# Patient Record
Sex: FEMALE | Race: WHITE | NOT HISPANIC OR LATINO | Employment: UNEMPLOYED | RURAL
[De-identification: names, ages, dates, MRNs, and addresses within clinical notes are randomized per-mention and may not be internally consistent; named-entity substitution may affect disease eponyms.]

---

## 2017-01-31 RX ORDER — HYDROCHLOROTHIAZIDE 25 MG/1
25 TABLET ORAL DAILY
Qty: 30 TABLET | Refills: 0 | Status: SHIPPED | OUTPATIENT
Start: 2017-01-31 | End: 2017-06-20 | Stop reason: SDUPTHER

## 2017-01-31 RX ORDER — CLONIDINE HYDROCHLORIDE 0.1 MG/1
0.1 TABLET ORAL NIGHTLY
Qty: 30 TABLET | Refills: 0 | Status: SHIPPED | OUTPATIENT
Start: 2017-01-31 | End: 2017-03-15 | Stop reason: SDUPTHER

## 2017-03-15 RX ORDER — CLONIDINE HYDROCHLORIDE 0.1 MG/1
0.1 TABLET ORAL NIGHTLY
Qty: 30 TABLET | Refills: 1 | Status: SHIPPED | OUTPATIENT
Start: 2017-03-15 | End: 2017-06-20 | Stop reason: SDUPTHER

## 2017-06-20 RX ORDER — CLONIDINE HYDROCHLORIDE 0.1 MG/1
0.1 TABLET ORAL NIGHTLY
Qty: 30 TABLET | Refills: 0 | Status: SHIPPED | OUTPATIENT
Start: 2017-06-20 | End: 2017-07-12 | Stop reason: SDUPTHER

## 2017-06-20 RX ORDER — HYDROCHLOROTHIAZIDE 25 MG/1
25 TABLET ORAL DAILY
Qty: 30 TABLET | Refills: 0 | Status: SHIPPED | OUTPATIENT
Start: 2017-06-20 | End: 2017-07-12 | Stop reason: SDUPTHER

## 2017-07-12 ENCOUNTER — OFFICE VISIT (OUTPATIENT)
Dept: FAMILY MEDICINE CLINIC | Facility: CLINIC | Age: 52
End: 2017-07-12

## 2017-07-12 VITALS
BODY MASS INDEX: 24.61 KG/M2 | WEIGHT: 133.7 LBS | OXYGEN SATURATION: 97 % | SYSTOLIC BLOOD PRESSURE: 120 MMHG | HEIGHT: 62 IN | RESPIRATION RATE: 20 BRPM | DIASTOLIC BLOOD PRESSURE: 80 MMHG | TEMPERATURE: 98.3 F | HEART RATE: 84 BPM

## 2017-07-12 DIAGNOSIS — Z01.419 ENCOUNTER FOR GYNECOLOGICAL EXAMINATION: ICD-10-CM

## 2017-07-12 DIAGNOSIS — N95.1 MENOPAUSAL SYMPTOMS: ICD-10-CM

## 2017-07-12 DIAGNOSIS — Z13.29 SCREENING FOR THYROID DISORDER: ICD-10-CM

## 2017-07-12 DIAGNOSIS — B18.2 CHRONIC HEPATITIS C WITHOUT HEPATIC COMA (HCC): Primary | Chronic | ICD-10-CM

## 2017-07-12 DIAGNOSIS — Z13.1 SCREENING FOR DIABETES MELLITUS: ICD-10-CM

## 2017-07-12 DIAGNOSIS — F31.60 BIPOLAR 1 DISORDER, MIXED (HCC): Chronic | ICD-10-CM

## 2017-07-12 DIAGNOSIS — Z13.220 SCREENING FOR LIPOID DISORDERS: ICD-10-CM

## 2017-07-12 DIAGNOSIS — F41.9 ANXIETY: Chronic | ICD-10-CM

## 2017-07-12 DIAGNOSIS — R05.3 CHRONIC COUGH: ICD-10-CM

## 2017-07-12 DIAGNOSIS — R63.4 UNINTENTIONAL WEIGHT LOSS: ICD-10-CM

## 2017-07-12 PROCEDURE — 99214 OFFICE O/P EST MOD 30 MIN: CPT | Performed by: NURSE PRACTITIONER

## 2017-07-12 RX ORDER — SERTRALINE HYDROCHLORIDE 100 MG/1
100 TABLET, FILM COATED ORAL 2 TIMES DAILY
OUTPATIENT
Start: 2017-07-12

## 2017-07-12 RX ORDER — RISPERIDONE 2 MG/1
TABLET ORAL
OUTPATIENT
Start: 2017-07-12

## 2017-07-12 RX ORDER — LAMOTRIGINE 100 MG/1
TABLET ORAL
OUTPATIENT
Start: 2017-07-12

## 2017-07-12 RX ORDER — HYDROCHLOROTHIAZIDE 25 MG/1
25 TABLET ORAL DAILY
Qty: 30 TABLET | Refills: 0 | Status: CANCELLED | OUTPATIENT
Start: 2017-07-12

## 2017-07-12 RX ORDER — HYDROCHLOROTHIAZIDE 25 MG/1
25 TABLET ORAL DAILY
Qty: 30 TABLET | Refills: 5 | Status: SHIPPED | OUTPATIENT
Start: 2017-07-12 | End: 2018-04-05 | Stop reason: SDUPTHER

## 2017-07-12 RX ORDER — CLONIDINE HYDROCHLORIDE 0.1 MG/1
0.1 TABLET ORAL NIGHTLY
Qty: 30 TABLET | Refills: 0 | Status: CANCELLED | OUTPATIENT
Start: 2017-07-12

## 2017-07-12 RX ORDER — CLONIDINE HYDROCHLORIDE 0.1 MG/1
0.1 TABLET ORAL NIGHTLY
Qty: 30 TABLET | Refills: 5 | Status: SHIPPED | OUTPATIENT
Start: 2017-07-12 | End: 2018-04-05 | Stop reason: SDUPTHER

## 2017-07-12 RX ORDER — HYDROXYZINE 50 MG/1
TABLET, FILM COATED ORAL
OUTPATIENT
Start: 2017-07-12

## 2017-07-12 NOTE — PATIENT INSTRUCTIONS
Preventive Care 40-64 Years, Female  Preventive care refers to lifestyle choices and visits with your health care provider that can promote health and wellness.  WHAT DOES PREVENTIVE CARE INCLUDE?  · A yearly physical exam. This is also called an annual well check.  · Dental exams once or twice a year.  · Routine eye exams. Ask your health care provider how often you should have your eyes checked.  · Personal lifestyle choices, including:    Daily care of your teeth and gums.    Regular physical activity.    Eating a healthy diet.    Avoiding tobacco and drug use.    Limiting alcohol use.    Practicing safe sex.    Taking low-dose aspirin daily starting at age 50.    Taking vitamin and mineral supplements as recommended by your health care provider.  WHAT HAPPENS DURING AN ANNUAL WELL CHECK?  The services and screenings done by your health care provider during your annual well check will depend on your age, overall health, lifestyle risk factors, and family history of disease.  Counseling  Your health care provider may ask you questions about your:  · Alcohol use.  · Tobacco use.  · Drug use.  · Emotional well-being.  · Home and relationship well-being.  · Sexual activity.  · Eating habits.  · Work and work environment.  · Method of birth control.  · Menstrual cycle.  · Pregnancy history.  Screening  You may have the following tests or measurements:  · Height, weight, and BMI.  · Blood pressure.  · Lipid and cholesterol levels. These may be checked every 5 years, or more frequently if you are over 50 years old.  · Skin check.  · Lung cancer screening. You may have this screening every year starting at age 55 if you have a 30-pack-year history of smoking and currently smoke or have quit within the past 15 years.  · Fecal occult blood test (FOBT) of the stool. You may have this test every year starting at age 50.  · Flexible sigmoidoscopy or colonoscopy. You may have a sigmoidoscopy every 5 years or a colonoscopy  every 10 years starting at age 50.  · Hepatitis C blood test.  · Hepatitis B blood test.  · Sexually transmitted disease (STD) testing.  · Diabetes screening. This is done by checking your blood sugar (glucose) after you have not eaten for a while (fasting). You may have this done every 1-3 years.  · Mammogram. This may be done every 1-2 years. Talk to your health care provider about when you should start having regular mammograms. This may depend on whether you have a family history of breast cancer.  · BRCA-related cancer screening. This may be done if you have a family history of breast, ovarian, tubal, or peritoneal cancers.  · Pelvic exam and Pap test. This may be done every 3 years starting at age 21. Starting at age 30, this may be done every 5 years if you have a Pap test in combination with an HPV test.  · Bone density scan. This is done to screen for osteoporosis. You may have this scan if you are at high risk for osteoporosis.  Discuss your test results, treatment options, and if necessary, the need for more tests with your health care provider.  Vaccines   Your health care provider may recommend certain vaccines, such as:  · Influenza vaccine. This is recommended every year.  · Tetanus, diphtheria, and acellular pertussis (Tdap, Td) vaccine. You may need a Td booster every 10 years.  · Varicella vaccine. You may need this if you have not been vaccinated.  · Zoster vaccine. You may need this after age 60.  · Measles, mumps, and rubella (MMR) vaccine. You may need at least one dose of MMR if you were born in 1957 or later. You may also need a second dose.  · Pneumococcal 13-valent conjugate (PCV13) vaccine. You may need this if you have certain conditions and were not previously vaccinated.  · Pneumococcal polysaccharide (PPSV23) vaccine. You may need one or two doses if you smoke cigarettes or if you have certain conditions.  · Meningococcal vaccine. You may need this if you have certain  "conditions.  · Hepatitis A vaccine. You may need this if you have certain conditions or if you travel or work in places where you may be exposed to hepatitis A.  · Hepatitis B vaccine. You may need this if you have certain conditions or if you travel or work in places where you may be exposed to hepatitis B.  · Haemophilus influenzae type b (Hib) vaccine. You may need this if you have certain conditions.  Talk to your health care provider about which screenings and vaccines you need and how often you need them.     This information is not intended to replace advice given to you by your health care provider. Make sure you discuss any questions you have with your health care provider.     Document Released: 01/13/2017 Document Reviewed: 01/13/2017  [employer] Interactive Patient Education ©2017 [employer]    You Can Quit Smoking  If you are ready to quit smoking or are thinking about it, congratulations! You have chosen to help yourself be healthier and live longer! There are lots of different ways to quit smoking. Nicotine gum, nicotine patches, a nicotine inhaler, or nicotine nasal spray can help with physical craving. Hypnosis, support groups, and medicines help break the habit of smoking.  TIPS TO GET OFF AND STAY OFF CIGARETTES  · Learn to predict your moods. Do not let a bad situation be your excuse to have a cigarette. Some situations in your life might tempt you to have a cigarette.  · Ask friends and co-workers not to smoke around you.  · Make your home smoke-free.  · Never have \"just one\" cigarette. It leads to wanting another and another. Remind yourself of your decision to quit.  · On a card, make a list of your reasons for not smoking. Read it at least the same number of times a day as you have a cigarette. Tell yourself everyday, \"I do not want to smoke. I choose not to smoke.\"  · Ask someone at home or work to help you with your plan to quit smoking.  · Have something planned after you eat or have a " "cup of coffee. Take a walk or get other exercise to perk you up. This will help to keep you from overeating.  · Try a relaxation exercise to calm you down and decrease your stress. Remember, you may be tense and nervous the first two weeks after you quit. This will pass.  · Find new activities to keep your hands busy. Play with a pen, coin, or rubber band. Doodle or draw things on paper.  · Brush your teeth right after eating. This will help cut down the craving for the taste of tobacco after meals. You can try mouthwash too.  · Try gum, breath mints, or diet candy to keep something in your mouth.  IF YOU SMOKE AND WANT TO QUIT:  · Do not stock up on cigarettes. Never buy a carton. Wait until one pack is finished before you buy another.  · Never carry cigarettes with you at work or at home.  · Keep cigarettes as far away from you as possible. Leave them with someone else.  · Never carry matches or a lighter with you.  · Ask yourself, \"Do I need this cigarette or is this just a reflex?\"  · Bet with someone that you can quit. Put cigarette money in a [employer] bank every morning. If you smoke, you give up the money. If you do not smoke, by the end of the week, you keep the money.  · Keep trying. It takes 21 days to change a habit!  · Talk to your doctor about using medicines to help you quit. These include nicotine replacement gum, lozenges, or skin patches.     This information is not intended to replace advice given to you by your health care provider. Make sure you discuss any questions you have with your health care provider.     Document Released: 10/14/2010 Document Revised: 03/11/2013 Document Reviewed: 05/03/2016  [employer] Interactive Patient Education ©2017 [employer]    "

## 2017-07-13 ENCOUNTER — TELEPHONE (OUTPATIENT)
Dept: FAMILY MEDICINE CLINIC | Facility: CLINIC | Age: 52
End: 2017-07-13

## 2017-07-13 NOTE — TELEPHONE ENCOUNTER
----- Message from APOLINAR Mooney sent at 7/12/2017  1:29 PM CDT -----  Regarding: missed appt for limited abdomen ultrasound  She missed the appt for her limited abdomen ultrasound that Abdulaziz Curtis ordered in November. Can we reschedule this for her?

## 2017-07-17 ENCOUNTER — TELEPHONE (OUTPATIENT)
Dept: FAMILY MEDICINE CLINIC | Facility: CLINIC | Age: 52
End: 2017-07-17

## 2017-07-17 NOTE — PROGRESS NOTES
Subjective   Anh Koch is a 52 y.o. female. She presents today for her routine follow up on chronic medical problems.  It has been quite some time since we last saw her in the office.  She reports that she has had a lot of problems with her memory.  She is due for fasting labs.  She is also due for follow up with Abdulaziz Curtis as she missed her last appt with her.  I did refer her to Dr. Whitley, but she believes she missed that appt as well.  She was also scheduled to have a liver US for Abdulaziz, but she never had this completed either.     Anxiety   Presents for follow-up visit. Symptoms include confusion, decreased concentration, depressed mood, dizziness, excessive worry, irritability, malaise and nervous/anxious behavior. Patient reports no chest pain, compulsions, dry mouth, feeling of choking, hyperventilation, impotence, insomnia, muscle tension, nausea, obsessions, palpitations, panic, restlessness, shortness of breath or suicidal ideas. Symptoms occur most days. The severity of symptoms is moderate. The quality of sleep is fair.     Compliance with medications is %.        The following portions of the patient's history were reviewed and updated as appropriate: allergies, current medications, past family history, past medical history, past social history, past surgical history and problem list.    Review of Systems   Constitutional: Positive for irritability.   Respiratory: Negative.  Negative for shortness of breath.    Cardiovascular: Negative.  Negative for chest pain and palpitations.   Gastrointestinal: Negative for nausea.   Genitourinary: Negative for impotence.   Musculoskeletal: Negative for neck pain.   Skin: Negative.    Neurological: Positive for dizziness. Negative for tremors, seizures, syncope, facial asymmetry, speech difficulty, weakness, light-headedness, numbness and headaches.   Psychiatric/Behavioral: Positive for confusion, decreased concentration and dysphoric mood. Negative  for agitation, behavioral problems, hallucinations, self-injury, sleep disturbance and suicidal ideas. The patient is nervous/anxious. The patient does not have insomnia and is not hyperactive.        Objective   Physical Exam   Constitutional: She is oriented to person, place, and time. Vital signs are normal. She appears well-developed and well-nourished. No distress.   HENT:   Head: Normocephalic.   Right Ear: External ear normal.   Left Ear: External ear normal.   Nose: Nose normal.   Mouth/Throat: Oropharynx is clear and moist.   Eyes: EOM are normal. Pupils are equal, round, and reactive to light.   Neck: Normal range of motion. Neck supple. No JVD present. No thyromegaly present.   Cardiovascular: Normal rate and regular rhythm.  Exam reveals no gallop and no friction rub.    No murmur heard.  Pulmonary/Chest: Effort normal and breath sounds normal. No respiratory distress. She has no wheezes. She has no rales.   Musculoskeletal: Normal range of motion.   Neurological: She is alert and oriented to person, place, and time.   Skin: Skin is warm and dry. No rash noted. She is not diaphoretic. No erythema. No pallor.   Psychiatric: She has a normal mood and affect. Her behavior is normal. Judgment and thought content normal.   Nursing note and vitals reviewed.      Assessment/Plan   Anh was seen today for follow-up and med refill.    Diagnoses and all orders for this visit:    Chronic hepatitis C without hepatic coma  -     CBC (No Diff)  -     Comprehensive Metabolic Panel  -     Microalbumin / Creatinine Urine Ratio  -     Urine Drug Screen    Anxiety    Bipolar 1 disorder, mixed    Unintentional weight loss  -     Vitamin D 25 Hydroxy  -     Vitamin B12  -     Iron Profile    Screening for diabetes mellitus  -     Hemoglobin A1c    Screening for lipoid disorders  -     Lipid Panel    Screening for thyroid disorder  -     TSH    Encounter for gynecological examination  -     Ambulatory Referral to  Gynecology    Chronic cough  -     XR Chest PA & Lateral    Menopausal symptoms  -     CloNIDine (CATAPRES) 0.1 MG tablet; Take 1 tablet by mouth Every Night.    Other orders  -     hydrochlorothiazide (HYDRODIURIL) 25 MG tablet; Take 1 tablet by mouth Daily.    Fasting labs.  X-ray following office visit.  Referral to GYN for PAP smear.  Continue current medications.  Follow up in 6 months for routine follow up.  Follow up sooner for problems/concerns.  Patient verbalized understanding and agreement with plan of care.        This document has been electronically signed by APOLINAR Mooney on July 17, 2017 11:09 AM

## 2017-07-17 NOTE — TELEPHONE ENCOUNTER
Left message on pt voicemail that xray was normal and to call the office if she continues to have problems.

## 2017-07-21 LAB
25(OH)D3 SERPL-MCNC: 63.1 NG/ML (ref 30–100)
ALBUMIN SERPL-MCNC: 3.7 G/DL (ref 3.4–4.8)
ALBUMIN UR-MCNC: 4 MG/L
ALBUMIN/GLOB SERPL: 1.6 G/DL (ref 1.1–1.8)
ALP SERPL-CCNC: 112 U/L (ref 38–126)
ALT SERPL W P-5'-P-CCNC: 34 U/L (ref 9–52)
AMPHET+METHAMPHET UR QL: POSITIVE
ANION GAP SERPL CALCULATED.3IONS-SCNC: 8 MMOL/L (ref 5–15)
ARTICHOKE IGE QN: 123 MG/DL (ref 1–129)
AST SERPL-CCNC: 25 U/L (ref 14–36)
BARBITURATES UR QL SCN: NEGATIVE
BENZODIAZ UR QL SCN: NEGATIVE
BILIRUB SERPL-MCNC: 0.4 MG/DL (ref 0.2–1.3)
BUN BLD-MCNC: 11 MG/DL (ref 7–21)
BUN/CREAT SERPL: 19.3 (ref 7–25)
CALCIUM SPEC-SCNC: 9 MG/DL (ref 8.4–10.2)
CANNABINOIDS SERPL QL: POSITIVE
CHLORIDE SERPL-SCNC: 104 MMOL/L (ref 95–110)
CHOLEST SERPL-MCNC: 200 MG/DL (ref 0–199)
CO2 SERPL-SCNC: 27 MMOL/L (ref 22–31)
COCAINE UR QL: NEGATIVE
CREAT BLD-MCNC: 0.57 MG/DL (ref 0.5–1)
CREAT UR-MCNC: 91.8 MG/DL
GFR SERPL CREATININE-BSD FRML MDRD: 112 ML/MIN/1.73 (ref 51–120)
GLOBULIN UR ELPH-MCNC: 2.3 GM/DL (ref 2.3–3.5)
GLUCOSE BLD-MCNC: 89 MG/DL (ref 60–100)
HBA1C MFR BLD: 5.32 % (ref 4–5.6)
HDLC SERPL-MCNC: 61 MG/DL (ref 60–200)
IRON 24H UR-MRATE: 46 MCG/DL (ref 37–170)
IRON SATN MFR SERPL: 16 % (ref 15–50)
LDLC/HDLC SERPL: 1.98 {RATIO} (ref 0–3.22)
METHADONE UR QL SCN: NEGATIVE
MICROALBUMIN/CREAT UR: 43.6 MG/G (ref 0–30)
OPIATES UR QL: NEGATIVE
OXYCODONE UR QL SCN: POSITIVE
POTASSIUM BLD-SCNC: 3.7 MMOL/L (ref 3.5–5.1)
PROT SERPL-MCNC: 6 G/DL (ref 6.3–8.6)
SODIUM BLD-SCNC: 139 MMOL/L (ref 137–145)
TIBC SERPL-MCNC: 289 MCG/DL (ref 265–497)
TRIGL SERPL-MCNC: 91 MG/DL (ref 20–199)
TSH SERPL DL<=0.05 MIU/L-ACNC: 0.52 MIU/ML (ref 0.46–4.68)
VIT B12 BLD-MCNC: >1000 PG/ML (ref 239–931)

## 2017-07-21 PROCEDURE — 83540 ASSAY OF IRON: CPT | Performed by: NURSE PRACTITIONER

## 2017-07-21 PROCEDURE — 82570 ASSAY OF URINE CREATININE: CPT | Performed by: NURSE PRACTITIONER

## 2017-07-21 PROCEDURE — 82306 VITAMIN D 25 HYDROXY: CPT | Performed by: NURSE PRACTITIONER

## 2017-07-21 PROCEDURE — 80307 DRUG TEST PRSMV CHEM ANLYZR: CPT | Performed by: NURSE PRACTITIONER

## 2017-07-21 PROCEDURE — 83550 IRON BINDING TEST: CPT | Performed by: NURSE PRACTITIONER

## 2017-07-21 PROCEDURE — 82043 UR ALBUMIN QUANTITATIVE: CPT | Performed by: NURSE PRACTITIONER

## 2017-07-21 PROCEDURE — 82607 VITAMIN B-12: CPT | Performed by: NURSE PRACTITIONER

## 2017-07-21 PROCEDURE — 85025 COMPLETE CBC W/AUTO DIFF WBC: CPT | Performed by: PHYSICIAN ASSISTANT

## 2017-07-21 PROCEDURE — 80053 COMPREHEN METABOLIC PANEL: CPT | Performed by: NURSE PRACTITIONER

## 2017-07-21 PROCEDURE — 36415 COLL VENOUS BLD VENIPUNCTURE: CPT | Performed by: NURSE PRACTITIONER

## 2017-07-21 PROCEDURE — 84443 ASSAY THYROID STIM HORMONE: CPT | Performed by: NURSE PRACTITIONER

## 2017-07-21 PROCEDURE — 83036 HEMOGLOBIN GLYCOSYLATED A1C: CPT | Performed by: NURSE PRACTITIONER

## 2017-07-21 PROCEDURE — 80061 LIPID PANEL: CPT | Performed by: NURSE PRACTITIONER

## 2017-07-24 DIAGNOSIS — R82.5 POSITIVE URINE DRUG SCREEN: Primary | ICD-10-CM

## 2017-07-26 ENCOUNTER — TELEPHONE (OUTPATIENT)
Dept: FAMILY MEDICINE CLINIC | Facility: CLINIC | Age: 52
End: 2017-07-26

## 2017-09-11 ENCOUNTER — OFFICE VISIT (OUTPATIENT)
Dept: OBSTETRICS AND GYNECOLOGY | Facility: CLINIC | Age: 52
End: 2017-09-11

## 2017-09-11 VITALS
DIASTOLIC BLOOD PRESSURE: 80 MMHG | BODY MASS INDEX: 24.66 KG/M2 | SYSTOLIC BLOOD PRESSURE: 121 MMHG | HEIGHT: 62 IN | WEIGHT: 134 LBS

## 2017-09-11 DIAGNOSIS — F33.41 RECURRENT MAJOR DEPRESSIVE DISORDER, IN PARTIAL REMISSION (HCC): Chronic | ICD-10-CM

## 2017-09-11 DIAGNOSIS — F17.200 SMOKER: Chronic | ICD-10-CM

## 2017-09-11 DIAGNOSIS — N95.1 MENOPAUSAL AND FEMALE CLIMACTERIC STATES: Chronic | ICD-10-CM

## 2017-09-11 DIAGNOSIS — Z12.31 ENCOUNTER FOR SCREENING MAMMOGRAM FOR BREAST CANCER: ICD-10-CM

## 2017-09-11 DIAGNOSIS — Z01.419 ENCOUNTER FOR ANNUAL ROUTINE GYNECOLOGICAL EXAMINATION: Primary | ICD-10-CM

## 2017-09-11 PROCEDURE — 99386 PREV VISIT NEW AGE 40-64: CPT | Performed by: OBSTETRICS & GYNECOLOGY

## 2017-09-11 PROCEDURE — 87624 HPV HI-RISK TYP POOLED RSLT: CPT | Performed by: OBSTETRICS & GYNECOLOGY

## 2017-09-11 PROCEDURE — 88142 CYTOPATH C/V THIN LAYER: CPT | Performed by: OBSTETRICS & GYNECOLOGY

## 2017-09-11 RX ORDER — BUPROPION HYDROCHLORIDE 150 MG/1
150 TABLET, EXTENDED RELEASE ORAL 2 TIMES DAILY
Qty: 60 TABLET | Refills: 11 | Status: SHIPPED | OUTPATIENT
Start: 2017-09-11 | End: 2018-04-05

## 2017-09-11 NOTE — PROGRESS NOTES
Anh Koch is a 52 y.o. y/o female     Chief Complaint: Establish care, annual GYN exam and Pap smear, smoker, menopausal syndrome, depression    HPI: 52-year-old  with two miscarriages and two vaginal deliveries.  She has had a hysterectomy with BSO.  It's been at least 3 years since she's had a Pap smear or mammogram.  She smokes half pack cigarettes per day.  She has quit in the past, but then stress comes about and she starts again.  She takes Zoloft 100 mg daily.    She is interested in quitting smoking.  Also interested in trying Wellbutrin.    Review of Systems   Constitutional: Positive for fatigue. Negative for activity change, appetite change, chills, diaphoresis, fever and unexpected weight change.   Gastrointestinal: Negative for abdominal pain, constipation, diarrhea and nausea.   Genitourinary: Negative for difficulty urinating, dyspareunia, dysuria, pelvic pain, urgency, vaginal bleeding, vaginal discharge and vaginal pain.   Neurological: Negative for headaches.   Psychiatric/Behavioral: Positive for dysphoric mood. The patient is not nervous/anxious.    All other systems reviewed and are negative.     Breast ROS: negative    The following portions of the patient's history were reviewed and updated as appropriate: allergies, current medications, past family history, past medical history, past social history, past surgical history and problem list.    No Known Allergies       Current Outpatient Prescriptions:   •  CloNIDine (CATAPRES) 0.1 MG tablet, Take 1 tablet by mouth Every Night., Disp: 30 tablet, Rfl: 5  •  hydrochlorothiazide (HYDRODIURIL) 25 MG tablet, Take 1 tablet by mouth Daily., Disp: 30 tablet, Rfl: 5  •  hydrOXYzine (ATARAX) 50 MG tablet, , Disp: , Rfl:   •  lamoTRIgine (LaMICtal) 100 MG tablet, , Disp: , Rfl:   •  risperiDONE (RisperDAL) 2 MG tablet, , Disp: , Rfl:   •  sertraline (ZOLOFT) 100 MG tablet, 100 mg 2 (Two) Times a Day., Disp: , Rfl:   •  buPROPion SR (WELLBUTRIN  SR) 150 MG 12 hr tablet, Take 1 tablet by mouth 2 (Two) Times a Day., Disp: 60 tablet, Rfl: 11     The patient has a family history of   Family History   Problem Relation Age of Onset   • Hypertension Mother    • Diabetes Mother    • Heart attack Father    • Sudden death Father    • Heart disease Father         Past Medical History:   Diagnosis Date   • Abnormal liver function    • Anxiety    • Basal cell carcinoma    • Bipolar disorder    • Chronic hepatitis C     1a. HCV Fibrosure .04/F0, necroinflam .12/A0   • Elevated levels of transaminase & lactic acid dehydrogenase    • Hallux valgus with bunions    • Hepatitis C    • Kidney stone    • Low back pain    • Menopausal and female climacteric states 2017   • Recurrent major depressive disorder, in partial remission 2017   • Smoker 2017        OB History      Para Term  AB TAB SAB Ectopic Multiple Living    5 2                   Social History     Social History   • Marital status: Single     Spouse name: N/A   • Number of children: N/A   • Years of education: N/A     Occupational History   • Not on file.     Social History Main Topics   • Smoking status: Current Every Day Smoker     Packs/day: 1.50     Years: 40.00     Types: Cigarettes     Start date:    • Smokeless tobacco: Never Used   • Alcohol use 1.8 oz/week     1 Glasses of wine, 1 Cans of beer, 1 Shots of liquor per week      Comment: Ocassionally   • Drug use: No   • Sexual activity: Not Currently     Other Topics Concern   • Not on file     Social History Narrative        Past Surgical History:   Procedure Laterality Date   • BUNIONECTOMY Right    • COLONOSCOPY  2016    Normal colon.No specimens.   • CYSTOSCOPY W/ URETEROSCOPY W/ LITHOTRIPSY     • PARTIAL HYSTERECTOMY     • TUBAL ABDOMINAL LIGATION          Patient Active Problem List   Diagnosis   • Chronic hepatitis C   • Hallux valgus with bunions   • Anxiety   • Bipolar 1 disorder, mixed   • Unintentional weight  "loss   • Menopausal and female climacteric states   • Smoker   • Recurrent major depressive disorder, in partial remission        Documented Vitals    09/11/17 1051   BP: 121/80   Weight: 134 lb (60.8 kg)   Height: 62\" (157.5 cm)   PainSc: 0-No pain       Physical Exam   Constitutional: She is oriented to person, place, and time. She appears well-developed and well-nourished. No distress.   134 pounds with BMI 24.5.   HENT:   Head: Normocephalic and atraumatic.   Eyes: Conjunctivae and EOM are normal. Pupils are equal, round, and reactive to light.   Neck: Normal range of motion. Neck supple. No JVD present. No tracheal deviation present. No thyromegaly present.   Cardiovascular: Normal rate, regular rhythm, normal heart sounds and intact distal pulses.  Exam reveals no gallop and no friction rub.    No murmur heard.  Pulmonary/Chest: Effort normal and breath sounds normal. No stridor. No respiratory distress. She has no wheezes. She has no rales. She exhibits no tenderness. Right breast exhibits no inverted nipple, no mass, no nipple discharge, no skin change and no tenderness. Left breast exhibits no inverted nipple, no mass, no nipple discharge, no skin change and no tenderness. Breasts are symmetrical. There is no breast swelling.   Abdominal: Soft. Bowel sounds are normal. She exhibits no distension and no mass. There is no tenderness. There is no rebound and no guarding. No hernia. Hernia confirmed negative in the right inguinal area and confirmed negative in the left inguinal area.   Genitourinary: Rectal exam shows no external hemorrhoid, no internal hemorrhoid, no fissure, no mass, no tenderness, anal tone normal and guaiac negative stool. No breast tenderness, discharge or bleeding. No labial fusion. There is no rash, tenderness, lesion or injury on the right labia. There is no rash, tenderness, lesion or injury on the left labia. No erythema, tenderness or bleeding in the vagina. No foreign body in the " vagina. No signs of injury around the vagina. No vaginal discharge found.   Genitourinary Comments: Pap smear of the vaginal cuff performed.  Cervix surgically absent.  Uterus surgically absent.  Adnexa surgically absent.  No pelvic masses palpated.  Urethral meatus without erythema or prolapse.   Musculoskeletal: Normal range of motion. She exhibits no edema, tenderness or deformity.   Lymphadenopathy:     She has no cervical adenopathy.        Right: No inguinal adenopathy present.        Left: No inguinal adenopathy present.   Neurological: She is alert and oriented to person, place, and time. She has normal reflexes. She displays normal reflexes. No cranial nerve deficit. She exhibits normal muscle tone. Coordination normal.   Skin: Skin is warm and dry. No rash noted. She is not diaphoretic. No erythema. No pallor.   Psychiatric: She has a normal mood and affect. Her behavior is normal. Judgment and thought content normal.   Nursing note and vitals reviewed.       Assessment        Diagnosis Plan   1. Encounter for annual routine gynecological examination  Liquid-based Pap Smear, Screening   2. Encounter for screening mammogram for breast cancer  Mammo Screening Digital Tomosynthesis Bilateral With CAD   3. Menopausal and female climacteric states     4. Smoker     5. Recurrent major depressive disorder, in partial remission           Plan     1. Wellbutrin  mg by mouth twice a day.  2. Counseled quit smoking.  3. Recommended vitamin D3 and liquid B12 supplementation.  4. Schedule mammogram.    5. Encouraged in diet and exercise.  6. Handouts on depression, hot flashes, exercise, and vitamin use.   7. Follow-up in 6 weeks.  Follow-up sooner as needed.            This document has been electronically signed by Bharat Sagastume MD on September 11, 2017 11:39 AM

## 2017-09-12 ENCOUNTER — OFFICE VISIT (OUTPATIENT)
Dept: GASTROENTEROLOGY | Facility: CLINIC | Age: 52
End: 2017-09-12

## 2017-09-12 VITALS
SYSTOLIC BLOOD PRESSURE: 139 MMHG | BODY MASS INDEX: 24.48 KG/M2 | HEART RATE: 79 BPM | HEIGHT: 62 IN | WEIGHT: 133 LBS | DIASTOLIC BLOOD PRESSURE: 88 MMHG

## 2017-09-12 DIAGNOSIS — R74.8 ELEVATED LIVER ENZYMES: ICD-10-CM

## 2017-09-12 DIAGNOSIS — B18.2 CHRONIC HEPATITIS C WITHOUT HEPATIC COMA (HCC): Primary | ICD-10-CM

## 2017-09-12 PROCEDURE — 99214 OFFICE O/P EST MOD 30 MIN: CPT | Performed by: PHYSICIAN ASSISTANT

## 2017-09-14 LAB
LAB AP CASE REPORT: NORMAL
LAB AP GYN ADDITIONAL INFORMATION: NORMAL
Lab: NORMAL
PATH INTERP SPEC-IMP: NORMAL
STAT OF ADQ CVX/VAG CYTO-IMP: NORMAL

## 2017-09-19 LAB — HPV I/H RISK 4 DNA CVX QL PROBE+SIG AMP: NEGATIVE

## 2017-10-20 LAB
ALBUMIN SERPL-MCNC: 3.7 G/DL (ref 3.4–4.8)
ALP SERPL-CCNC: 113 U/L (ref 38–126)
ALT SERPL W P-5'-P-CCNC: 35 U/L (ref 9–52)
AMPHET+METHAMPHET UR QL: NEGATIVE
AST SERPL-CCNC: 22 U/L (ref 14–36)
BARBITURATES UR QL SCN: NEGATIVE
BENZODIAZ UR QL SCN: NEGATIVE
BILIRUB CONJ SERPL-MCNC: 0 MG/DL (ref 0–0.3)
BILIRUB INDIRECT SERPL-MCNC: 0.1 MG/DL (ref 0–1.1)
BILIRUB SERPL-MCNC: 0.3 MG/DL (ref 0.2–1.3)
CANNABINOIDS SERPL QL: POSITIVE
COCAINE UR QL: NEGATIVE
INR PPP: 0.81 (ref 0.8–1.2)
METHADONE UR QL SCN: NEGATIVE
OPIATES UR QL: NEGATIVE
OXYCODONE UR QL SCN: POSITIVE
PROT SERPL-MCNC: 6.3 G/DL (ref 6.3–8.6)
PROTHROMBIN TIME: 11.1 SECONDS (ref 11.1–15.3)

## 2017-10-20 PROCEDURE — 80307 DRUG TEST PRSMV CHEM ANLYZR: CPT | Performed by: PHYSICIAN ASSISTANT

## 2017-10-20 PROCEDURE — 80076 HEPATIC FUNCTION PANEL: CPT | Performed by: PHYSICIAN ASSISTANT

## 2017-10-20 PROCEDURE — 82247 BILIRUBIN TOTAL: CPT | Performed by: PHYSICIAN ASSISTANT

## 2017-10-20 PROCEDURE — 85610 PROTHROMBIN TIME: CPT | Performed by: PHYSICIAN ASSISTANT

## 2017-10-20 PROCEDURE — 84460 ALANINE AMINO (ALT) (SGPT): CPT | Performed by: PHYSICIAN ASSISTANT

## 2017-10-20 PROCEDURE — 83010 ASSAY OF HAPTOGLOBIN QUANT: CPT | Performed by: PHYSICIAN ASSISTANT

## 2017-10-20 PROCEDURE — 87522 HEPATITIS C REVRS TRNSCRPJ: CPT | Performed by: PHYSICIAN ASSISTANT

## 2017-10-20 PROCEDURE — 82977 ASSAY OF GGT: CPT | Performed by: PHYSICIAN ASSISTANT

## 2017-10-20 PROCEDURE — 36415 COLL VENOUS BLD VENIPUNCTURE: CPT | Performed by: FAMILY MEDICINE

## 2017-10-20 PROCEDURE — 82105 ALPHA-FETOPROTEIN SERUM: CPT | Performed by: PHYSICIAN ASSISTANT

## 2017-10-20 PROCEDURE — 83883 ASSAY NEPHELOMETRY NOT SPEC: CPT | Performed by: PHYSICIAN ASSISTANT

## 2017-10-22 LAB — AFP-TM SERPL-MCNC: 1.8 NG/ML (ref 0–8.3)

## 2017-10-23 ENCOUNTER — OFFICE VISIT (OUTPATIENT)
Dept: OBSTETRICS AND GYNECOLOGY | Facility: CLINIC | Age: 52
End: 2017-10-23

## 2017-10-23 VITALS
WEIGHT: 135.2 LBS | DIASTOLIC BLOOD PRESSURE: 76 MMHG | BODY MASS INDEX: 24.88 KG/M2 | SYSTOLIC BLOOD PRESSURE: 100 MMHG | HEIGHT: 62 IN

## 2017-10-23 DIAGNOSIS — N95.1 MENOPAUSAL SYNDROME: Chronic | ICD-10-CM

## 2017-10-23 DIAGNOSIS — F17.200 SMOKER: Chronic | ICD-10-CM

## 2017-10-23 DIAGNOSIS — F33.41 RECURRENT MAJOR DEPRESSIVE DISORDER, IN PARTIAL REMISSION (HCC): Chronic | ICD-10-CM

## 2017-10-23 DIAGNOSIS — F90.2 ATTENTION DEFICIT HYPERACTIVITY DISORDER (ADHD), COMBINED TYPE: Primary | Chronic | ICD-10-CM

## 2017-10-23 LAB
HCV RNA SERPL NAA+PROBE-ACNC: NORMAL IU/ML
HCV RNA SERPL NAA+PROBE-LOG IU: 6.25 LOG10 IU/ML
TEST INFORMATION: NORMAL

## 2017-10-23 PROCEDURE — 99214 OFFICE O/P EST MOD 30 MIN: CPT | Performed by: OBSTETRICS & GYNECOLOGY

## 2017-10-23 RX ORDER — DEXTROAMPHETAMINE SACCHARATE, AMPHETAMINE ASPARTATE, DEXTROAMPHETAMINE SULFATE AND AMPHETAMINE SULFATE 7.5; 7.5; 7.5; 7.5 MG/1; MG/1; MG/1; MG/1
30 TABLET ORAL 2 TIMES DAILY
Qty: 60 TABLET | Refills: 0 | Status: SHIPPED | OUTPATIENT
Start: 2017-10-23 | End: 2017-12-04 | Stop reason: SDUPTHER

## 2017-10-23 NOTE — PROGRESS NOTES
Anh Koch is a 52 y.o. y/o female     Chief Complaint: Depression, ADHD, menopausal, smoker    HPI: 52-year-old  with two miscarriages and two vaginal deliveries.  She has had a hysterectomy with BSO.  She smokes half pack cigarettes per day.  She has quit in the past, but then stress comes about and she starts again.  She takes Zoloft 100 mg daily.     She is interested in quitting smoking.  She started Wellbutrin  mg twice daily, and she has cut back a little longer smoking.    She has been on Adderall 30 mg every morning, I will request to get back on that.    Review of Systems   Constitutional: Positive for fatigue. Negative for activity change, appetite change, chills, diaphoresis, fever and unexpected weight change.   Gastrointestinal: Negative for abdominal pain, constipation, diarrhea and nausea.   Genitourinary: Negative for difficulty urinating, dyspareunia, dysuria, pelvic pain, urgency, vaginal bleeding, vaginal discharge and vaginal pain.   Neurological: Negative for headaches.   Psychiatric/Behavioral: Positive for dysphoric mood. The patient is nervous/anxious.    All other systems reviewed and are negative.     Breast ROS: negative    The following portions of the patient's history were reviewed and updated as appropriate: allergies, current medications, past family history, past medical history, past social history, past surgical history and problem list.    No Known Allergies       Current Outpatient Prescriptions:   •  buPROPion SR (WELLBUTRIN SR) 150 MG 12 hr tablet, Take 1 tablet by mouth 2 (Two) Times a Day., Disp: 60 tablet, Rfl: 11  •  CloNIDine (CATAPRES) 0.1 MG tablet, Take 1 tablet by mouth Every Night., Disp: 30 tablet, Rfl: 5  •  hydrochlorothiazide (HYDRODIURIL) 25 MG tablet, Take 1 tablet by mouth Daily., Disp: 30 tablet, Rfl: 5  •  lamoTRIgine (LaMICtal) 100 MG tablet, Take 100 mg by mouth Daily., Disp: , Rfl:   •  risperiDONE (RisperDAL) 2 MG tablet, Take 2 mg by  mouth Daily., Disp: , Rfl:   •  sertraline (ZOLOFT) 100 MG tablet, 100 mg 2 (Two) Times a Day., Disp: , Rfl:   •  amphetamine-dextroamphetamine (ADDERALL) 30 MG tablet, Take 1 tablet by mouth 2 (Two) Times a Day., Disp: 60 tablet, Rfl: 0     The patient has a family history of   Family History   Problem Relation Age of Onset   • Hypertension Mother    • Diabetes Mother    • Heart attack Father    • Sudden death Father    • Heart disease Father         Past Medical History:   Diagnosis Date   • Abnormal liver function    • Anxiety    • Attention deficit hyperactivity disorder (ADHD), combined type 10/23/2017   • Basal cell carcinoma    • Bipolar disorder    • Chronic hepatitis C     1a. HCV Fibrosure ., necroinflam .A0   • Elevated levels of transaminase & lactic acid dehydrogenase    • Hallux valgus with bunions    • Hepatitis C    • Kidney stone    • Low back pain    • Menopausal and female climacteric states 2017   • Recurrent major depressive disorder, in partial remission 2017   • Smoker 2017        OB History      Para Term  AB Living    5 2        SAB TAB Ectopic Multiple Live Births                   Social History     Social History   • Marital status: Single     Spouse name: N/A   • Number of children: N/A   • Years of education: N/A     Occupational History   • Not on file.     Social History Main Topics   • Smoking status: Current Every Day Smoker     Packs/day: 1.50     Years: 40.00     Types: Cigarettes     Start date:    • Smokeless tobacco: Never Used   • Alcohol use 1.8 oz/week     1 Glasses of wine, 1 Cans of beer, 1 Shots of liquor per week      Comment: Ocassionally   • Drug use: No   • Sexual activity: Not Currently     Other Topics Concern   • Not on file     Social History Narrative        Past Surgical History:   Procedure Laterality Date   • BUNIONECTOMY Right    • COLONOSCOPY  2016    Normal colon.No specimens.   • CYSTOSCOPY W/ URETEROSCOPY W/  "LITHOTRIPSY     • PARTIAL HYSTERECTOMY     • TUBAL ABDOMINAL LIGATION          Patient Active Problem List   Diagnosis   • Chronic hepatitis C   • Hallux valgus with bunions   • Anxiety   • Bipolar 1 disorder, mixed   • Unintentional weight loss   • Menopausal and female climacteric states   • Smoker   • Recurrent major depressive disorder, in partial remission   • Attention deficit hyperactivity disorder (ADHD), combined type        Documented Vitals    10/23/17 1133   BP: 100/76   Weight: 135 lb 3.2 oz (61.3 kg)   Height: 62\" (157.5 cm)   PainSc: 0-No pain       Physical Exam   Constitutional: She is oriented to person, place, and time. She appears well-developed and well-nourished. No distress.   135.2 pounds with BMI 24.7.  Pulse 76.   HENT:   Head: Normocephalic and atraumatic.   Eyes: Conjunctivae and EOM are normal. Pupils are equal, round, and reactive to light.   Neck: Normal range of motion. Neck supple. No JVD present. No tracheal deviation present. No thyromegaly present.   Cardiovascular: Normal rate, regular rhythm, normal heart sounds and intact distal pulses.  Exam reveals no gallop and no friction rub.    No murmur heard.  Pulmonary/Chest: Effort normal and breath sounds normal. No stridor. No respiratory distress. She has no wheezes. She has no rales. She exhibits no tenderness.   Abdominal: Soft. Bowel sounds are normal. She exhibits no distension and no mass. There is no tenderness. There is no rebound and no guarding. No hernia.   Musculoskeletal: Normal range of motion. She exhibits no edema, tenderness or deformity.   Lymphadenopathy:     She has no cervical adenopathy.   Neurological: She is alert and oriented to person, place, and time. She has normal reflexes. She displays normal reflexes. No cranial nerve deficit. She exhibits normal muscle tone. Coordination normal.   Skin: Skin is warm and dry. No rash noted. She is not diaphoretic. No erythema. No pallor.   Psychiatric: She has a normal " mood and affect. Her behavior is normal. Judgment and thought content normal.   Nursing note and vitals reviewed.       Assessment        Diagnosis Plan   1. Attention deficit hyperactivity disorder (ADHD), combined type     2. Recurrent major depressive disorder, in partial remission     3. Menopausal and female climacteric states     4. Smoker           Plan      1. Adderall 30 mg every morning.    2. Encouraged to quit smoking.    3. Encouraged in healthy diet and exercise.  4. Follow-up in 6 weeks..  Follow-up sooner as needed.            This document has been electronically signed by Bharat Sagastume MD on October 23, 2017 12:10 PM

## 2017-10-24 ENCOUNTER — OFFICE VISIT (OUTPATIENT)
Dept: GASTROENTEROLOGY | Facility: CLINIC | Age: 52
End: 2017-10-24

## 2017-10-24 VITALS
HEIGHT: 62 IN | WEIGHT: 135 LBS | DIASTOLIC BLOOD PRESSURE: 74 MMHG | HEART RATE: 67 BPM | SYSTOLIC BLOOD PRESSURE: 108 MMHG | BODY MASS INDEX: 24.84 KG/M2

## 2017-10-24 DIAGNOSIS — B18.2 CHRONIC HEPATITIS C WITHOUT HEPATIC COMA (HCC): Primary | ICD-10-CM

## 2017-10-24 DIAGNOSIS — R74.8 ELEVATED LIVER ENZYMES: ICD-10-CM

## 2017-10-24 LAB
A2 MACROGLOB SERPL-MCNC: 190 MG/DL (ref 110–276)
ALT SERPL W P-5'-P-CCNC: 26 IU/L (ref 0–40)
APO A-I SERPL-MCNC: 177 MG/DL (ref 116–209)
BILIRUB SERPL-MCNC: 0.2 MG/DL (ref 0–1.2)
FIBROSIS SCORING:: NORMAL
FIBROSIS STAGE SERPL QL: NORMAL
GGT SERPL-CCNC: 22 IU/L (ref 0–60)
HAPTOGLOB SERPL-MCNC: 101 MG/DL (ref 34–200)
HCV AB SER QL: NORMAL
LABORATORY COMMENT REPORT: NORMAL
LIMITATIONS:: NORMAL
LIVER FIBR SCORE SERPL CALC.FIBROSURE: 0.07 (ref 0–0.21)
NECROINFLAMM ACTIVITY SCORING:: NORMAL
NECROINFLAMMATORY ACT GRADE SERPL QL: NORMAL
NECROINFLAMMATORY ACT SCORE SERPL: 0.09 (ref 0–0.17)

## 2017-10-24 PROCEDURE — 99213 OFFICE O/P EST LOW 20 MIN: CPT | Performed by: PHYSICIAN ASSISTANT

## 2017-12-04 ENCOUNTER — OFFICE VISIT (OUTPATIENT)
Dept: OBSTETRICS AND GYNECOLOGY | Facility: CLINIC | Age: 52
End: 2017-12-04

## 2017-12-04 VITALS
DIASTOLIC BLOOD PRESSURE: 72 MMHG | WEIGHT: 136 LBS | BODY MASS INDEX: 25.03 KG/M2 | HEIGHT: 62 IN | SYSTOLIC BLOOD PRESSURE: 124 MMHG

## 2017-12-04 DIAGNOSIS — F17.200 SMOKER: Chronic | ICD-10-CM

## 2017-12-04 DIAGNOSIS — F33.41 RECURRENT MAJOR DEPRESSIVE DISORDER, IN PARTIAL REMISSION (HCC): Chronic | ICD-10-CM

## 2017-12-04 DIAGNOSIS — N95.1 MENOPAUSAL SYNDROME: Chronic | ICD-10-CM

## 2017-12-04 DIAGNOSIS — F90.2 ATTENTION DEFICIT HYPERACTIVITY DISORDER (ADHD), COMBINED TYPE: Primary | Chronic | ICD-10-CM

## 2017-12-04 PROCEDURE — 99213 OFFICE O/P EST LOW 20 MIN: CPT | Performed by: OBSTETRICS & GYNECOLOGY

## 2017-12-04 RX ORDER — DEXTROAMPHETAMINE SACCHARATE, AMPHETAMINE ASPARTATE, DEXTROAMPHETAMINE SULFATE AND AMPHETAMINE SULFATE 7.5; 7.5; 7.5; 7.5 MG/1; MG/1; MG/1; MG/1
30 TABLET ORAL 2 TIMES DAILY
Qty: 60 TABLET | Refills: 0 | Status: SHIPPED | OUTPATIENT
Start: 2017-12-04 | End: 2018-01-08 | Stop reason: SDUPTHER

## 2017-12-04 NOTE — PROGRESS NOTES
Anh Koch is a 52 y.o. y/o female     Chief Complaint: Depression, ADHD, menopausal, smoker    HPI: 52-year-old  with two miscarriages and two vaginal deliveries.  She has had a hysterectomy with BSO.      She smokes a half pack cigarettes per day, but has been able to cut back somewhat.  She has quit in the past, but then stress comes about and she starts again.      She takes Zoloft 100 mg daily.      She is interested in quitting smoking.  She started Wellbutrin  mg twice daily, and she has cut back a little on her smoking.     She has been on Adderall 30 mg every morning, and she is not having any problems with that.    Review of Systems   Breast ROS: negative    The following portions of the patient's history were reviewed and updated as appropriate: allergies, current medications, past family history, past medical history, past social history, past surgical history and problem list.    No Known Allergies       Current Outpatient Prescriptions:   •  amphetamine-dextroamphetamine (ADDERALL) 30 MG tablet, Take 1 tablet by mouth 2 (Two) Times a Day., Disp: 60 tablet, Rfl: 0  •  buPROPion SR (WELLBUTRIN SR) 150 MG 12 hr tablet, Take 1 tablet by mouth 2 (Two) Times a Day., Disp: 60 tablet, Rfl: 11  •  CloNIDine (CATAPRES) 0.1 MG tablet, Take 1 tablet by mouth Every Night., Disp: 30 tablet, Rfl: 5  •  hydrochlorothiazide (HYDRODIURIL) 25 MG tablet, Take 1 tablet by mouth Daily., Disp: 30 tablet, Rfl: 5  •  lamoTRIgine (LaMICtal) 100 MG tablet, Take 100 mg by mouth Daily., Disp: , Rfl:   •  risperiDONE (RisperDAL) 2 MG tablet, Take 2 mg by mouth Daily., Disp: , Rfl:   •  sertraline (ZOLOFT) 100 MG tablet, 100 mg 2 (Two) Times a Day., Disp: , Rfl:      The patient has a family history of   Family History   Problem Relation Age of Onset   • Hypertension Mother    • Diabetes Mother    • Heart attack Father    • Sudden death Father    • Heart disease Father         Past Medical History:   Diagnosis Date    • Abnormal liver function    • Anxiety    • Attention deficit hyperactivity disorder (ADHD), combined type 10/23/2017   • Basal cell carcinoma    • Bipolar disorder    • Chronic hepatitis C     1a. HCV Fibrosure .04/, necroinflam .12/A0   • Elevated levels of transaminase & lactic acid dehydrogenase    • Hallux valgus with bunions    • Hepatitis C    • Kidney stone    • Low back pain    • Menopausal and female climacteric states 2017   • Recurrent major depressive disorder, in partial remission 2017   • Smoker 2017        OB History      Para Term  AB Living    5 2        SAB TAB Ectopic Multiple Live Births                   Social History     Social History   • Marital status: Single     Spouse name: N/A   • Number of children: N/A   • Years of education: N/A     Occupational History   • Not on file.     Social History Main Topics   • Smoking status: Current Every Day Smoker     Packs/day: 1.50     Years: 40.00     Types: Cigarettes     Start date:    • Smokeless tobacco: Never Used   • Alcohol use 1.8 oz/week     1 Glasses of wine, 1 Cans of beer, 1 Shots of liquor per week      Comment: Ocassionally   • Drug use: No   • Sexual activity: Not Currently     Other Topics Concern   • Not on file     Social History Narrative        Past Surgical History:   Procedure Laterality Date   • BUNIONECTOMY Right    • COLONOSCOPY  2016    Normal colon.No specimens.   • CYSTOSCOPY W/ URETEROSCOPY W/ LITHOTRIPSY     • PARTIAL HYSTERECTOMY     • TUBAL ABDOMINAL LIGATION          Patient Active Problem List   Diagnosis   • Chronic hepatitis C   • Hallux valgus with bunions   • Anxiety   • Bipolar 1 disorder, mixed   • Unintentional weight loss   • Menopausal and female climacteric states   • Smoker   • Recurrent major depressive disorder, in partial remission   • Attention deficit hyperactivity disorder (ADHD), combined type        Documented Vitals    17 1047   BP: 124/72   Weight:  "136 lb (61.7 kg)   Height: 62\" (157.5 cm)   PainSc: 0-No pain       Physical Exam   Constitutional: She is oriented to person, place, and time. She appears well-developed and well-nourished. No distress.   Weight 136 pounds with BMI 24.9.   HENT:   Head: Normocephalic and atraumatic.   Eyes: Conjunctivae and EOM are normal. Pupils are equal, round, and reactive to light.   Neck: Normal range of motion. Neck supple. No JVD present. No tracheal deviation present. No thyromegaly present.   Cardiovascular: Normal rate, regular rhythm, normal heart sounds and intact distal pulses.  Exam reveals no gallop and no friction rub.    No murmur heard.  Pulmonary/Chest: Effort normal and breath sounds normal. No stridor. No respiratory distress. She has no wheezes. She has no rales. She exhibits no tenderness.   Abdominal: Soft. Bowel sounds are normal. She exhibits no distension and no mass. There is no tenderness. There is no rebound and no guarding. No hernia.   Musculoskeletal: Normal range of motion. She exhibits no edema, tenderness or deformity.   Lymphadenopathy:     She has no cervical adenopathy.   Neurological: She is alert and oriented to person, place, and time. She has normal reflexes. She displays normal reflexes. No cranial nerve deficit. She exhibits normal muscle tone. Coordination normal.   Skin: Skin is warm and dry. No rash noted. She is not diaphoretic. No erythema. No pallor.   Psychiatric: She has a normal mood and affect. Her behavior is normal. Judgment and thought content normal.   Nursing note and vitals reviewed.      Assessment        Diagnosis Plan   1. Attention deficit hyperactivity disorder (ADHD), combined type     2. Recurrent major depressive disorder, in partial remission     3. Menopausal and female climacteric states     4. Smoker           Plan     1. Refilled Adderall 30 mg every morning.   2. Continued to counseled to quit smoking.   3. Encouraged in diet and exercise.   4. Follow-up in " 6 week.  Follow-up sooner as needed.            This document has been electronically signed by Bharat Sagastume MD on December 4, 2017 11:32 AM

## 2017-12-27 ENCOUNTER — OFFICE VISIT (OUTPATIENT)
Dept: FAMILY MEDICINE CLINIC | Facility: CLINIC | Age: 52
End: 2017-12-27

## 2017-12-27 VITALS
DIASTOLIC BLOOD PRESSURE: 60 MMHG | TEMPERATURE: 98.2 F | WEIGHT: 143.5 LBS | BODY MASS INDEX: 26.41 KG/M2 | SYSTOLIC BLOOD PRESSURE: 102 MMHG | HEART RATE: 82 BPM | HEIGHT: 62 IN | OXYGEN SATURATION: 98 % | RESPIRATION RATE: 20 BRPM

## 2017-12-27 DIAGNOSIS — J01.00 ACUTE NON-RECURRENT MAXILLARY SINUSITIS: Primary | ICD-10-CM

## 2017-12-27 DIAGNOSIS — K92.1 BLACK TARRY STOOLS: ICD-10-CM

## 2017-12-27 PROCEDURE — 36415 COLL VENOUS BLD VENIPUNCTURE: CPT | Performed by: NURSE PRACTITIONER

## 2017-12-27 PROCEDURE — 80053 COMPREHEN METABOLIC PANEL: CPT | Performed by: NURSE PRACTITIONER

## 2017-12-27 PROCEDURE — 99214 OFFICE O/P EST MOD 30 MIN: CPT | Performed by: NURSE PRACTITIONER

## 2017-12-27 PROCEDURE — 81003 URINALYSIS AUTO W/O SCOPE: CPT | Performed by: NURSE PRACTITIONER

## 2017-12-27 PROCEDURE — 85027 COMPLETE CBC AUTOMATED: CPT | Performed by: NURSE PRACTITIONER

## 2017-12-27 RX ORDER — AMOXICILLIN AND CLAVULANATE POTASSIUM 875; 125 MG/1; MG/1
1 TABLET, FILM COATED ORAL EVERY 12 HOURS SCHEDULED
Qty: 20 TABLET | Refills: 0 | Status: SHIPPED | OUTPATIENT
Start: 2017-12-27 | End: 2018-01-06

## 2017-12-27 RX ORDER — FLUCONAZOLE 150 MG/1
150 TABLET ORAL ONCE
Qty: 2 TABLET | Refills: 1 | Status: SHIPPED | OUTPATIENT
Start: 2017-12-27 | End: 2017-12-27

## 2017-12-27 RX ORDER — LORATADINE 10 MG/1
10 TABLET ORAL DAILY
Qty: 30 TABLET | Refills: 11 | Status: SHIPPED | OUTPATIENT
Start: 2017-12-27 | End: 2018-04-05 | Stop reason: ALTCHOICE

## 2017-12-28 LAB
ALBUMIN SERPL-MCNC: 3.9 G/DL (ref 3.4–4.8)
ALBUMIN/GLOB SERPL: 1.6 G/DL (ref 1.1–1.8)
ALP SERPL-CCNC: 109 U/L (ref 38–126)
ALT SERPL W P-5'-P-CCNC: 43 U/L (ref 9–52)
ANION GAP SERPL CALCULATED.3IONS-SCNC: 7 MMOL/L (ref 5–15)
AST SERPL-CCNC: 26 U/L (ref 14–36)
BILIRUB SERPL-MCNC: 0.1 MG/DL (ref 0.2–1.3)
BILIRUB UR QL STRIP: NEGATIVE
BUN BLD-MCNC: 15 MG/DL (ref 7–21)
BUN/CREAT SERPL: 22.1 (ref 7–25)
CALCIUM SPEC-SCNC: 9.5 MG/DL (ref 8.4–10.2)
CHLORIDE SERPL-SCNC: 104 MMOL/L (ref 95–110)
CLARITY UR: CLEAR
CO2 SERPL-SCNC: 26 MMOL/L (ref 22–31)
COLOR UR: YELLOW
CREAT BLD-MCNC: 0.68 MG/DL (ref 0.5–1)
DEPRECATED RDW RBC AUTO: 49.7 FL (ref 36.4–46.3)
ERYTHROCYTE [DISTWIDTH] IN BLOOD BY AUTOMATED COUNT: 14.2 % (ref 11.5–14.5)
GFR SERPL CREATININE-BSD FRML MDRD: 91 ML/MIN/1.73 (ref 51–120)
GLOBULIN UR ELPH-MCNC: 2.5 GM/DL (ref 2.3–3.5)
GLUCOSE BLD-MCNC: 97 MG/DL (ref 60–100)
GLUCOSE UR STRIP-MCNC: NEGATIVE MG/DL
HCT VFR BLD AUTO: 29.9 % (ref 35–45)
HGB BLD-MCNC: 9.8 G/DL (ref 12–15.5)
HGB UR QL STRIP.AUTO: NEGATIVE
KETONES UR QL STRIP: NEGATIVE
LEUKOCYTE ESTERASE UR QL STRIP.AUTO: NEGATIVE
MCH RBC QN AUTO: 31.5 PG (ref 26.5–34)
MCHC RBC AUTO-ENTMCNC: 32.8 G/DL (ref 31.4–36)
MCV RBC AUTO: 96.1 FL (ref 80–98)
NITRITE UR QL STRIP: NEGATIVE
PH UR STRIP.AUTO: 7 [PH] (ref 5–9)
PLATELET # BLD AUTO: 326 10*3/MM3 (ref 150–450)
PMV BLD AUTO: 10.6 FL (ref 8–12)
POTASSIUM BLD-SCNC: 4.6 MMOL/L (ref 3.5–5.1)
PROT SERPL-MCNC: 6.4 G/DL (ref 6.3–8.6)
PROT UR QL STRIP: NEGATIVE
RBC # BLD AUTO: 3.11 10*6/MM3 (ref 3.77–5.16)
SODIUM BLD-SCNC: 137 MMOL/L (ref 137–145)
SP GR UR STRIP: 1 (ref 1–1.03)
UROBILINOGEN UR QL STRIP: NORMAL
WBC NRBC COR # BLD: 7.71 10*3/MM3 (ref 3.2–9.8)

## 2018-01-08 ENCOUNTER — OFFICE VISIT (OUTPATIENT)
Dept: OBSTETRICS AND GYNECOLOGY | Facility: CLINIC | Age: 53
End: 2018-01-08

## 2018-01-08 VITALS
BODY MASS INDEX: 25.21 KG/M2 | WEIGHT: 137 LBS | DIASTOLIC BLOOD PRESSURE: 68 MMHG | HEIGHT: 62 IN | SYSTOLIC BLOOD PRESSURE: 112 MMHG

## 2018-01-08 DIAGNOSIS — F90.2 ATTENTION DEFICIT HYPERACTIVITY DISORDER (ADHD), COMBINED TYPE: Primary | Chronic | ICD-10-CM

## 2018-01-08 DIAGNOSIS — N95.1 MENOPAUSAL SYNDROME: Chronic | ICD-10-CM

## 2018-01-08 DIAGNOSIS — F17.200 SMOKER: Chronic | ICD-10-CM

## 2018-01-08 DIAGNOSIS — F31.60 BIPOLAR 1 DISORDER, MIXED (HCC): Chronic | ICD-10-CM

## 2018-01-08 DIAGNOSIS — F33.41 RECURRENT MAJOR DEPRESSIVE DISORDER, IN PARTIAL REMISSION (HCC): Chronic | ICD-10-CM

## 2018-01-08 PROCEDURE — 99214 OFFICE O/P EST MOD 30 MIN: CPT | Performed by: OBSTETRICS & GYNECOLOGY

## 2018-01-08 RX ORDER — DEXTROAMPHETAMINE SACCHARATE, AMPHETAMINE ASPARTATE, DEXTROAMPHETAMINE SULFATE AND AMPHETAMINE SULFATE 7.5; 7.5; 7.5; 7.5 MG/1; MG/1; MG/1; MG/1
30 TABLET ORAL
Qty: 60 TABLET | Refills: 0 | Status: SHIPPED | OUTPATIENT
Start: 2018-01-08 | End: 2018-02-21 | Stop reason: SDUPTHER

## 2018-01-08 NOTE — PROGRESS NOTES
Anh Koch is a 52 y.o. y/o female     Chief Complaint: ADHD, depression, menopausal, smoker    HPI: 52-year-old  with two miscarriages and two vaginal deliveries.  She has had a hysterectomy with BSO.       She smokes a half pack cigarettes per day, but has been able to cut back somewhat.  She has quit in the past, but then stress comes about, and she starts again.       She takes Zoloft 100 mg daily.      She is interested in quitting smoking.  She started Wellbutrin  mg twice daily, and she has cut back a little on her smoking.      She is taking Adderall 30 mg every morning, and she is not having any problems with that.    Review of Systems   Constitutional: Positive for fatigue. Negative for activity change, appetite change, chills, diaphoresis, fever and unexpected weight change.   Gastrointestinal: Negative for abdominal pain, constipation, diarrhea and nausea.   Genitourinary: Negative for difficulty urinating, dyspareunia, dysuria, pelvic pain, urgency, vaginal bleeding, vaginal discharge and vaginal pain.   Neurological: Negative for headaches.   Psychiatric/Behavioral: Positive for dysphoric mood. The patient is nervous/anxious.    All other systems reviewed and are negative.     Breast ROS: negative    The following portions of the patient's history were reviewed and updated as appropriate: allergies, current medications, past family history, past medical history, past social history, past surgical history and problem list.    No Known Allergies       Current Outpatient Prescriptions:   •  amphetamine-dextroamphetamine (ADDERALL) 30 MG tablet, Take 1 tablet by mouth 2 (Two) Times a Day., Disp: 60 tablet, Rfl: 0  •  buPROPion SR (WELLBUTRIN SR) 150 MG 12 hr tablet, Take 1 tablet by mouth 2 (Two) Times a Day., Disp: 60 tablet, Rfl: 11  •  CloNIDine (CATAPRES) 0.1 MG tablet, Take 1 tablet by mouth Every Night., Disp: 30 tablet, Rfl: 5  •  hydrochlorothiazide (HYDRODIURIL) 25 MG tablet, Take  1 tablet by mouth Daily., Disp: 30 tablet, Rfl: 5  •  lamoTRIgine (LaMICtal) 100 MG tablet, Take 100 mg by mouth Daily., Disp: , Rfl:   •  loratadine (CLARITIN) 10 MG tablet, Take 1 tablet by mouth Daily., Disp: 30 tablet, Rfl: 11  •  risperiDONE (RisperDAL) 2 MG tablet, Take 2 mg by mouth Daily., Disp: , Rfl:   •  sertraline (ZOLOFT) 100 MG tablet, 100 mg 2 (Two) Times a Day., Disp: , Rfl:      The patient has a family history of   Family History   Problem Relation Age of Onset   • Hypertension Mother    • Diabetes Mother    • Heart attack Father    • Sudden death Father    • Heart disease Father         Past Medical History:   Diagnosis Date   • Abnormal liver function    • Anxiety    • Attention deficit hyperactivity disorder (ADHD), combined type 10/23/2017   • Basal cell carcinoma    • Bipolar disorder    • Chronic hepatitis C     1a. HCV Fibrosure .04/F0, necroinflam .12/A0   • Elevated levels of transaminase & lactic acid dehydrogenase    • Hallux valgus with bunions    • Hepatitis C    • Kidney stone    • Low back pain    • Menopausal and female climacteric states 2017   • Recurrent major depressive disorder, in partial remission 2017   • Smoker 2017        OB History      Para Term  AB Living    5 2        SAB TAB Ectopic Multiple Live Births                   Social History     Social History   • Marital status: Single     Spouse name: N/A   • Number of children: N/A   • Years of education: N/A     Occupational History   • Not on file.     Social History Main Topics   • Smoking status: Current Every Day Smoker     Packs/day: 1.50     Years: 40.00     Types: Cigarettes     Start date:    • Smokeless tobacco: Never Used   • Alcohol use 1.8 oz/week     1 Glasses of wine, 1 Cans of beer, 1 Shots of liquor per week      Comment: Ocassionally   • Drug use: No   • Sexual activity: Not Currently     Other Topics Concern   • Not on file     Social History Narrative        Past  "Surgical History:   Procedure Laterality Date   • BUNIONECTOMY Right    • COLONOSCOPY  02/24/2016    Normal colon.No specimens.   • CYSTOSCOPY W/ URETEROSCOPY W/ LITHOTRIPSY     • PARTIAL HYSTERECTOMY     • TUBAL ABDOMINAL LIGATION          Patient Active Problem List   Diagnosis   • Chronic hepatitis C   • Hallux valgus with bunions   • Anxiety   • Bipolar 1 disorder, mixed   • Unintentional weight loss   • Menopausal and female climacteric states   • Smoker   • Recurrent major depressive disorder, in partial remission   • Attention deficit hyperactivity disorder (ADHD), combined type        Documented Vitals    01/08/18 1314   BP: 112/68   Weight: 62.1 kg (137 lb)   Height: 157.5 cm (62\")       Physical Exam   Constitutional: She is oriented to person, place, and time. She appears well-developed and well-nourished. No distress.   137 pounds with BMI 25.1.   HENT:   Head: Normocephalic and atraumatic.   Eyes: Conjunctivae and EOM are normal. Pupils are equal, round, and reactive to light.   Neck: Normal range of motion. Neck supple. No JVD present. No tracheal deviation present. No thyromegaly present.   Cardiovascular: Normal rate, regular rhythm, normal heart sounds and intact distal pulses.  Exam reveals no gallop and no friction rub.    No murmur heard.  Pulmonary/Chest: Effort normal and breath sounds normal. No stridor. No respiratory distress. She has no wheezes. She has no rales. She exhibits no tenderness.   Abdominal: Soft. Bowel sounds are normal. She exhibits no distension and no mass. There is no tenderness. There is no rebound and no guarding. No hernia.   Musculoskeletal: Normal range of motion. She exhibits no edema, tenderness or deformity.   Lymphadenopathy:     She has no cervical adenopathy.   Neurological: She is alert and oriented to person, place, and time. She has normal reflexes. She displays normal reflexes. No cranial nerve deficit. She exhibits normal muscle tone. Coordination normal. "   Skin: Skin is warm and dry. No rash noted. She is not diaphoretic. No erythema. No pallor.   Psychiatric: She has a normal mood and affect. Her behavior is normal. Judgment and thought content normal.   Nursing note and vitals reviewed.       Assessment        Diagnosis Plan   1. Attention deficit hyperactivity disorder (ADHD), combined type     2. Menopausal and female climacteric states     3. Bipolar 1 disorder, mixed     4. Smoker     5. Recurrent major depressive disorder, in partial remission           Plan      1. Adderall 30 mg once or twice a day.  2. Encouraged in healthy diet and exercise.  3. Counseled to quit smoking.   4. Follow-up in 6 weeks.  Follow-up sooner as needed.            This document has been electronically signed by Bharat Sagastume MD on January 8, 2018 1:51 PM

## 2018-01-08 NOTE — PROGRESS NOTES
Subjective   Anh Koch is a 52 y.o. female. She presents today for a follow up from recent hospitalization at [facility].  Has what sounds like a lower GI bleed.  Reports having dark black loose stools prior to her admission.  I do not have records from this admission.  Also reports that since she has been discharged from the hospital she has had URI symptoms.    URI    This is a new problem. The current episode started in the past 7 days. The problem has been waxing and waning. There has been no fever. Associated symptoms include congestion, coughing, headaches, rhinorrhea, sinus pain, sneezing, a sore throat and swollen glands. Pertinent negatives include no abdominal pain, chest pain, diarrhea, dysuria, ear pain, joint pain, joint swelling, nausea, neck pain, plugged ear sensation, rash, vomiting or wheezing. The treatment provided no relief.        The following portions of the patient's history were reviewed and updated as appropriate: allergies, current medications, past family history, past medical history, past social history, past surgical history and problem list.    Review of Systems   Constitutional: Negative.    HENT: Positive for congestion, postnasal drip, rhinorrhea, sinus pain, sinus pressure, sneezing and sore throat. Negative for ear pain.    Respiratory: Positive for cough. Negative for shortness of breath and wheezing.    Cardiovascular: Negative.  Negative for chest pain and palpitations.   Gastrointestinal: Positive for blood in stool. Negative for abdominal pain, diarrhea, nausea and vomiting.   Genitourinary: Negative for dysuria.   Musculoskeletal: Negative for joint pain and neck pain.   Skin: Negative.  Negative for rash.   Neurological: Positive for headaches.       Objective   Physical Exam   Constitutional: She is oriented to person, place, and time. Vital signs are normal. She appears well-developed and well-nourished. No distress.   HENT:   Head: Normocephalic.   Right Ear: External  ear normal. A middle ear effusion is present.   Left Ear: External ear normal. A middle ear effusion is present.   Nose: Mucosal edema and rhinorrhea present.   Mouth/Throat: Posterior oropharyngeal edema and posterior oropharyngeal erythema present.   Eyes: EOM are normal. Pupils are equal, round, and reactive to light.   Neck: Normal range of motion. Neck supple. No JVD present. No thyromegaly present.   Cardiovascular: Normal rate and regular rhythm.  Exam reveals no gallop and no friction rub.    No murmur heard.  Pulmonary/Chest: Effort normal and breath sounds normal. No respiratory distress. She has no wheezes. She has no rales.   Abdominal: Soft. She exhibits no distension and no mass. There is no tenderness. There is no rebound and no guarding. No hernia.   Musculoskeletal: Normal range of motion.   Neurological: She is alert and oriented to person, place, and time.   Skin: Skin is warm and dry. No rash noted. She is not diaphoretic. No erythema. No pallor.   Psychiatric: She has a normal mood and affect. Her behavior is normal. Judgment and thought content normal.   Nursing note and vitals reviewed.      Assessment/Plan   Anh was seen today for follow-up.    Diagnoses and all orders for this visit:    Acute non-recurrent maxillary sinusitis  -     amoxicillin-clavulanate (AUGMENTIN) 875-125 MG per tablet; Take 1 tablet by mouth Every 12 (Twelve) Hours for 10 days.    Black tarry stools  -     CBC (No Diff)  -     Comprehensive Metabolic Panel  -     Urinalysis With / Culture If Indicated - Urine, Clean Catch  -     Cancel: Urine Drug Screen - Urine, Clean Catch    Other orders  -     loratadine (CLARITIN) 10 MG tablet; Take 1 tablet by mouth Daily.  -     fluconazole (DIFLUCAN) 150 MG tablet; Take 1 tablet by mouth 1 (One) Time for 1 dose.    Lab following office visit.  Plenty of fluids.  Finish all antibiotics.  Claritin daily for allergy symptoms.  Diflucan PRN yeast symptoms.  Continue all other  current medications.  Follow up as scheduled for routine follow up.  Follow up sooner for problems/concerns.  Patient verbalized understanding and agreement with plan of care.        This document has been electronically signed by APOLINAR Mooney on January 7, 2018 9:38 PM

## 2018-02-21 ENCOUNTER — OFFICE VISIT (OUTPATIENT)
Dept: OBSTETRICS AND GYNECOLOGY | Facility: CLINIC | Age: 53
End: 2018-02-21

## 2018-02-21 VITALS
HEIGHT: 62 IN | DIASTOLIC BLOOD PRESSURE: 78 MMHG | SYSTOLIC BLOOD PRESSURE: 115 MMHG | BODY MASS INDEX: 25.14 KG/M2 | WEIGHT: 136.6 LBS

## 2018-02-21 DIAGNOSIS — F17.200 SMOKER: ICD-10-CM

## 2018-02-21 DIAGNOSIS — F33.41 RECURRENT MAJOR DEPRESSIVE DISORDER, IN PARTIAL REMISSION (HCC): ICD-10-CM

## 2018-02-21 DIAGNOSIS — N95.1 MENOPAUSAL SYNDROME: ICD-10-CM

## 2018-02-21 DIAGNOSIS — F90.2 ATTENTION DEFICIT HYPERACTIVITY DISORDER (ADHD), COMBINED TYPE: Primary | ICD-10-CM

## 2018-02-21 DIAGNOSIS — F31.60 BIPOLAR 1 DISORDER, MIXED (HCC): ICD-10-CM

## 2018-02-21 PROCEDURE — 99214 OFFICE O/P EST MOD 30 MIN: CPT | Performed by: OBSTETRICS & GYNECOLOGY

## 2018-02-21 RX ORDER — DEXTROAMPHETAMINE SACCHARATE, AMPHETAMINE ASPARTATE, DEXTROAMPHETAMINE SULFATE AND AMPHETAMINE SULFATE 7.5; 7.5; 7.5; 7.5 MG/1; MG/1; MG/1; MG/1
30 TABLET ORAL
Qty: 60 TABLET | Refills: 0 | Status: SHIPPED | OUTPATIENT
Start: 2018-02-21 | End: 2018-03-21 | Stop reason: SDUPTHER

## 2018-02-21 NOTE — PROGRESS NOTES
Anh Koch is a 53 y.o. y/o female     Chief Complaint: ADHD, depression, lethargy, menopausal, smoker    HPI: 53-year-old  with two miscarriages and two vaginal deliveries.  She has had a hysterectomy with BSO.        She smokes a half pack cigarettes per day, but has been able to cut back somewhat.  She has quit in the past, but then stress comes about, and she starts again.        She takes Zoloft 100 mg daily.      She is interested in quitting smoking.  She started Wellbutrin  mg twice daily, and she has cut back a little on her smoking.      She is taking Adderall 30 mg every morning, and she is not having any problems with that.    She is very lethargic.    She takes vitamin D3 and B12 supplementation.    We discussed trying to cut back on her Zoloft, and increasing Adderall to 30 mg every morning and noon time.    Review of Systems   Constitutional: Positive for fatigue. Negative for activity change, appetite change, chills, diaphoresis, fever and unexpected weight change.   Gastrointestinal: Negative for abdominal pain, constipation, diarrhea and nausea.   Genitourinary: Negative for difficulty urinating, dyspareunia, dysuria, menstrual problem, pelvic pain, urgency, vaginal bleeding, vaginal discharge and vaginal pain.   Neurological: Negative for headaches.   Psychiatric/Behavioral: Positive for dysphoric mood. The patient is nervous/anxious.    All other systems reviewed and are negative.     Breast ROS: negative    The following portions of the patient's history were reviewed and updated as appropriate: allergies, current medications, past family history, past medical history, past social history, past surgical history and problem list.    No Known Allergies       Current Outpatient Prescriptions:   •  amphetamine-dextroamphetamine (ADDERALL) 30 MG tablet, Take 1 tablet by mouth 2 (Two) Times a Day., Disp: 60 tablet, Rfl: 0  •  buPROPion SR (WELLBUTRIN SR) 150 MG 12 hr tablet, Take 1  tablet by mouth 2 (Two) Times a Day., Disp: 60 tablet, Rfl: 11  •  CloNIDine (CATAPRES) 0.1 MG tablet, Take 1 tablet by mouth Every Night., Disp: 30 tablet, Rfl: 5  •  hydrochlorothiazide (HYDRODIURIL) 25 MG tablet, Take 1 tablet by mouth Daily., Disp: 30 tablet, Rfl: 5  •  lamoTRIgine (LaMICtal) 100 MG tablet, Take 100 mg by mouth Daily., Disp: , Rfl:   •  loratadine (CLARITIN) 10 MG tablet, Take 1 tablet by mouth Daily., Disp: 30 tablet, Rfl: 11  •  risperiDONE (RisperDAL) 2 MG tablet, Take 2 mg by mouth Daily., Disp: , Rfl:   •  sertraline (ZOLOFT) 100 MG tablet, 100 mg 2 (Two) Times a Day., Disp: , Rfl:      The patient has a family history of   Family History   Problem Relation Age of Onset   • Hypertension Mother    • Diabetes Mother    • Heart attack Father    • Sudden death Father    • Heart disease Father         Past Medical History:   Diagnosis Date   • Abnormal liver function    • Anxiety    • Attention deficit hyperactivity disorder (ADHD), combined type 10/23/2017   • Basal cell carcinoma    • Bipolar disorder    • Chronic hepatitis C     1a. HCV Fibrosure .04/F0, necroinflam .12/A0   • Elevated levels of transaminase & lactic acid dehydrogenase    • Hallux valgus with bunions    • Hepatitis C    • Kidney stone    • Low back pain    • Menopausal and female climacteric states 2017   • Recurrent major depressive disorder, in partial remission 2017   • Smoker 2017        OB History      Para Term  AB Living    5 2        SAB TAB Ectopic Multiple Live Births                   Social History     Social History   • Marital status: Single     Spouse name: N/A   • Number of children: N/A   • Years of education: N/A     Occupational History   • Not on file.     Social History Main Topics   • Smoking status: Current Every Day Smoker     Packs/day: 1.50     Years: 40.00     Types: Cigarettes     Start date:    • Smokeless tobacco: Never Used   • Alcohol use 1.8 oz/week     1  "Glasses of wine, 1 Cans of beer, 1 Shots of liquor per week      Comment: Ocassionally   • Drug use: No   • Sexual activity: Not Currently     Other Topics Concern   • Not on file     Social History Narrative        Past Surgical History:   Procedure Laterality Date   • BUNIONECTOMY Right    • COLONOSCOPY  02/24/2016    Normal colon.No specimens.   • CYSTOSCOPY W/ URETEROSCOPY W/ LITHOTRIPSY     • PARTIAL HYSTERECTOMY     • TUBAL ABDOMINAL LIGATION          Patient Active Problem List   Diagnosis   • Chronic hepatitis C   • Hallux valgus with bunions   • Anxiety   • Bipolar 1 disorder, mixed   • Unintentional weight loss   • Menopausal and female climacteric states   • Smoker   • Recurrent major depressive disorder, in partial remission   • Attention deficit hyperactivity disorder (ADHD), combined type        Documented Vitals    02/21/18 1406   BP: 115/78   Weight: 62 kg (136 lb 9.6 oz)   Height: 157.5 cm (62\")       Physical Exam   Constitutional: She is oriented to person, place, and time. No distress.   Slightly overweight white female weighing 136.6 pounds with BMI 25.0   HENT:   Head: Normocephalic and atraumatic.   Eyes: Conjunctivae and EOM are normal. Pupils are equal, round, and reactive to light.   Neck: Normal range of motion. Neck supple. No JVD present. No tracheal deviation present. No thyromegaly present.   Cardiovascular: Normal rate, regular rhythm, normal heart sounds and intact distal pulses.  Exam reveals no gallop and no friction rub.    No murmur heard.  Pulmonary/Chest: Effort normal and breath sounds normal. No stridor. No respiratory distress. She has no wheezes. She has no rales. She exhibits no tenderness.   Abdominal: Soft. Bowel sounds are normal. She exhibits no distension and no mass. There is no tenderness. There is no rebound and no guarding. No hernia.   Musculoskeletal: Normal range of motion. She exhibits no edema, tenderness or deformity.   Lymphadenopathy:     She has no " cervical adenopathy.   Neurological: She is alert and oriented to person, place, and time. She has normal reflexes. She displays normal reflexes. No cranial nerve deficit. She exhibits normal muscle tone. Coordination normal.   Skin: Skin is warm and dry. No rash noted. She is not diaphoretic. No erythema. No pallor.   Psychiatric: She has a normal mood and affect. Her behavior is normal. Judgment and thought content normal.   Nursing note and vitals reviewed.       Assessment        Diagnosis Plan   1. Attention deficit hyperactivity disorder (ADHD), combined type     2. Menopausal and female climacteric states     3. Bipolar 1 disorder, mixed     4. Smoker     5. Recurrent major depressive disorder, in partial remission           Plan      1. Tried decreasing Zoloft.  2. Continue Wellbutrin  mg by mouth twice a day.  3. Continue vitamin D3 and B12 supplementation.  4. Increase Adderall to 30 mg every morning and every noon.    5. Encouraged in diet and exercise.  6. Handouts on depression, hot flashes, exercise, and vitamin use.   7. Follow-up in 4 weeks.  Follow-up sooner as needed.            This document has been electronically signed by Bharat Sagastume MD on February 21, 2018 2:37 PM

## 2018-03-21 ENCOUNTER — OFFICE VISIT (OUTPATIENT)
Dept: OBSTETRICS AND GYNECOLOGY | Facility: CLINIC | Age: 53
End: 2018-03-21

## 2018-03-21 VITALS
DIASTOLIC BLOOD PRESSURE: 84 MMHG | BODY MASS INDEX: 24.18 KG/M2 | SYSTOLIC BLOOD PRESSURE: 132 MMHG | HEIGHT: 62 IN | WEIGHT: 131.4 LBS

## 2018-03-21 DIAGNOSIS — F17.200 SMOKER: ICD-10-CM

## 2018-03-21 DIAGNOSIS — F31.60 BIPOLAR 1 DISORDER, MIXED (HCC): ICD-10-CM

## 2018-03-21 DIAGNOSIS — F90.2 ATTENTION DEFICIT HYPERACTIVITY DISORDER (ADHD), COMBINED TYPE: Primary | ICD-10-CM

## 2018-03-21 DIAGNOSIS — F33.41 RECURRENT MAJOR DEPRESSIVE DISORDER, IN PARTIAL REMISSION (HCC): ICD-10-CM

## 2018-03-21 DIAGNOSIS — N95.1 MENOPAUSAL SYNDROME: ICD-10-CM

## 2018-03-21 PROCEDURE — 99213 OFFICE O/P EST LOW 20 MIN: CPT | Performed by: OBSTETRICS & GYNECOLOGY

## 2018-03-21 RX ORDER — DEXTROAMPHETAMINE SACCHARATE, AMPHETAMINE ASPARTATE, DEXTROAMPHETAMINE SULFATE AND AMPHETAMINE SULFATE 7.5; 7.5; 7.5; 7.5 MG/1; MG/1; MG/1; MG/1
30 TABLET ORAL
Qty: 60 TABLET | Refills: 0 | Status: SHIPPED | OUTPATIENT
Start: 2018-03-21 | End: 2018-04-30 | Stop reason: SDUPTHER

## 2018-04-05 ENCOUNTER — OFFICE VISIT (OUTPATIENT)
Dept: FAMILY MEDICINE CLINIC | Facility: CLINIC | Age: 53
End: 2018-04-05

## 2018-04-05 VITALS
WEIGHT: 131 LBS | DIASTOLIC BLOOD PRESSURE: 70 MMHG | HEART RATE: 82 BPM | SYSTOLIC BLOOD PRESSURE: 126 MMHG | HEIGHT: 62 IN | RESPIRATION RATE: 20 BRPM | TEMPERATURE: 97.7 F | OXYGEN SATURATION: 96 % | BODY MASS INDEX: 24.11 KG/M2

## 2018-04-05 DIAGNOSIS — F33.41 RECURRENT MAJOR DEPRESSIVE DISORDER, IN PARTIAL REMISSION (HCC): Chronic | ICD-10-CM

## 2018-04-05 DIAGNOSIS — N95.1 MENOPAUSAL SYMPTOMS: ICD-10-CM

## 2018-04-05 DIAGNOSIS — B18.2 CHRONIC HEPATITIS C WITHOUT HEPATIC COMA (HCC): Chronic | ICD-10-CM

## 2018-04-05 DIAGNOSIS — H65.411 CHRONIC ALLERGIC OTITIS MEDIA OF RIGHT EAR: ICD-10-CM

## 2018-04-05 DIAGNOSIS — F41.9 ANXIETY: Chronic | ICD-10-CM

## 2018-04-05 DIAGNOSIS — F90.2 ATTENTION DEFICIT HYPERACTIVITY DISORDER (ADHD), COMBINED TYPE: Primary | Chronic | ICD-10-CM

## 2018-04-05 DIAGNOSIS — R41.3 MEMORY LOSS: ICD-10-CM

## 2018-04-05 DIAGNOSIS — F31.60 BIPOLAR 1 DISORDER, MIXED (HCC): Chronic | ICD-10-CM

## 2018-04-05 PROCEDURE — 99214 OFFICE O/P EST MOD 30 MIN: CPT | Performed by: NURSE PRACTITIONER

## 2018-04-05 RX ORDER — LEVOCETIRIZINE DIHYDROCHLORIDE 5 MG/1
5 TABLET, FILM COATED ORAL EVERY EVENING
Qty: 30 TABLET | Refills: 5 | Status: SHIPPED | OUTPATIENT
Start: 2018-04-05 | End: 2019-03-27 | Stop reason: ALTCHOICE

## 2018-04-05 RX ORDER — BISACODYL 5 MG
TABLET, DELAYED RELEASE (ENTERIC COATED) ORAL
COMMUNITY
Start: 2018-02-13 | End: 2018-04-05

## 2018-04-05 RX ORDER — HYDROCHLOROTHIAZIDE 25 MG/1
25 TABLET ORAL DAILY
Qty: 30 TABLET | Refills: 5 | Status: SHIPPED | OUTPATIENT
Start: 2018-04-05 | End: 2019-03-08 | Stop reason: SDUPTHER

## 2018-04-05 RX ORDER — CLONIDINE HYDROCHLORIDE 0.1 MG/1
0.1 TABLET ORAL NIGHTLY
Qty: 30 TABLET | Refills: 5 | Status: SHIPPED | OUTPATIENT
Start: 2018-04-05 | End: 2019-03-27 | Stop reason: SDUPTHER

## 2018-04-05 RX ORDER — POLYETHYLENE GLYCOL-3350 AND ELECTROLYTES 236; 6.74; 5.86; 2.97; 22.74 G/274.31G; G/274.31G; G/274.31G; G/274.31G; G/274.31G
POWDER, FOR SOLUTION ORAL
COMMUNITY
Start: 2018-02-13 | End: 2018-04-05

## 2018-04-05 NOTE — PROGRESS NOTES
Subjective   Anh Koch is a 53 y.o. female.     She presents today to discuss problems with her memory.  She brings a friend with her who states it is causing problems at home and at her job.  Reports that she will forget where she is or what time it is and then will argue with others regarding this.  She has been seeing Dr. Sagastume apparently and he has been managing her psych medications.  Previously was seen at [facility] but stopped seeing them and hasn't made a follow up appt.  Saw Dr. Tom there, but didn't feel like he listened to her and only wanted to just keep increasing her medications even though she was not tlerating them.  She would like a referral to see a different mental health provider if possible.  The friend she brings with her to the office today states that she feels like the Adderall that Dr. Sagastume has Ms. Koch taking is too much and she does much better on a lower dosage of the medication.      Memory Loss   This is a recurrent problem. The current episode started more than 1 year ago. The problem occurs constantly. The problem has been rapidly worsening. Associated symptoms include fatigue. Pertinent negatives include no abdominal pain, anorexia, arthralgias, change in bowel habit, chest pain, chills, congestion, coughing, diaphoresis, fever, headaches, joint swelling, myalgias, nausea, neck pain, numbness, rash, sore throat, swollen glands, urinary symptoms, vertigo, visual change, vomiting or weakness. Nothing aggravates the symptoms. She has tried nothing for the symptoms. The treatment provided no relief.        The following portions of the patient's history were reviewed and updated as appropriate: allergies, current medications, past family history, past medical history, past social history, past surgical history and problem list.    Review of Systems   Constitutional: Positive for fatigue. Negative for chills, diaphoresis and fever.   HENT: Positive for ear pain. Negative for  congestion and sore throat.    Eyes: Negative.    Respiratory: Negative.  Negative for cough.    Cardiovascular: Negative.  Negative for chest pain.   Gastrointestinal: Negative.  Negative for abdominal pain, anorexia, change in bowel habit, nausea and vomiting.   Musculoskeletal: Negative.  Negative for arthralgias, joint swelling, myalgias and neck pain.   Skin: Negative.  Negative for rash.   Allergic/Immunologic: Negative.    Neurological: Negative.  Positive for memory problem. Negative for vertigo, weakness, numbness and headaches.   Hematological: Negative.    Psychiatric/Behavioral: Negative.        Objective   Physical Exam   Constitutional: She is oriented to person, place, and time. Vital signs are normal. She appears well-developed and well-nourished. No distress.   HENT:   Head: Normocephalic.   Right Ear: External ear normal. A middle ear effusion is present.   Left Ear: External ear normal.   Nose: Nose normal.   Mouth/Throat: Oropharynx is clear and moist. No oropharyngeal exudate.   Eyes: Conjunctivae and EOM are normal. Pupils are equal, round, and reactive to light. Right eye exhibits no discharge. Left eye exhibits no discharge.   Neck: Normal range of motion. Neck supple. No tracheal deviation present. No thyromegaly present.   Cardiovascular: Normal rate, regular rhythm and normal heart sounds.  Exam reveals no gallop and no friction rub.    No murmur heard.  Pulmonary/Chest: Effort normal and breath sounds normal. No respiratory distress. She has no wheezes. She has no rales. She exhibits no tenderness.   Musculoskeletal: Normal range of motion.   Lymphadenopathy:     She has no cervical adenopathy.   Neurological: She is alert and oriented to person, place, and time.   Skin: Skin is warm and dry. Capillary refill takes less than 2 seconds. No rash noted. She is not diaphoretic. No erythema. No pallor.   Psychiatric: She has a normal mood and affect. Her behavior is normal. Judgment and  thought content normal.   Nursing note and vitals reviewed.        Assessment/Plan   Anh was seen today for follow-up.    Diagnoses and all orders for this visit:    Attention deficit hyperactivity disorder (ADHD), combined type  -     Ambulatory Referral to Psychiatry    Recurrent major depressive disorder, in partial remission  -     Ambulatory Referral to Psychiatry    Bipolar 1 disorder, mixed  -     Ambulatory Referral to Psychiatry    Anxiety  -     Ambulatory Referral to Psychiatry    Memory loss  -     Ambulatory Referral to Neurology    Chronic hepatitis C without hepatic coma    Chronic allergic otitis media of right ear  -     levocetirizine (XYZAL) 5 MG tablet; Take 1 tablet by mouth Every Evening.    Menopausal symptoms  -     CloNIDine (CATAPRES) 0.1 MG tablet; Take 1 tablet by mouth Every Night.    Other orders  -     hydrochlorothiazide (HYDRODIURIL) 25 MG tablet; Take 1 tablet by mouth Daily.    Referral to Dr. Whitley due to memory loss.  Referral to [facility] for psychiatric evaluation.  Dr. Sagastume is apparently trying to manage her psych medications and there is quite a bit of confusion about what she is and is not taking.    Continue current medications.  Follow up in 3 months for routine follow up.  Follow up sooner for problems/concerns.  Patient verbalized understanding and agreement with plan of care.        This document has been electronically signed by APOLINAR Mooney on April 5, 2018 3:32 PM

## 2018-04-15 NOTE — PROGRESS NOTES
Anh Koch is a 53 y.o. y/o female     Chief Complaint: ADHD, depression, lethargy, menopausal syndrome, smoker    HPI: 53-year-old  with two miscarriages and two vaginal deliveries.  She has had a hysterectomy with BSO.        She smokes a half pack cigarettes per day, but has been able to cut back somewhat.  She has quit in the past, but then stress comes about, and she starts again.        She takes Zoloft 100 mg daily.      She is interested in quitting smoking.  She started Wellbutrin  mg twice daily, and she has cut back a little on her smoking.      She is taking Adderall 30 mg every morning, and she is not having any problems with that.     She is very lethargic.     She takes vitamin D3 and B12 supplementation.     On 2018, we discussed trying to cut back on her Zoloft, and increasing Adderall to 30 mg every morning and noon time.      2018, she is not having any problems with this.    Review of Systems   Constitutional: Positive for fatigue. Negative for activity change, appetite change, chills, diaphoresis, fever and unexpected weight change.   Gastrointestinal: Negative for abdominal pain, constipation, diarrhea and nausea.   Genitourinary: Negative for difficulty urinating, dyspareunia, dysuria, pelvic pain, urgency, vaginal bleeding, vaginal discharge and vaginal pain.   Neurological: Negative for headaches.   Psychiatric/Behavioral: Positive for dysphoric mood. The patient is nervous/anxious.    All other systems reviewed and are negative.     Breast ROS: negative    The following portions of the patient's history were reviewed and updated as appropriate: allergies, current medications, past family history, past medical history, past social history, past surgical history and problem list.    No Known Allergies       Current Outpatient Prescriptions:   •  amphetamine-dextroamphetamine (ADDERALL) 30 MG tablet, Take 1 tablet by mouth 2 (Two) Times a Day., Disp: 60  tablet, Rfl: 0  •  CloNIDine (CATAPRES) 0.1 MG tablet, Take 1 tablet by mouth Every Night., Disp: 30 tablet, Rfl: 5  •  hydrochlorothiazide (HYDRODIURIL) 25 MG tablet, Take 1 tablet by mouth Daily., Disp: 30 tablet, Rfl: 5  •  levocetirizine (XYZAL) 5 MG tablet, Take 1 tablet by mouth Every Evening., Disp: 30 tablet, Rfl: 5     The patient has a family history of   Family History   Problem Relation Age of Onset   • Hypertension Mother    • Diabetes Mother    • Heart attack Father    • Sudden death Father    • Heart disease Father         Past Medical History:   Diagnosis Date   • Abnormal liver function    • Anxiety    • Attention deficit hyperactivity disorder (ADHD), combined type 10/23/2017   • Basal cell carcinoma    • Bipolar disorder    • Chronic hepatitis C     1a. HCV Fibrosure ., necroinflam .   • Elevated levels of transaminase & lactic acid dehydrogenase    • Hallux valgus with bunions    • Hepatitis C    • Kidney stone    • Low back pain    • Menopausal and female climacteric states 2017   • Recurrent major depressive disorder, in partial remission 2017   • Smoker 2017        OB History      Para Term  AB Living    5 2        SAB TAB Ectopic Molar Multiple Live Births                    Social History     Social History   • Marital status: Single     Spouse name: N/A   • Number of children: N/A   • Years of education: N/A     Occupational History   • Not on file.     Social History Main Topics   • Smoking status: Current Every Day Smoker     Packs/day: 1.50     Years: 40.00     Types: Cigarettes     Start date:    • Smokeless tobacco: Never Used   • Alcohol use 1.8 oz/week     1 Glasses of wine, 1 Cans of beer, 1 Shots of liquor per week      Comment: Ocassionally   • Drug use: No   • Sexual activity: Not Currently     Other Topics Concern   • Not on file     Social History Narrative   • No narrative on file        Past Surgical History:   Procedure Laterality  "Date   • BUNIONECTOMY Right    • COLONOSCOPY  02/24/2016    Normal colon.No specimens.   • CYSTOSCOPY W/ URETEROSCOPY W/ LITHOTRIPSY     • PARTIAL HYSTERECTOMY     • TUBAL ABDOMINAL LIGATION          Patient Active Problem List   Diagnosis   • Chronic hepatitis C   • Hallux valgus with bunions   • Anxiety   • Bipolar 1 disorder, mixed   • Unintentional weight loss   • Menopausal and female climacteric states   • Smoker   • Recurrent major depressive disorder, in partial remission   • Attention deficit hyperactivity disorder (ADHD), combined type        Documented Vitals    03/21/18 1422   BP: 132/84   Weight: 59.6 kg (131 lb 6.4 oz)   Height: 157.5 cm (62\")       Physical Exam   Constitutional: She is oriented to person, place, and time. She appears well-developed and well-nourished. No distress.   131.4 pounds with BMI 24.0.   HENT:   Head: Normocephalic and atraumatic.   Eyes: Conjunctivae and EOM are normal. Pupils are equal, round, and reactive to light.   Neck: Normal range of motion. Neck supple. No JVD present. No tracheal deviation present. No thyromegaly present.   Cardiovascular: Normal rate, regular rhythm, normal heart sounds and intact distal pulses.  Exam reveals no gallop and no friction rub.    No murmur heard.  Pulmonary/Chest: Effort normal and breath sounds normal. No stridor. No respiratory distress. She has no wheezes. She has no rales. She exhibits no tenderness.   Abdominal: Soft. Bowel sounds are normal. She exhibits no distension and no mass. There is no tenderness. There is no rebound and no guarding. No hernia.   Musculoskeletal: Normal range of motion. She exhibits no edema, tenderness or deformity.   Lymphadenopathy:     She has no cervical adenopathy.   Neurological: She is alert and oriented to person, place, and time. She has normal reflexes. She displays normal reflexes. No cranial nerve deficit. She exhibits normal muscle tone. Coordination normal.   Skin: Skin is warm and dry. No " rash noted. She is not diaphoretic. No erythema. No pallor.   Psychiatric: She has a normal mood and affect. Her behavior is normal. Judgment and thought content normal.   Nursing note and vitals reviewed.       Assessment        Diagnosis Plan   1. Attention deficit hyperactivity disorder (ADHD), combined type     2. Menopausal and female climacteric states     3. Bipolar 1 disorder, mixed     4. Smoker     5. Recurrent major depressive disorder, in partial remission           Plan      1. Continue Wellbutrin  mg twice a day.  2. Continue vitamin D3 and B12 supplementation.  3. Continue Adderall 30 mg every morning and every noon.  4. Encouraged in diet and exercise.   5. Follow-up in 4 weeks.  Follow-up sooner as needed.            This document has been electronically signed by Bharat Sagastume MD on April 15, 2018 3:17 PM

## 2018-04-30 ENCOUNTER — OFFICE VISIT (OUTPATIENT)
Dept: OBSTETRICS AND GYNECOLOGY | Facility: CLINIC | Age: 53
End: 2018-04-30

## 2018-04-30 VITALS
HEIGHT: 62 IN | WEIGHT: 131 LBS | SYSTOLIC BLOOD PRESSURE: 118 MMHG | BODY MASS INDEX: 24.11 KG/M2 | DIASTOLIC BLOOD PRESSURE: 70 MMHG

## 2018-04-30 DIAGNOSIS — N95.1 MENOPAUSAL SYNDROME: ICD-10-CM

## 2018-04-30 DIAGNOSIS — F31.60 BIPOLAR 1 DISORDER, MIXED (HCC): ICD-10-CM

## 2018-04-30 DIAGNOSIS — F17.200 SMOKER: ICD-10-CM

## 2018-04-30 DIAGNOSIS — F90.2 ATTENTION DEFICIT HYPERACTIVITY DISORDER (ADHD), COMBINED TYPE: Primary | ICD-10-CM

## 2018-04-30 PROCEDURE — 99213 OFFICE O/P EST LOW 20 MIN: CPT | Performed by: OBSTETRICS & GYNECOLOGY

## 2018-04-30 RX ORDER — DEXTROAMPHETAMINE SACCHARATE, AMPHETAMINE ASPARTATE, DEXTROAMPHETAMINE SULFATE AND AMPHETAMINE SULFATE 7.5; 7.5; 7.5; 7.5 MG/1; MG/1; MG/1; MG/1
30 TABLET ORAL
Qty: 60 TABLET | Refills: 0 | Status: SHIPPED | OUTPATIENT
Start: 2018-04-30 | End: 2018-06-04 | Stop reason: SDUPTHER

## 2018-04-30 NOTE — PROGRESS NOTES
Anh Koch is a 53 y.o. y/o female     Chief Complaint: ADHD, depression, lethargy, menopausal syndrome, smoker    HPI: 53-year-old  with two miscarriages and two vaginal deliveries.  She has had a hysterectomy with BSO.        She smokes a half pack cigarettes per day, but has been able to cut back somewhat.  She has quit in the past, but then stress comes about, and she starts again.        She takes Zoloft 100 mg daily.      She is interested in quitting smoking.  She started Wellbutrin  mg twice daily, and she has cut back a little on her smoking.      She is taking Adderall 30 mg every morning, and she is not having any problems with that.     She is very lethargic.     She takes vitamin D3 and B12 supplementation.     On 2018, we discussed trying to cut back on her Zoloft, and increasing Adderall to 30 mg every morning and noon time.       2018, she is not having any problems with this.    2018, I continued to counseled her to quit smoking.  She states that she is not having any problems with the Adderall.    Review of Systems   Constitutional: Positive for fatigue. Negative for activity change, appetite change, chills, diaphoresis, fever and unexpected weight change.   Gastrointestinal: Negative for abdominal pain, constipation, diarrhea and nausea.   Genitourinary: Negative for difficulty urinating, dyspareunia, dysuria, pelvic pain, urgency, vaginal bleeding, vaginal discharge and vaginal pain.   Neurological: Negative for headaches.   Psychiatric/Behavioral: Positive for dysphoric mood. The patient is nervous/anxious.    All other systems reviewed and are negative.     Breast ROS: negative    The following portions of the patient's history were reviewed and updated as appropriate: allergies, current medications, past family history, past medical history, past social history, past surgical history and problem list.    No Known Allergies       Current  Outpatient Prescriptions:   •  amphetamine-dextroamphetamine (ADDERALL) 30 MG tablet, Take 1 tablet by mouth 2 (Two) Times a Day., Disp: 60 tablet, Rfl: 0  •  CloNIDine (CATAPRES) 0.1 MG tablet, Take 1 tablet by mouth Every Night., Disp: 30 tablet, Rfl: 5  •  hydrochlorothiazide (HYDRODIURIL) 25 MG tablet, Take 1 tablet by mouth Daily., Disp: 30 tablet, Rfl: 5  •  levocetirizine (XYZAL) 5 MG tablet, Take 1 tablet by mouth Every Evening., Disp: 30 tablet, Rfl: 5     The patient has a family history of   Family History   Problem Relation Age of Onset   • Hypertension Mother    • Diabetes Mother    • Heart attack Father    • Sudden death Father    • Heart disease Father         Past Medical History:   Diagnosis Date   • Abnormal liver function    • Anxiety    • Attention deficit hyperactivity disorder (ADHD), combined type 10/23/2017   • Basal cell carcinoma    • Bipolar disorder    • Chronic hepatitis C     1a. HCV Fibrosure .04/F0, necroinflam .12/A0   • Elevated levels of transaminase & lactic acid dehydrogenase    • Hallux valgus with bunions    • Hepatitis C    • Kidney stone    • Low back pain    • Menopausal and female climacteric states 2017   • Recurrent major depressive disorder, in partial remission 2017   • Smoker 2017        OB History      Para Term  AB Living    5 2        SAB TAB Ectopic Molar Multiple Live Births                    Social History     Social History   • Marital status: Single     Spouse name: N/A   • Number of children: N/A   • Years of education: N/A     Occupational History   • Not on file.     Social History Main Topics   • Smoking status: Current Every Day Smoker     Packs/day: 1.50     Years: 40.00     Types: Cigarettes     Start date:    • Smokeless tobacco: Never Used   • Alcohol use 1.8 oz/week     1 Glasses of wine, 1 Cans of beer, 1 Shots of liquor per week      Comment: Ocassionally   • Drug use: No   • Sexual activity: Not Currently     Other  "Topics Concern   • Not on file     Social History Narrative   • No narrative on file        Past Surgical History:   Procedure Laterality Date   • BUNIONECTOMY Right    • COLONOSCOPY  02/24/2016    Normal colon.No specimens.   • CYSTOSCOPY W/ URETEROSCOPY W/ LITHOTRIPSY     • PARTIAL HYSTERECTOMY     • TUBAL ABDOMINAL LIGATION          Patient Active Problem List   Diagnosis   • Chronic hepatitis C   • Hallux valgus with bunions   • Anxiety   • Bipolar 1 disorder, mixed   • Unintentional weight loss   • Menopausal and female climacteric states   • Smoker   • Recurrent major depressive disorder, in partial remission   • Attention deficit hyperactivity disorder (ADHD), combined type        Documented Vitals    04/30/18 1327   BP: 118/70   Weight: 59.4 kg (131 lb)   Height: 157.5 cm (62\")   PainSc: 0-No pain       Physical Exam   Constitutional: She is oriented to person, place, and time. She appears well-developed and well-nourished. No distress.   Well-developed well-nourished white female weighing 131 pounds with BMI 24.0.   HENT:   Head: Normocephalic and atraumatic.   Eyes: Conjunctivae and EOM are normal. Pupils are equal, round, and reactive to light.   Neck: Normal range of motion. Neck supple. No JVD present. No tracheal deviation present. No thyromegaly present.   Cardiovascular: Normal rate, regular rhythm, normal heart sounds and intact distal pulses.  Exam reveals no gallop and no friction rub.    No murmur heard.  Pulmonary/Chest: Effort normal and breath sounds normal. No stridor. No respiratory distress. She has no wheezes. She has no rales. She exhibits no tenderness.   Abdominal: Soft. Bowel sounds are normal. She exhibits no distension and no mass. There is no tenderness. There is no rebound and no guarding. No hernia.   Musculoskeletal: Normal range of motion. She exhibits no edema, tenderness or deformity.   Lymphadenopathy:     She has no cervical adenopathy.   Neurological: She is alert and " oriented to person, place, and time. She has normal reflexes. She displays normal reflexes. No cranial nerve deficit. She exhibits normal muscle tone. Coordination normal.   Skin: Skin is warm and dry. No rash noted. She is not diaphoretic. No erythema. No pallor.   Psychiatric: She has a normal mood and affect. Her behavior is normal. Judgment and thought content normal.   Nursing note and vitals reviewed.       Assessment        Diagnosis Plan   1. Attention deficit hyperactivity disorder (ADHD), combined type     2. Menopausal and female climacteric states     3. Bipolar 1 disorder, mixed     4. Smoker           Plan      1. Continue Wellbutrin  mg twice a day.  2. Continue vitamin D3 and B12 supplementation.  3. Refilled Adderall 30 mg every morning and every noon.  4. Encouraged in diet and exercise.   5. Follow-up in 1 month.  Follow-up sooner as needed.            This document has been electronically signed by Bharat Sagastume MD on April 30, 2018 1:37 PM

## 2018-05-07 ENCOUNTER — TELEPHONE (OUTPATIENT)
Dept: FAMILY MEDICINE CLINIC | Facility: CLINIC | Age: 53
End: 2018-05-07

## 2018-05-07 NOTE — TELEPHONE ENCOUNTER
----- Message from Noe Shafer sent at 5/7/2018  3:02 PM CDT -----  Regarding: APPTS  PT LEFT VOICE MESSAGE ASKING STATUS OF DOCTORS APPTS THAT WERE TO BE MADE FOR HER

## 2018-06-04 ENCOUNTER — OFFICE VISIT (OUTPATIENT)
Dept: OBSTETRICS AND GYNECOLOGY | Facility: CLINIC | Age: 53
End: 2018-06-04

## 2018-06-04 VITALS
SYSTOLIC BLOOD PRESSURE: 108 MMHG | WEIGHT: 129 LBS | BODY MASS INDEX: 23.74 KG/M2 | DIASTOLIC BLOOD PRESSURE: 64 MMHG | HEIGHT: 62 IN

## 2018-06-04 DIAGNOSIS — F33.41 RECURRENT MAJOR DEPRESSIVE DISORDER, IN PARTIAL REMISSION (HCC): ICD-10-CM

## 2018-06-04 DIAGNOSIS — F31.60 BIPOLAR 1 DISORDER, MIXED (HCC): ICD-10-CM

## 2018-06-04 DIAGNOSIS — F17.200 SMOKER: ICD-10-CM

## 2018-06-04 DIAGNOSIS — N95.1 MENOPAUSAL SYNDROME: ICD-10-CM

## 2018-06-04 DIAGNOSIS — F90.2 ATTENTION DEFICIT HYPERACTIVITY DISORDER (ADHD), COMBINED TYPE: Primary | ICD-10-CM

## 2018-06-04 PROCEDURE — 99213 OFFICE O/P EST LOW 20 MIN: CPT | Performed by: OBSTETRICS & GYNECOLOGY

## 2018-06-04 RX ORDER — DEXTROAMPHETAMINE SACCHARATE, AMPHETAMINE ASPARTATE, DEXTROAMPHETAMINE SULFATE AND AMPHETAMINE SULFATE 7.5; 7.5; 7.5; 7.5 MG/1; MG/1; MG/1; MG/1
30 TABLET ORAL
Qty: 60 TABLET | Refills: 0 | Status: SHIPPED | OUTPATIENT
Start: 2018-06-04 | End: 2018-07-09 | Stop reason: SDUPTHER

## 2018-06-19 NOTE — PROGRESS NOTES
Anh Koch is a 53 y.o. y/o female     Chief Complaint: ADHD, depression, lethargy, menopausal syndrome, smoker    HPI: 53-year-old  with two miscarriages and two vaginal deliveries.  She has had a hysterectomy with BSO.        She smokes a half pack cigarettes per day, but has been able to cut back somewhat.  She has quit in the past, but then stress comes about, and she starts again.        She takes Zoloft 100 mg daily.      She is interested in quitting smoking.  She started Wellbutrin  mg twice daily, and she has cut back a little on her smoking.      She is taking Adderall 30 mg every morning, and she is not having any problems with that.     She is very lethargic.     She takes vitamin D3 and B12 supplementation.     On 2018, we discussed trying to cut back on her Zoloft, and increasing Adderall to 30 mg every morning and noon time.       2018, she is not having any problems with this.     2018, I continued to counseled her to quit smoking.  She states that she is not having any problems with the Adderall.    2018, she lost 2 pounds.  Continue to  her to quit smoking.    Review of Systems   Constitutional: Positive for fatigue. Negative for activity change, appetite change, chills, diaphoresis, fever and unexpected weight change.   Gastrointestinal: Negative for abdominal pain, constipation, diarrhea and nausea.   Genitourinary: Negative for difficulty urinating, dyspareunia, dysuria, pelvic pain, urgency, vaginal bleeding, vaginal discharge and vaginal pain.   Neurological: Negative for headaches.   Psychiatric/Behavioral: Positive for dysphoric mood. The patient is nervous/anxious.    All other systems reviewed and are negative.     Breast ROS: negative    The following portions of the patient's history were reviewed and updated as appropriate: allergies, current medications, past family history, past medical history, past social history, past  surgical history and problem list.    No Known Allergies       Current Outpatient Prescriptions:   •  amphetamine-dextroamphetamine (ADDERALL) 30 MG tablet, Take 1 tablet by mouth 2 (Two) Times a Day., Disp: 60 tablet, Rfl: 0  •  CloNIDine (CATAPRES) 0.1 MG tablet, Take 1 tablet by mouth Every Night., Disp: 30 tablet, Rfl: 5  •  hydrochlorothiazide (HYDRODIURIL) 25 MG tablet, Take 1 tablet by mouth Daily., Disp: 30 tablet, Rfl: 5  •  levocetirizine (XYZAL) 5 MG tablet, Take 1 tablet by mouth Every Evening., Disp: 30 tablet, Rfl: 5     The patient has a family history of   Family History   Problem Relation Age of Onset   • Hypertension Mother    • Diabetes Mother    • Heart attack Father    • Sudden death Father    • Heart disease Father         Past Medical History:   Diagnosis Date   • Abnormal liver function    • Anxiety    • Attention deficit hyperactivity disorder (ADHD), combined type 10/23/2017   • Basal cell carcinoma    • Bipolar disorder    • Chronic hepatitis C     1a. HCV Fibrosure .04/F0, necroinflam .12/A0   • Elevated levels of transaminase & lactic acid dehydrogenase    • Hallux valgus with bunions    • Hepatitis C    • Kidney stone    • Low back pain    • Menopausal and female climacteric states 2017   • Recurrent major depressive disorder, in partial remission 2017   • Smoker 2017        OB History      Para Term  AB Living    5 2            SAB TAB Ectopic Molar Multiple Live Births                          Social History     Social History   • Marital status: Single     Spouse name: N/A   • Number of children: N/A   • Years of education: N/A     Occupational History   • Not on file.     Social History Main Topics   • Smoking status: Current Every Day Smoker     Packs/day: 1.50     Years: 40.00     Types: Cigarettes     Start date:    • Smokeless tobacco: Never Used   • Alcohol use 1.8 oz/week     1 Glasses of wine, 1 Cans of beer, 1 Shots of liquor per week       "Comment: Ocassionally   • Drug use: No   • Sexual activity: Not Currently     Other Topics Concern   • Not on file     Social History Narrative   • No narrative on file        Past Surgical History:   Procedure Laterality Date   • BUNIONECTOMY Right    • COLONOSCOPY  02/24/2016    Normal colon.No specimens.   • CYSTOSCOPY W/ URETEROSCOPY W/ LITHOTRIPSY     • PARTIAL HYSTERECTOMY     • TUBAL ABDOMINAL LIGATION          Patient Active Problem List   Diagnosis   • Chronic hepatitis C   • Hallux valgus with bunions   • Anxiety   • Bipolar 1 disorder, mixed   • Unintentional weight loss   • Menopausal and female climacteric states   • Smoker   • Recurrent major depressive disorder, in partial remission   • Attention deficit hyperactivity disorder (ADHD), combined type        Documented Vitals    06/04/18 1345   BP: 108/64   Weight: 58.5 kg (129 lb)   Height: 157.5 cm (62\")       Physical Exam   Constitutional: She is oriented to person, place, and time. She appears well-developed and well-nourished. No distress.   129 pounds with BMI 24.0   HENT:   Head: Normocephalic and atraumatic.   Eyes: Conjunctivae and EOM are normal. Pupils are equal, round, and reactive to light.   Neck: Normal range of motion. Neck supple. No JVD present. No tracheal deviation present. No thyromegaly present.   Cardiovascular: Normal rate, regular rhythm, normal heart sounds and intact distal pulses.  Exam reveals no gallop and no friction rub.    No murmur heard.  Pulmonary/Chest: Effort normal and breath sounds normal. No stridor. No respiratory distress. She has no wheezes. She has no rales. She exhibits no tenderness.   Abdominal: Soft. Bowel sounds are normal. She exhibits no distension and no mass. There is no tenderness. There is no rebound and no guarding. No hernia.   Musculoskeletal: Normal range of motion. She exhibits no edema, tenderness or deformity.   Lymphadenopathy:     She has no cervical adenopathy.   Neurological: She is " alert and oriented to person, place, and time. She has normal reflexes. She displays normal reflexes. No cranial nerve deficit. She exhibits normal muscle tone. Coordination normal.   Skin: Skin is warm and dry. No rash noted. She is not diaphoretic. No erythema. No pallor.   Psychiatric: She has a normal mood and affect. Her behavior is normal. Judgment and thought content normal.   Nursing note and vitals reviewed.       Assessment        Diagnosis Plan   1. Attention deficit hyperactivity disorder (ADHD), combined type     2. Menopausal and female climacteric states     3. Bipolar 1 disorder, mixed     4. Smoker     5. Recurrent major depressive disorder, in partial remission           Plan      1. Refilled Adderall 30 mg every morning and every noon.  2. Continue Wellbutrin  mg twice a day.  3. Continue vitamin D3 and B12 supplementation.  4. Encouraged in diet and exercise.   5. Follow-up in 4 weeks.  Follow-up sooner as needed.            This document has been electronically signed by Bharat Sagastume MD on June 19, 2018 8:14 AM

## 2018-07-09 ENCOUNTER — OFFICE VISIT (OUTPATIENT)
Dept: OBSTETRICS AND GYNECOLOGY | Facility: CLINIC | Age: 53
End: 2018-07-09

## 2018-07-09 VITALS
WEIGHT: 127 LBS | HEIGHT: 62 IN | BODY MASS INDEX: 23.37 KG/M2 | DIASTOLIC BLOOD PRESSURE: 69 MMHG | SYSTOLIC BLOOD PRESSURE: 134 MMHG

## 2018-07-09 DIAGNOSIS — F33.41 RECURRENT MAJOR DEPRESSIVE DISORDER, IN PARTIAL REMISSION (HCC): ICD-10-CM

## 2018-07-09 DIAGNOSIS — F31.60 BIPOLAR 1 DISORDER, MIXED (HCC): ICD-10-CM

## 2018-07-09 DIAGNOSIS — F90.2 ATTENTION DEFICIT HYPERACTIVITY DISORDER (ADHD), COMBINED TYPE: Primary | ICD-10-CM

## 2018-07-09 DIAGNOSIS — N95.1 MENOPAUSAL SYNDROME: ICD-10-CM

## 2018-07-09 DIAGNOSIS — F17.200 SMOKER: ICD-10-CM

## 2018-07-09 PROCEDURE — 99213 OFFICE O/P EST LOW 20 MIN: CPT | Performed by: OBSTETRICS & GYNECOLOGY

## 2018-07-09 RX ORDER — DEXTROAMPHETAMINE SACCHARATE, AMPHETAMINE ASPARTATE, DEXTROAMPHETAMINE SULFATE AND AMPHETAMINE SULFATE 7.5; 7.5; 7.5; 7.5 MG/1; MG/1; MG/1; MG/1
30 TABLET ORAL
Qty: 60 TABLET | Refills: 0 | Status: SHIPPED | OUTPATIENT
Start: 2018-07-09 | End: 2018-08-06 | Stop reason: SDUPTHER

## 2018-07-09 RX ORDER — BUPROPION HYDROCHLORIDE 150 MG/1
150 TABLET, EXTENDED RELEASE ORAL 2 TIMES DAILY
Qty: 60 TABLET | Refills: 12 | Status: SHIPPED | OUTPATIENT
Start: 2018-07-09 | End: 2018-11-14 | Stop reason: SDUPTHER

## 2018-08-06 ENCOUNTER — OFFICE VISIT (OUTPATIENT)
Dept: OBSTETRICS AND GYNECOLOGY | Facility: CLINIC | Age: 53
End: 2018-08-06

## 2018-08-06 VITALS
DIASTOLIC BLOOD PRESSURE: 64 MMHG | BODY MASS INDEX: 23.37 KG/M2 | SYSTOLIC BLOOD PRESSURE: 122 MMHG | WEIGHT: 127 LBS | HEIGHT: 62 IN

## 2018-08-06 DIAGNOSIS — F31.60 BIPOLAR 1 DISORDER, MIXED (HCC): ICD-10-CM

## 2018-08-06 DIAGNOSIS — F17.200 SMOKER: ICD-10-CM

## 2018-08-06 DIAGNOSIS — N95.1 MENOPAUSAL SYNDROME: ICD-10-CM

## 2018-08-06 DIAGNOSIS — F33.41 RECURRENT MAJOR DEPRESSIVE DISORDER, IN PARTIAL REMISSION (HCC): ICD-10-CM

## 2018-08-06 DIAGNOSIS — F90.2 ATTENTION DEFICIT HYPERACTIVITY DISORDER (ADHD), COMBINED TYPE: Primary | ICD-10-CM

## 2018-08-06 PROCEDURE — 99213 OFFICE O/P EST LOW 20 MIN: CPT | Performed by: OBSTETRICS & GYNECOLOGY

## 2018-08-06 RX ORDER — DEXTROAMPHETAMINE SACCHARATE, AMPHETAMINE ASPARTATE, DEXTROAMPHETAMINE SULFATE AND AMPHETAMINE SULFATE 7.5; 7.5; 7.5; 7.5 MG/1; MG/1; MG/1; MG/1
30 TABLET ORAL
Qty: 60 TABLET | Refills: 0 | Status: SHIPPED | OUTPATIENT
Start: 2018-08-06 | End: 2018-09-05 | Stop reason: SDUPTHER

## 2018-08-06 RX ORDER — DEXTROAMPHETAMINE SACCHARATE, AMPHETAMINE ASPARTATE, DEXTROAMPHETAMINE SULFATE AND AMPHETAMINE SULFATE 7.5; 7.5; 7.5; 7.5 MG/1; MG/1; MG/1; MG/1
30 TABLET ORAL
Qty: 60 TABLET | Refills: 0 | Status: SHIPPED | OUTPATIENT
Start: 2018-08-06 | End: 2018-08-06 | Stop reason: SDUPTHER

## 2018-08-24 ENCOUNTER — OFFICE VISIT (OUTPATIENT)
Dept: FAMILY MEDICINE CLINIC | Facility: CLINIC | Age: 53
End: 2018-08-24

## 2018-08-24 VITALS
HEART RATE: 93 BPM | BODY MASS INDEX: 23.55 KG/M2 | SYSTOLIC BLOOD PRESSURE: 120 MMHG | DIASTOLIC BLOOD PRESSURE: 70 MMHG | TEMPERATURE: 98.2 F | HEIGHT: 62 IN | RESPIRATION RATE: 20 BRPM | OXYGEN SATURATION: 98 % | WEIGHT: 128 LBS

## 2018-08-24 DIAGNOSIS — Z12.31 ENCOUNTER FOR SCREENING MAMMOGRAM FOR BREAST CANCER: ICD-10-CM

## 2018-08-24 DIAGNOSIS — Z13.820 SCREENING FOR OSTEOPOROSIS: ICD-10-CM

## 2018-08-24 DIAGNOSIS — F31.60 BIPOLAR 1 DISORDER, MIXED (HCC): Chronic | ICD-10-CM

## 2018-08-24 DIAGNOSIS — Z13.1 SCREENING FOR DIABETES MELLITUS: ICD-10-CM

## 2018-08-24 DIAGNOSIS — Z13.29 SCREENING FOR THYROID DISORDER: ICD-10-CM

## 2018-08-24 DIAGNOSIS — B18.2 CHRONIC HEPATITIS C WITHOUT HEPATIC COMA (HCC): Chronic | ICD-10-CM

## 2018-08-24 DIAGNOSIS — F41.9 ANXIETY: Chronic | ICD-10-CM

## 2018-08-24 DIAGNOSIS — R41.3 MEMORY LOSS: Primary | ICD-10-CM

## 2018-08-24 DIAGNOSIS — F90.2 ATTENTION DEFICIT HYPERACTIVITY DISORDER (ADHD), COMBINED TYPE: Chronic | ICD-10-CM

## 2018-08-24 DIAGNOSIS — F33.41 RECURRENT MAJOR DEPRESSIVE DISORDER, IN PARTIAL REMISSION (HCC): Chronic | ICD-10-CM

## 2018-08-24 PROCEDURE — 99214 OFFICE O/P EST MOD 30 MIN: CPT | Performed by: NURSE PRACTITIONER

## 2018-09-05 ENCOUNTER — OFFICE VISIT (OUTPATIENT)
Dept: OBSTETRICS AND GYNECOLOGY | Facility: CLINIC | Age: 53
End: 2018-09-05

## 2018-09-05 VITALS
SYSTOLIC BLOOD PRESSURE: 122 MMHG | WEIGHT: 128.4 LBS | HEIGHT: 62 IN | BODY MASS INDEX: 23.63 KG/M2 | DIASTOLIC BLOOD PRESSURE: 60 MMHG

## 2018-09-05 DIAGNOSIS — F90.2 ATTENTION DEFICIT HYPERACTIVITY DISORDER (ADHD), COMBINED TYPE: Primary | ICD-10-CM

## 2018-09-05 DIAGNOSIS — N95.1 MENOPAUSAL SYNDROME: ICD-10-CM

## 2018-09-05 DIAGNOSIS — F31.60 BIPOLAR 1 DISORDER, MIXED (HCC): ICD-10-CM

## 2018-09-05 DIAGNOSIS — F17.200 SMOKER: ICD-10-CM

## 2018-09-05 DIAGNOSIS — F33.41 RECURRENT MAJOR DEPRESSIVE DISORDER, IN PARTIAL REMISSION (HCC): ICD-10-CM

## 2018-09-05 PROCEDURE — 99213 OFFICE O/P EST LOW 20 MIN: CPT | Performed by: OBSTETRICS & GYNECOLOGY

## 2018-09-05 RX ORDER — DEXTROAMPHETAMINE SACCHARATE, AMPHETAMINE ASPARTATE, DEXTROAMPHETAMINE SULFATE AND AMPHETAMINE SULFATE 7.5; 7.5; 7.5; 7.5 MG/1; MG/1; MG/1; MG/1
30 TABLET ORAL
Qty: 60 TABLET | Refills: 0 | Status: SHIPPED | OUTPATIENT
Start: 2018-09-05 | End: 2018-10-10 | Stop reason: SDUPTHER

## 2018-09-05 NOTE — PROGRESS NOTES
Anh Koch   Anh Koch is a 53 y.o. y/o female     Chief Complaint: ADHD, depression, lethargy, menopausal syndrome, smoker    HPI: 53-year-old  with two miscarriages and two vaginal deliveries.  She has had a hysterectomy with BSO.        She smokes a half pack cigarettes per day, but has been able to cut back somewhat.  She has quit in the past, but then stress comes about, and she starts again.        She takes Zoloft 100 mg daily.      She is interested in quitting smoking.  She started Wellbutrin  mg twice daily, and she has cut back a little on her smoking.      She is taking Adderall 30 mg every morning, and she is not having any problems with that.     She is very lethargic.     She takes vitamin D3 and B12 supplementation.     On 2018, we discussed trying to cut back on her Zoloft, and increasing Adderall to 30 mg every morning and noon time.       2018, she is not having any problems with this.     2018, I continued to counseled her to quit smoking.  She states that she is not having any problems with the Adderall.    2018, she lost 2 pounds.  Continue to  her to quit smoking.    2018, she lost 2 pounds.  I am continuing to  her to quit smoking.    2018, her weight remains the same.  She is still smoking the same.  She has been counseled to quit.    2018, she gained 1-1/2 pounds.  Still smoking in spite of being counseled to quit.    Review of Systems   Constitutional: Positive for fatigue. Negative for activity change, appetite change, chills, diaphoresis, fever and unexpected weight change.   Gastrointestinal: Negative for abdominal pain, constipation, diarrhea and nausea.   Genitourinary: Negative for difficulty urinating, dyspareunia, dysuria, pelvic pain, urgency, vaginal bleeding, vaginal discharge and vaginal pain.   Neurological: Negative for headaches.   Psychiatric/Behavioral: Positive for  dysphoric mood. The patient is nervous/anxious.    All other systems reviewed and are negative.     Breast ROS: negative    The following portions of the patient's history were reviewed and updated as appropriate: allergies, current medications, past family history, past medical history, past social history, past surgical history and problem list.    No Known Allergies       Current Outpatient Prescriptions:   •  amphetamine-dextroamphetamine (ADDERALL) 30 MG tablet, Take 1 tablet by mouth 2 (Two) Times a Day., Disp: 60 tablet, Rfl: 0  •  buPROPion SR (WELLBUTRIN SR) 150 MG 12 hr tablet, Take 1 tablet by mouth 2 (Two) Times a Day., Disp: 60 tablet, Rfl: 12  •  CloNIDine (CATAPRES) 0.1 MG tablet, Take 1 tablet by mouth Every Night., Disp: 30 tablet, Rfl: 5  •  hydrochlorothiazide (HYDRODIURIL) 25 MG tablet, Take 1 tablet by mouth Daily., Disp: 30 tablet, Rfl: 5  •  levocetirizine (XYZAL) 5 MG tablet, Take 1 tablet by mouth Every Evening., Disp: 30 tablet, Rfl: 5     The patient has a family history of   Family History   Problem Relation Age of Onset   • Hypertension Mother    • Diabetes Mother    • Heart attack Father    • Sudden death Father    • Heart disease Father         Past Medical History:   Diagnosis Date   • Abnormal liver function    • Anxiety    • Attention deficit hyperactivity disorder (ADHD), combined type 10/23/2017   • Basal cell carcinoma    • Bipolar disorder (CMS/HCC)    • Chronic hepatitis C (CMS/HCC)     1a. HCV Fibrosure .04/F0, necroinflam .12/A0   • Elevated levels of transaminase & lactic acid dehydrogenase    • Hallux valgus with bunions    • Hepatitis C    • Kidney stone    • Low back pain    • Menopausal and female climacteric states 2017   • Recurrent major depressive disorder, in partial remission (CMS/HCC) 2017   • Smoker 2017        OB History      Para Term  AB Living    5 2            SAB TAB Ectopic Molar Multiple Live Births                       "    Social History     Social History   • Marital status: Single     Spouse name: N/A   • Number of children: N/A   • Years of education: N/A     Occupational History   • Not on file.     Social History Main Topics   • Smoking status: Current Every Day Smoker     Packs/day: 1.50     Years: 40.00     Types: Cigarettes     Start date: 1981   • Smokeless tobacco: Never Used   • Alcohol use 1.8 oz/week     1 Glasses of wine, 1 Cans of beer, 1 Shots of liquor per week      Comment: Ocassionally   • Drug use: No   • Sexual activity: Not Currently     Other Topics Concern   • Not on file     Social History Narrative   • No narrative on file        Past Surgical History:   Procedure Laterality Date   • BUNIONECTOMY Right    • COLONOSCOPY  02/24/2016    Normal colon.No specimens.   • CYSTOSCOPY W/ URETEROSCOPY W/ LITHOTRIPSY     • PARTIAL HYSTERECTOMY     • TUBAL ABDOMINAL LIGATION          Patient Active Problem List   Diagnosis   • Chronic hepatitis C (CMS/HCC)   • Hallux valgus with bunions   • Anxiety   • Bipolar 1 disorder, mixed (CMS/HCC)   • Unintentional weight loss   • Menopausal and female climacteric states   • Smoker   • Recurrent major depressive disorder, in partial remission (CMS/HCC)   • Attention deficit hyperactivity disorder (ADHD), combined type        Documented Vitals    09/05/18 1337   BP: 122/60   Weight: 58.2 kg (128 lb 6.4 oz)   Height: 157.5 cm (62\")       Physical Exam   Constitutional: She is oriented to person, place, and time. She appears well-developed and well-nourished. No distress.    08531.4 pounds with BMI 23.5  Blood pressure 122/60.  Pulse 84.  HENT:   Head: Normocephalic and atraumatic.   Eyes: Conjunctivae and EOM are normal. Pupils are equal, round, and reactive to light.   Neck: Normal range of motion. Neck supple. No JVD present. No tracheal deviation present. No thyromegaly present.   Cardiovascular: Normal rate, regular rhythm, normal heart sounds and intact distal pulses.  Exam " reveals no gallop and no friction rub.    No murmur heard.  Pulmonary/Chest: Effort normal and breath sounds normal. No stridor. No respiratory distress. She has no wheezes. She has no rales. She exhibits no tenderness.   Abdominal: Soft. Bowel sounds are normal. She exhibits no distension and no mass. There is no tenderness. There is no rebound and no guarding. No hernia.   Musculoskeletal: Normal range of motion. She exhibits no edema, tenderness or deformity.   Lymphadenopathy:     She has no cervical adenopathy.   Neurological: She is alert and oriented to person, place, and time. She has normal reflexes. She displays normal reflexes. No cranial nerve deficit. She exhibits normal muscle tone. Coordination normal.   Skin: Skin is warm and dry. No rash noted. She is not diaphoretic. No erythema. No pallor.   Psychiatric: She has a normal mood and affect. Her behavior is normal. Judgment and thought content normal.   Nursing note and vitals reviewed.       Assessment        Diagnosis Plan   1. Attention deficit hyperactivity disorder (ADHD), combined type     2. Menopausal and female climacteric states     3. Bipolar 1 disorder, mixed (CMS/HCC)     4. Smoker     5. Recurrent major depressive disorder, in partial remission (CMS/HCC)           Plan      1. Refilled Adderall 30 mg every morning and every noon.  2. Continue Wellbutrin  mg twice a day.  3. Continue vitamin D3 and B12 supplementation.  4. Encouraged in diet and exercise.   5. Follow-up in 4 weeks.  Follow-up sooner as needed.            This document has been electronically signed by Bharat Sagastume MD on September 5, 2018 2:09 PM        Review of Systems     Physical Exam   Review of Systems        Physical Exam

## 2018-09-10 NOTE — PROGRESS NOTES
Subjective   Anh Koch is a 53 y.o. female.     She presents today for some problems with memory that she has noticed recently.  She has come for this similar complaint in the past, and we have made several referrals, however, she has yet to see any of the specialists that we have referred her to.  She report chronic fatigue and memory loss.  She does have a history of ADHD and bipolar disorder, however, this is currently managed by her OBGYN.  I had put in a referral to mental health as well as neurology, but she missed both of these appointments.  She also suffers from chronic hep C and we have referred her to GI, but she has yet to follow through with these appointments to get approved for treatment of this.  She is due for labs.  She is also due for her screening mammo and DXA scan.  She reports that she is doing well on her current medications without side effects.      Anxiety   Presents for follow-up visit. Symptoms include confusion, decreased concentration, depressed mood, excessive worry, irritability, nervous/anxious behavior, panic and restlessness. Patient reports no chest pain, compulsions, dizziness, dry mouth, feeling of choking, hyperventilation, impotence, insomnia, malaise, muscle tension, nausea, obsessions, palpitations, shortness of breath or suicidal ideas. Symptoms occur constantly. The severity of symptoms is interfering with daily activities. The quality of sleep is fair.       Memory Loss   This is a new problem. The current episode started more than 1 month ago. The problem occurs constantly. The problem has been waxing and waning. Associated symptoms include fatigue. Pertinent negatives include no abdominal pain, anorexia, arthralgias, change in bowel habit, chest pain, chills, congestion, coughing, diaphoresis, fever, headaches, joint swelling, myalgias, nausea, neck pain, numbness, rash, sore throat, swollen glands, urinary symptoms, vertigo, visual change, vomiting or weakness.  Nothing aggravates the symptoms. She has tried nothing for the symptoms. The treatment provided no relief.   Hypertension   This is a chronic problem. The current episode started more than 1 year ago. The problem has been resolved since onset. The problem is controlled. Associated symptoms include anxiety and malaise/fatigue. Pertinent negatives include no blurred vision, chest pain, headaches, neck pain, orthopnea, palpitations, peripheral edema, PND, shortness of breath or sweats. Risk factors for coronary artery disease include family history, post-menopausal state, stress and sedentary lifestyle. Past treatments include central alpha agonists and diuretics. Current antihypertension treatment includes central alpha agonists and diuretics. The current treatment provides significant improvement. Compliance problems include diet, exercise and psychosocial issues.         The following portions of the patient's history were reviewed and updated as appropriate: allergies, current medications, past family history, past medical history, past social history, past surgical history and problem list.    Review of Systems   Constitutional: Positive for fatigue, irritability and malaise/fatigue. Negative for chills, diaphoresis and fever.   HENT: Negative.  Negative for congestion and sore throat.    Eyes: Negative.  Negative for blurred vision.   Respiratory: Negative.  Negative for cough and shortness of breath.    Cardiovascular: Negative.  Negative for chest pain, palpitations, orthopnea and PND.   Gastrointestinal: Negative.  Negative for abdominal pain, anorexia, change in bowel habit, nausea and vomiting.   Genitourinary: Negative for impotence.   Musculoskeletal: Negative.  Negative for arthralgias, joint swelling, myalgias and neck pain.   Skin: Negative.  Negative for rash.   Allergic/Immunologic: Negative.    Neurological: Positive for memory problem and confusion. Negative for dizziness, vertigo, weakness and  numbness.   Hematological: Negative.    Psychiatric/Behavioral: Positive for agitation, behavioral problems, decreased concentration, dysphoric mood, sleep disturbance, depressed mood and stress. Negative for suicidal ideas. The patient is nervous/anxious. The patient does not have insomnia.        Objective   Physical Exam   Constitutional: She is oriented to person, place, and time. Vital signs are normal. She appears well-developed and well-nourished. No distress.   HENT:   Head: Normocephalic.   Right Ear: External ear normal.   Left Ear: External ear normal.   Nose: Nose normal.   Mouth/Throat: Oropharynx is clear and moist. No oropharyngeal exudate.   Eyes: Pupils are equal, round, and reactive to light. Conjunctivae and EOM are normal. Right eye exhibits no discharge. Left eye exhibits no discharge.   Neck: Normal range of motion. Neck supple. No tracheal deviation present. No thyromegaly present.   Cardiovascular: Normal rate, regular rhythm and normal heart sounds.  Exam reveals no gallop and no friction rub.    No murmur heard.  Pulmonary/Chest: Effort normal and breath sounds normal. No respiratory distress. She has no wheezes. She has no rales. She exhibits no tenderness.   Musculoskeletal: Normal range of motion.   Lymphadenopathy:     She has no cervical adenopathy.   Neurological: She is alert and oriented to person, place, and time.   Skin: Skin is warm and dry. Capillary refill takes less than 2 seconds. No rash noted. She is not diaphoretic. No erythema. No pallor.   Psychiatric: She has a normal mood and affect. Her behavior is normal. Judgment and thought content normal.   Nursing note and vitals reviewed.        Assessment/Plan   Anh was seen today for follow-up and adhd.    Diagnoses and all orders for this visit:    Memory loss  -     Ambulatory Referral to Neurology  -     CBC (No Diff)  -     Comprehensive Metabolic Panel  -     Hemoglobin A1c  -     Iron Profile  -     Vitamin D 25  Hydroxy  -     Vitamin B12  -     TSH  -     Lipid Panel    Bipolar 1 disorder, mixed (CMS/HCC)  -     Ambulatory Referral to Psychology    Attention deficit hyperactivity disorder (ADHD), combined type  -     Ambulatory Referral to Psychology    Anxiety  -     Ambulatory Referral to Psychology    Chronic hepatitis C without hepatic coma (CMS/HCC)    Recurrent major depressive disorder, in partial remission (CMS/HCC)    Screening for diabetes mellitus  -     Hemoglobin A1c    Screening for thyroid disorder  -     TSH    Screening for osteoporosis  -     DEXA Bone Density Axial; Future    Encounter for screening mammogram for breast cancer  -     Mammo Screening Bilateral With CAD; Future    Patient's Body mass index is 23.41 kg/m². BMI is within normal parameters. No follow-up required.  Lab following office visit.  Schedule for screening mammo and DXA scan.  Referral to [facility] for evaluation and treatment of mental health diagnoses.    We will attempt to get her re-scheduled for the speciality appointments that she has missed in the past.  Continue current medications.  Follow up as scheduled for routine follow up.  Follow up sooner for problems/concerns.  Patient verbalized understanding and agreement with plan of care.        This document has been electronically signed by APOLINAR Mooney on September 9, 2018 9:55 PM

## 2018-10-10 ENCOUNTER — OFFICE VISIT (OUTPATIENT)
Dept: OBSTETRICS AND GYNECOLOGY | Facility: CLINIC | Age: 53
End: 2018-10-10

## 2018-10-10 VITALS
BODY MASS INDEX: 23.37 KG/M2 | SYSTOLIC BLOOD PRESSURE: 140 MMHG | HEIGHT: 62 IN | DIASTOLIC BLOOD PRESSURE: 78 MMHG | WEIGHT: 127 LBS

## 2018-10-10 DIAGNOSIS — F17.200 SMOKER: ICD-10-CM

## 2018-10-10 DIAGNOSIS — N95.1 MENOPAUSAL SYNDROME: ICD-10-CM

## 2018-10-10 DIAGNOSIS — F31.60 BIPOLAR 1 DISORDER, MIXED (HCC): ICD-10-CM

## 2018-10-10 DIAGNOSIS — F33.41 RECURRENT MAJOR DEPRESSIVE DISORDER, IN PARTIAL REMISSION (HCC): ICD-10-CM

## 2018-10-10 DIAGNOSIS — F90.2 ATTENTION DEFICIT HYPERACTIVITY DISORDER (ADHD), COMBINED TYPE: Primary | ICD-10-CM

## 2018-10-10 PROCEDURE — 99213 OFFICE O/P EST LOW 20 MIN: CPT | Performed by: OBSTETRICS & GYNECOLOGY

## 2018-10-10 RX ORDER — DEXTROAMPHETAMINE SACCHARATE, AMPHETAMINE ASPARTATE, DEXTROAMPHETAMINE SULFATE AND AMPHETAMINE SULFATE 7.5; 7.5; 7.5; 7.5 MG/1; MG/1; MG/1; MG/1
30 TABLET ORAL
Qty: 60 TABLET | Refills: 0 | Status: SHIPPED | OUTPATIENT
Start: 2018-10-10 | End: 2018-11-14 | Stop reason: SDUPTHER

## 2018-10-10 NOTE — PROGRESS NOTES
Anh Koch   Anh Koch is a 53 y.o. y/o female     Chief Complaint: ADHD, depression, lethargy, menopausal syndrome, smoker    HPI: 53-year-old  with two miscarriages and two vaginal deliveries.  She has had a hysterectomy with BSO.        She smokes a half pack cigarettes per day, but has been able to cut back somewhat.  She has quit in the past, but then stress comes about, and she starts again.        She takes Zoloft 100 mg daily.      She is interested in quitting smoking.  She started Wellbutrin  mg twice daily, and she has cut back a little on her smoking.      She is taking Adderall 30 mg every morning, and she is not having any problems with that.     She is very lethargic.     She takes vitamin D3 and B12 supplementation.     On 2018, we discussed trying to cut back on her Zoloft, and increasing Adderall to 30 mg every morning and noon time.       2018, she is not having any problems with this.     2018, I continued to counseled her to quit smoking.  She states that she is not having any problems with the Adderall.    2018, she lost 2 pounds.  Continue to  her to quit smoking.    2018, she lost 2 pounds.  I am continuing to  her to quit smoking.    2018, her weight remains the same.  She is still smoking the same.  She has been counseled to quit.    2018, she gained 1-1/2 pounds.  Still smoking in spite of being counseled to quit.    October 10, 2018, I continued counseling her to quit smoking.  I refilled her Adderall.  Her weight is stable.    Review of Systems   Constitutional: Positive for fatigue. Negative for activity change, appetite change, chills, diaphoresis, fever and unexpected weight change.   Gastrointestinal: Negative for abdominal pain, constipation, diarrhea and nausea.   Genitourinary: Negative for difficulty urinating, dyspareunia, dysuria, pelvic pain, urgency, vaginal bleeding,  vaginal discharge and vaginal pain.   Neurological: Negative for headaches.   Psychiatric/Behavioral: Positive for dysphoric mood. The patient is nervous/anxious.    All other systems reviewed and are negative.     Breast ROS: negative    The following portions of the patient's history were reviewed and updated as appropriate: allergies, current medications, past family history, past medical history, past social history, past surgical history and problem list.    No Known Allergies       Current Outpatient Prescriptions:   •  amphetamine-dextroamphetamine (ADDERALL) 30 MG tablet, Take 1 tablet by mouth 2 (Two) Times a Day., Disp: 60 tablet, Rfl: 0  •  buPROPion SR (WELLBUTRIN SR) 150 MG 12 hr tablet, Take 1 tablet by mouth 2 (Two) Times a Day., Disp: 60 tablet, Rfl: 12  •  CloNIDine (CATAPRES) 0.1 MG tablet, Take 1 tablet by mouth Every Night., Disp: 30 tablet, Rfl: 5  •  hydrochlorothiazide (HYDRODIURIL) 25 MG tablet, Take 1 tablet by mouth Daily., Disp: 30 tablet, Rfl: 5  •  levocetirizine (XYZAL) 5 MG tablet, Take 1 tablet by mouth Every Evening., Disp: 30 tablet, Rfl: 5     The patient has a family history of   Family History   Problem Relation Age of Onset   • Hypertension Mother    • Diabetes Mother    • Heart attack Father    • Sudden death Father    • Heart disease Father         Past Medical History:   Diagnosis Date   • Abnormal liver function    • Anxiety    • Attention deficit hyperactivity disorder (ADHD), combined type 10/23/2017   • Basal cell carcinoma    • Bipolar disorder (CMS/HCC)    • Chronic hepatitis C (CMS/HCC)     1a. HCV Fibrosure .04/F0, necroinflam .12/A0   • Elevated levels of transaminase & lactic acid dehydrogenase    • Hallux valgus with bunions    • Hepatitis C    • Kidney stone    • Low back pain    • Menopausal and female climacteric states 2017   • Recurrent major depressive disorder, in partial remission (CMS/HCC) 2017   • Smoker 2017        OB History       "Para Term  AB Living    5 2            SAB TAB Ectopic Molar Multiple Live Births                          Social History     Social History   • Marital status: Single     Spouse name: N/A   • Number of children: N/A   • Years of education: N/A     Occupational History   • Not on file.     Social History Main Topics   • Smoking status: Current Every Day Smoker     Packs/day: 1.50     Years: 40.00     Types: Cigarettes     Start date:    • Smokeless tobacco: Never Used   • Alcohol use 1.8 oz/week     1 Glasses of wine, 1 Cans of beer, 1 Shots of liquor per week      Comment: Ocassionally   • Drug use: No   • Sexual activity: Not Currently     Other Topics Concern   • Not on file     Social History Narrative   • No narrative on file        Past Surgical History:   Procedure Laterality Date   • BUNIONECTOMY Right    • COLONOSCOPY  2016    Normal colon.No specimens.   • CYSTOSCOPY W/ URETEROSCOPY W/ LITHOTRIPSY     • PARTIAL HYSTERECTOMY     • TUBAL ABDOMINAL LIGATION          Patient Active Problem List   Diagnosis   • Chronic hepatitis C (CMS/HCC)   • Hallux valgus with bunions   • Anxiety   • Bipolar 1 disorder, mixed (CMS/HCC)   • Unintentional weight loss   • Menopausal and female climacteric states   • Smoker   • Recurrent major depressive disorder, in partial remission (CMS/HCC)   • Attention deficit hyperactivity disorder (ADHD), combined type        Documented Vitals    10/10/18 1308   BP: 140/78   Weight: 57.6 kg (127 lb)   Height: 157.5 cm (62\")   PainSc: 0-No pain       Physical Exam   Constitutional: She is oriented to person, place, and time. She appears well-developed and well-nourished. No distress.    18419.4 pounds with BMI 23.5  Blood pressure 122/60.  Pulse 84.  HENT:   Head: Normocephalic and atraumatic.   Eyes: Conjunctivae and EOM are normal. Pupils are equal, round, and reactive to light.   Neck: Normal range of motion. Neck supple. No JVD present. No tracheal deviation present. " No thyromegaly present.   Cardiovascular: Normal rate, regular rhythm, normal heart sounds and intact distal pulses.  Exam reveals no gallop and no friction rub.    No murmur heard.  Pulmonary/Chest: Effort normal and breath sounds normal. No stridor. No respiratory distress. She has no wheezes. She has no rales. She exhibits no tenderness.   Abdominal: Soft. Bowel sounds are normal. She exhibits no distension and no mass. There is no tenderness. There is no rebound and no guarding. No hernia.   Musculoskeletal: Normal range of motion. She exhibits no edema, tenderness or deformity.   Lymphadenopathy:     She has no cervical adenopathy.   Neurological: She is alert and oriented to person, place, and time. She has normal reflexes. She displays normal reflexes. No cranial nerve deficit. She exhibits normal muscle tone. Coordination normal.   Skin: Skin is warm and dry. No rash noted. She is not diaphoretic. No erythema. No pallor.   Psychiatric: She has a normal mood and affect. Her behavior is normal. Judgment and thought content normal.   Nursing note and vitals reviewed.       Assessment        Diagnosis Plan   1. Attention deficit hyperactivity disorder (ADHD), combined type     2. Menopausal and female climacteric states     3. Bipolar 1 disorder, mixed (CMS/HCC)     4. Smoker     5. Recurrent major depressive disorder, in partial remission (CMS/HCC)           Plan      1. Refilled Adderall 30 mg every morning and every noon.  2. Continue Wellbutrin  mg twice a day.  3. Continue vitamin D3 and B12 supplementation.  4. Encouraged in diet and exercise.   5. Follow-up in 4 weeks.  Follow-up sooner as needed.            This document has been electronically signed by Bharat Sagastume MD on October 10, 2018 1:15 PM        Review of Systems     Physical Exam   Review of Systems   Constitutional: Negative for activity change, appetite change, chills, diaphoresis, fatigue, fever and unexpected weight change.    Gastrointestinal: Negative for abdominal pain, constipation, diarrhea and nausea.   Genitourinary: Negative for difficulty urinating, dyspareunia, dysuria, pelvic pain, urgency, vaginal bleeding, vaginal discharge and vaginal pain.   Neurological: Negative for headaches.   Psychiatric/Behavioral: Negative for dysphoric mood. The patient is not nervous/anxious.    All other systems reviewed and are negative.          Physical Exam   Constitutional: She is oriented to person, place, and time. She appears well-developed and well-nourished. No distress.   127 pounds with BMI 23.2.  A pressure 140/78.  Pulse 72.   HENT:   Head: Normocephalic and atraumatic.   Eyes: Pupils are equal, round, and reactive to light. Conjunctivae and EOM are normal.   Neck: Normal range of motion. Neck supple. No JVD present. No tracheal deviation present. No thyromegaly present.   Cardiovascular: Normal rate, regular rhythm, normal heart sounds and intact distal pulses.  Exam reveals no gallop and no friction rub.    No murmur heard.  Pulmonary/Chest: Effort normal and breath sounds normal. No stridor. No respiratory distress. She has no wheezes. She has no rales. She exhibits no tenderness.   Abdominal: Soft. Bowel sounds are normal. She exhibits no distension and no mass. There is no tenderness. There is no rebound and no guarding. No hernia.   Musculoskeletal: Normal range of motion. She exhibits no edema, tenderness or deformity.   Lymphadenopathy:     She has no cervical adenopathy.   Neurological: She is alert and oriented to person, place, and time. She has normal reflexes. She displays normal reflexes. No cranial nerve deficit. She exhibits normal muscle tone. Coordination normal.   Skin: Skin is warm and dry. No rash noted. She is not diaphoretic. No erythema. No pallor.   Psychiatric: She has a normal mood and affect. Her behavior is normal. Judgment and thought content normal.   Nursing note and vitals reviewed.

## 2018-10-23 ENCOUNTER — OFFICE VISIT (OUTPATIENT)
Dept: GASTROENTEROLOGY | Facility: CLINIC | Age: 53
End: 2018-10-23

## 2018-10-23 VITALS
HEIGHT: 62 IN | SYSTOLIC BLOOD PRESSURE: 105 MMHG | BODY MASS INDEX: 23.37 KG/M2 | HEART RATE: 93 BPM | OXYGEN SATURATION: 98 % | WEIGHT: 127 LBS | DIASTOLIC BLOOD PRESSURE: 80 MMHG

## 2018-10-23 DIAGNOSIS — B18.2 CHRONIC HEPATITIS C WITHOUT HEPATIC COMA (HCC): Primary | ICD-10-CM

## 2018-10-23 DIAGNOSIS — R74.8 ELEVATED LIVER ENZYMES: ICD-10-CM

## 2018-10-23 DIAGNOSIS — K74.00 HEPATIC FIBROSIS: ICD-10-CM

## 2018-10-23 PROCEDURE — 99213 OFFICE O/P EST LOW 20 MIN: CPT | Performed by: PHYSICIAN ASSISTANT

## 2018-10-23 NOTE — PROGRESS NOTES
Chief Complaint   Patient presents with   • Hepatitis C   • Elevated Hepatic Enzymes       ENDO PROCEDURE ORDERED:    Subjective    Anh Koch is a 53 y.o. female. she is here today for follow-up.    History of Present Illness    Patient is seen on a recheck of her chronic active hepatitis C, elevated liver enzymes, and hepatic fibrosis. Last seen 10/24/2017. Genotype 1a/F0. Patient did not keep a followup until today. She currently denies abdominal pain, heartburn, nausea, vomiting, or dysphagia. Bowel movements are regular without blood or mucus. Weight is down 8 pounds since last visit. Last colonoscopy 02/24/2016.     The patient went to [facility] on 12/17/2017. CT scan abdomen/pelvis with contrast showed normal-appearing liver, left adrenal adenoma, left renal calculi, posthysterectomy. CMP showed potassium 3.3, protein 5.4, albumin 3.1, otherwise normal. Normal lipase, INR, and urinalysis.     Laboratory on 12/27/2017 showed normal CMP, urinalysis. CBC showed anemia with hemoglobin 9.8, hematocrit 29.9.     The patient currently denies abdominal pain, heartburn, nausea, or vomiting. Bowels are moving without blood or mucus. Weight is down 8 pounds since last visit.     ASSESSMENT AND PLAN: Patient with chronic active hepatitis C, and most recent CBC showed anemia. The patient has not had any studies in some time. I recommended CBC, CMP, iron studies, AFP, HCV RNA by PCR quantitative, and HCV FibroSure. I discussed with her new treatment options including 8 weeks of Mavyret and we will try to get that approved once we get her studies. Her ultrasound for hepatoma screening will be due in December. We will plan followup after the above, further pending clinical course and the results of the above.       The following portions of the patient's history were reviewed and updated as appropriate:   Past Medical History:   Diagnosis Date   • Abnormal liver function    • Anxiety    • Attention  deficit hyperactivity disorder (ADHD), combined type 10/23/2017   • Basal cell carcinoma    • Bipolar disorder (CMS/HCC)    • Chronic hepatitis C (CMS/HCC)     1a. HCV Fibrosure .04/, necroinflam .12/A0   • Elevated levels of transaminase & lactic acid dehydrogenase    • Hallux valgus with bunions    • Hepatitis C    • Kidney stone    • Low back pain    • Menopausal and female climacteric states 2017   • Recurrent major depressive disorder, in partial remission (CMS/HCC) 2017   • Smoker 2017     Past Surgical History:   Procedure Laterality Date   • BUNIONECTOMY Right    • COLONOSCOPY  2016    Normal colon.No specimens.   • CYSTOSCOPY W/ URETEROSCOPY W/ LITHOTRIPSY     • PARTIAL HYSTERECTOMY     • TUBAL ABDOMINAL LIGATION       Family History   Problem Relation Age of Onset   • Hypertension Mother    • Diabetes Mother    • Heart attack Father    • Sudden death Father    • Heart disease Father      OB History      Para Term  AB Living    5 2            SAB TAB Ectopic Molar Multiple Live Births                       No Known Allergies  Social History     Social History   • Marital status: Single     Social History Main Topics   • Smoking status: Current Every Day Smoker     Packs/day: 1.50     Years: 40.00     Types: Cigarettes     Start date:    • Smokeless tobacco: Never Used   • Alcohol use 1.8 oz/week     1 Glasses of wine, 1 Cans of beer, 1 Shots of liquor per week      Comment: Ocassionally   • Drug use: No   • Sexual activity: Not Currently     Other Topics Concern   • Not on file       Current Outpatient Prescriptions:   •  amphetamine-dextroamphetamine (ADDERALL) 30 MG tablet, Take 1 tablet by mouth 2 (Two) Times a Day., Disp: 60 tablet, Rfl: 0  •  buPROPion SR (WELLBUTRIN SR) 150 MG 12 hr tablet, Take 1 tablet by mouth 2 (Two) Times a Day., Disp: 60 tablet, Rfl: 12  •  CloNIDine (CATAPRES) 0.1 MG tablet, Take 1 tablet by mouth Every Night., Disp: 30 tablet, Rfl: 5  •  " hydrochlorothiazide (HYDRODIURIL) 25 MG tablet, Take 1 tablet by mouth Daily., Disp: 30 tablet, Rfl: 5  •  levocetirizine (XYZAL) 5 MG tablet, Take 1 tablet by mouth Every Evening., Disp: 30 tablet, Rfl: 5  Review of Systems  Review of Systems       Objective    /80   Pulse 93   Ht 157.5 cm (62\")   Wt 57.6 kg (127 lb)   LMP  (LMP Unknown)   SpO2 98%   BMI 23.23 kg/m²   Physical Exam   Constitutional: She is oriented to person, place, and time. She appears well-developed and well-nourished. No distress.   Chronically ill   HENT:   Head: Normocephalic and atraumatic.   Eyes: Pupils are equal, round, and reactive to light. EOM are normal.   Neck: Normal range of motion.   Cardiovascular: Normal rate, regular rhythm and normal heart sounds.    Pulmonary/Chest: Effort normal and breath sounds normal.   Abdominal: Soft. Bowel sounds are normal. She exhibits no shifting dullness, no distension, no abdominal bruit, no ascites and no mass. There is no hepatosplenomegaly. There is no tenderness. There is no rigidity, no rebound, no guarding and no CVA tenderness. No hernia. Hernia confirmed negative in the ventral area.   Musculoskeletal: Normal range of motion.   Neurological: She is alert and oriented to person, place, and time.   Skin: Skin is warm and dry.   Psychiatric: She has a normal mood and affect. Her behavior is normal. Judgment and thought content normal.   Nursing note and vitals reviewed.    Assessment/Plan      1. Chronic hepatitis C without hepatic coma (CMS/HCC)    2. Elevated liver enzymes    3. Hepatic fibrosis    .   Anh was seen today for hepatitis c and elevated hepatic enzymes.    Diagnoses and all orders for this visit:    Chronic hepatitis C without hepatic coma (CMS/HCC)  -     CBC Auto Differential  -     Cancel: Comprehensive Metabolic Panel  -     Cancel: Iron and TIBC  -     Ferritin  -     AFP Tumor Marker  -     HCV RT-PCR,Quant(Non-Graph)  -     HCV FibroSURE    Elevated liver " enzymes  -     CBC Auto Differential  -     Cancel: Comprehensive Metabolic Panel  -     Cancel: Iron and TIBC  -     Ferritin  -     AFP Tumor Marker  -     HCV RT-PCR,Quant(Non-Graph)  -     HCV FibroSURE    Hepatic fibrosis  -     CBC Auto Differential  -     Cancel: Comprehensive Metabolic Panel  -     Cancel: Iron and TIBC  -     Ferritin  -     AFP Tumor Marker  -     HCV RT-PCR,Quant(Non-Graph)  -     HCV FibroSURE        Orders placed during this encounter include:  Orders Placed This Encounter   Procedures   • CBC Auto Differential   • Ferritin   • AFP Tumor Marker   • HCV FibroSURE       Medications prescribed:  No orders of the defined types were placed in this encounter.      Requested Prescriptions      No prescriptions requested or ordered in this encounter       Review and/or summary of lab tests, radiology, procedures, medications. Review and summary of old records and obtaining of history. The risks and benefits of my recommendations, as well as other treatment options were discussed with the patient today. Questions were answered.    Follow-up: Return in about 1 month (around 11/23/2018), or if symptoms worsen or fail to improve.     * Surgery not found *      This document has been electronically signed by Abdulaziz Curtsi PA-C on October 25, 2018 5:41 PM      Results for orders placed or performed in visit on 10/23/18   CBC Auto Differential   Result Value Ref Range    WBC 6.90 3.20 - 9.80 10*3/mm3    RBC 4.44 3.77 - 5.16 10*6/mm3    Hemoglobin 14.1 12.0 - 15.5 g/dL    Hematocrit 41.4 35.0 - 45.0 %    MCV 93.2 80.0 - 98.0 fL    MCH 31.8 26.5 - 34.0 pg    MCHC 34.1 31.4 - 36.0 g/dL    RDW 12.5 11.5 - 14.5 %    RDW-SD 42.3 36.4 - 46.3 fl    MPV 11.2 8.0 - 12.0 fL    Platelets 254 150 - 450 10*3/mm3    Neutrophil % 54.7 37.0 - 80.0 %    Lymphocyte % 34.2 10.0 - 50.0 %    Monocyte % 4.2 0.0 - 12.0 %    Eosinophil % 6.4 0.0 - 7.0 %    Basophil % 0.4 0.0 - 2.0 %    Immature Grans % 0.1 0.0 - 0.5 %     Neutrophils, Absolute 3.77 2.00 - 8.60 10*3/mm3    Lymphocytes, Absolute 2.36 0.60 - 4.20 10*3/mm3    Monocytes, Absolute 0.29 0.00 - 0.90 10*3/mm3    Eosinophils, Absolute 0.44 0.00 - 0.70 10*3/mm3    Basophils, Absolute 0.03 0.00 - 0.20 10*3/mm3    Immature Grans, Absolute 0.01 0.00 - 0.02 10*3/mm3   Ferritin   Result Value Ref Range    Ferritin 41.40 11.10 - 264.00 ng/mL   Results for orders placed or performed in visit on 08/24/18   Iron Profile   Result Value Ref Range    Iron 46 37 - 170 mcg/dL    TIBC 370 265 - 497 mcg/dL    Iron Saturation 12 (L) 15 - 50 %   Vitamin D 25 Hydroxy   Result Value Ref Range    25 Hydroxy, Vitamin D 62.4 30.0 - 100.0 ng/ml   TSH   Result Value Ref Range    TSH 0.620 0.460 - 4.680 mIU/mL   Hemoglobin A1c   Result Value Ref Range    Hemoglobin A1C 5.3 4 - 5.6 %   Vitamin B12   Result Value Ref Range    Vitamin B-12 >1,000 (H) 239 - 931 pg/mL   Lipid Panel   Result Value Ref Range    Total Cholesterol 180 0 - 199 mg/dL    Triglycerides 180 20 - 199 mg/dL    HDL Cholesterol 76 60 - 200 mg/dL    LDL Cholesterol  90 1 - 129 mg/dL    LDL/HDL Ratio 0.89 0.00 - 3.22   Comprehensive Metabolic Panel   Result Value Ref Range    Glucose 110 (H) 60 - 100 mg/dL    BUN 15 7 - 21 mg/dL    Creatinine 0.80 0.50 - 1.00 mg/dL    Sodium 134 (L) 137 - 145 mmol/L    Potassium 3.7 3.5 - 5.1 mmol/L    Chloride 102 95 - 110 mmol/L    CO2 26.0 22.0 - 31.0 mmol/L    Calcium 9.5 8.4 - 10.2 mg/dL    Total Protein 6.7 6.3 - 8.6 g/dL    Albumin 4.00 3.40 - 4.80 g/dL    ALT (SGPT) 32 9 - 52 U/L    AST (SGOT) 27 14 - 36 U/L    Alkaline Phosphatase 120 38 - 126 U/L    Total Bilirubin 0.2 0.2 - 1.3 mg/dL    eGFR Non  Amer 75 51 - 120 mL/min/1.73    Globulin 2.7 2.3 - 3.5 gm/dL    A/G Ratio 1.5 1.1 - 1.8 g/dL    BUN/Creatinine Ratio 18.8 7.0 - 25.0    Anion Gap 6.0 5.0 - 15.0 mmol/L   Results for orders placed or performed in visit on 12/27/17   Urinalysis With / Culture If Indicated - Urine, Clean Catch    Result Value Ref Range    Color, UA Yellow Yellow, Straw, Dark Yellow, Tamela    Appearance, UA Clear Clear    pH, UA 7.0 5.0 - 9.0    Specific [place], UA 1.003 1.003 - 1.030    Glucose, UA Negative Negative    Ketones, UA Negative Negative    Bilirubin, UA Negative Negative    Blood, UA Negative Negative    Protein, UA Negative Negative    Leuk Esterase, UA Negative Negative    Nitrite, UA Negative Negative    Urobilinogen, UA 0.2 E.U./dL 0.2 - 1.0 E.U./dL   CBC (No Diff)   Result Value Ref Range    WBC 7.71 3.20 - 9.80 10*3/mm3    RBC 3.11 (L) 3.77 - 5.16 10*6/mm3    Hemoglobin 9.8 (L) 12.0 - 15.5 g/dL    Hematocrit 29.9 (L) 35.0 - 45.0 %    MCV 96.1 80.0 - 98.0 fL    MCH 31.5 26.5 - 34.0 pg    MCHC 32.8 31.4 - 36.0 g/dL    RDW 14.2 11.5 - 14.5 %    RDW-SD 49.7 (H) 36.4 - 46.3 fl    MPV 10.6 8.0 - 12.0 fL    Platelets 326 150 - 450 10*3/mm3   Comprehensive Metabolic Panel   Result Value Ref Range    Glucose 97 60 - 100 mg/dL    BUN 15 7 - 21 mg/dL    Creatinine 0.68 0.50 - 1.00 mg/dL    Sodium 137 137 - 145 mmol/L    Potassium 4.6 3.5 - 5.1 mmol/L    Chloride 104 95 - 110 mmol/L    CO2 26.0 22.0 - 31.0 mmol/L    Calcium 9.5 8.4 - 10.2 mg/dL    Total Protein 6.4 6.3 - 8.6 g/dL    Albumin 3.90 3.40 - 4.80 g/dL    ALT (SGPT) 43 9 - 52 U/L    AST (SGOT) 26 14 - 36 U/L    Alkaline Phosphatase 109 38 - 126 U/L    Total Bilirubin 0.1 (L) 0.2 - 1.3 mg/dL    eGFR Non  Amer 91 51 - 120 mL/min/1.73    Globulin 2.5 2.3 - 3.5 gm/dL    A/G Ratio 1.6 1.1 - 1.8 g/dL    BUN/Creatinine Ratio 22.1 7.0 - 25.0    Anion Gap 7.0 5.0 - 15.0 mmol/L   Results for orders placed or performed in visit on 09/12/17   HCV RT-PCR,Quant(Non-Graph)   Result Value Ref Range    Hepatitis C Quantitation [id number] IU/mL    HCV log10 6.246 log10 IU/mL    Test Information Comment      *Note: Due to a large number of results and/or encounters for the requested time period, some results have not been displayed. A complete set of results can be found  in Results Review.       Some portions of this note have been dictated using voice recognition software and may contain errors and/or omissions.

## 2018-10-23 NOTE — PATIENT INSTRUCTIONS

## 2018-10-24 ENCOUNTER — APPOINTMENT (OUTPATIENT)
Dept: LAB | Facility: HOSPITAL | Age: 53
End: 2018-10-24

## 2018-10-24 PROCEDURE — 80053 COMPREHEN METABOLIC PANEL: CPT | Performed by: NURSE PRACTITIONER

## 2018-10-24 PROCEDURE — 83883 ASSAY NEPHELOMETRY NOT SPEC: CPT | Performed by: NURSE PRACTITIONER

## 2018-10-24 PROCEDURE — 83036 HEMOGLOBIN GLYCOSYLATED A1C: CPT | Performed by: NURSE PRACTITIONER

## 2018-10-24 PROCEDURE — 82306 VITAMIN D 25 HYDROXY: CPT | Performed by: NURSE PRACTITIONER

## 2018-10-24 PROCEDURE — 82607 VITAMIN B-12: CPT | Performed by: NURSE PRACTITIONER

## 2018-10-24 PROCEDURE — 83550 IRON BINDING TEST: CPT | Performed by: NURSE PRACTITIONER

## 2018-10-24 PROCEDURE — 82728 ASSAY OF FERRITIN: CPT | Performed by: NURSE PRACTITIONER

## 2018-10-24 PROCEDURE — 82977 ASSAY OF GGT: CPT | Performed by: NURSE PRACTITIONER

## 2018-10-24 PROCEDURE — 87522 HEPATITIS C REVRS TRNSCRPJ: CPT | Performed by: PHYSICIAN ASSISTANT

## 2018-10-24 PROCEDURE — 82105 ALPHA-FETOPROTEIN SERUM: CPT | Performed by: PHYSICIAN ASSISTANT

## 2018-10-24 PROCEDURE — 80061 LIPID PANEL: CPT | Performed by: NURSE PRACTITIONER

## 2018-10-24 PROCEDURE — 83010 ASSAY OF HAPTOGLOBIN QUANT: CPT | Performed by: NURSE PRACTITIONER

## 2018-10-24 PROCEDURE — 85025 COMPLETE CBC W/AUTO DIFF WBC: CPT | Performed by: PHYSICIAN ASSISTANT

## 2018-10-24 PROCEDURE — 84443 ASSAY THYROID STIM HORMONE: CPT | Performed by: NURSE PRACTITIONER

## 2018-10-24 PROCEDURE — 83540 ASSAY OF IRON: CPT | Performed by: NURSE PRACTITIONER

## 2018-10-24 PROCEDURE — 82172 ASSAY OF APOLIPOPROTEIN: CPT | Performed by: NURSE PRACTITIONER

## 2018-10-25 LAB
25(OH)D3 SERPL-MCNC: 62.4 NG/ML (ref 30–100)
ALBUMIN SERPL-MCNC: 4 G/DL (ref 3.4–4.8)
ALBUMIN/GLOB SERPL: 1.5 G/DL (ref 1.1–1.8)
ALP SERPL-CCNC: 120 U/L (ref 38–126)
ALT SERPL W P-5'-P-CCNC: 32 U/L (ref 9–52)
ANION GAP SERPL CALCULATED.3IONS-SCNC: 6 MMOL/L (ref 5–15)
ARTICHOKE IGE QN: 90 MG/DL (ref 1–129)
AST SERPL-CCNC: 27 U/L (ref 14–36)
BASOPHILS # BLD AUTO: 0.03 10*3/MM3 (ref 0–0.2)
BASOPHILS NFR BLD AUTO: 0.4 % (ref 0–2)
BILIRUB SERPL-MCNC: 0.2 MG/DL (ref 0.2–1.3)
BUN BLD-MCNC: 15 MG/DL (ref 7–21)
BUN/CREAT SERPL: 18.8 (ref 7–25)
CALCIUM SPEC-SCNC: 9.5 MG/DL (ref 8.4–10.2)
CHLORIDE SERPL-SCNC: 102 MMOL/L (ref 95–110)
CHOLEST SERPL-MCNC: 180 MG/DL (ref 0–199)
CO2 SERPL-SCNC: 26 MMOL/L (ref 22–31)
CREAT BLD-MCNC: 0.8 MG/DL (ref 0.5–1)
DEPRECATED RDW RBC AUTO: 42.3 FL (ref 36.4–46.3)
EOSINOPHIL # BLD AUTO: 0.44 10*3/MM3 (ref 0–0.7)
EOSINOPHIL NFR BLD AUTO: 6.4 % (ref 0–7)
ERYTHROCYTE [DISTWIDTH] IN BLOOD BY AUTOMATED COUNT: 12.5 % (ref 11.5–14.5)
FERRITIN SERPL-MCNC: 41.4 NG/ML (ref 11.1–264)
GFR SERPL CREATININE-BSD FRML MDRD: 75 ML/MIN/1.73 (ref 51–120)
GLOBULIN UR ELPH-MCNC: 2.7 GM/DL (ref 2.3–3.5)
GLUCOSE BLD-MCNC: 110 MG/DL (ref 60–100)
HBA1C MFR BLD: 5.3 % (ref 4–5.6)
HCT VFR BLD AUTO: 41.4 % (ref 35–45)
HDLC SERPL-MCNC: 76 MG/DL (ref 60–200)
HGB BLD-MCNC: 14.1 G/DL (ref 12–15.5)
IMM GRANULOCYTES # BLD: 0.01 10*3/MM3 (ref 0–0.02)
IMM GRANULOCYTES NFR BLD: 0.1 % (ref 0–0.5)
IRON 24H UR-MRATE: 46 MCG/DL (ref 37–170)
IRON SATN MFR SERPL: 12 % (ref 15–50)
LDLC/HDLC SERPL: 0.89 {RATIO} (ref 0–3.22)
LYMPHOCYTES # BLD AUTO: 2.36 10*3/MM3 (ref 0.6–4.2)
LYMPHOCYTES NFR BLD AUTO: 34.2 % (ref 10–50)
MCH RBC QN AUTO: 31.8 PG (ref 26.5–34)
MCHC RBC AUTO-ENTMCNC: 34.1 G/DL (ref 31.4–36)
MCV RBC AUTO: 93.2 FL (ref 80–98)
MONOCYTES # BLD AUTO: 0.29 10*3/MM3 (ref 0–0.9)
MONOCYTES NFR BLD AUTO: 4.2 % (ref 0–12)
NEUTROPHILS # BLD AUTO: 3.77 10*3/MM3 (ref 2–8.6)
NEUTROPHILS NFR BLD AUTO: 54.7 % (ref 37–80)
PLATELET # BLD AUTO: 254 10*3/MM3 (ref 150–450)
PMV BLD AUTO: 11.2 FL (ref 8–12)
POTASSIUM BLD-SCNC: 3.7 MMOL/L (ref 3.5–5.1)
PROT SERPL-MCNC: 6.7 G/DL (ref 6.3–8.6)
RBC # BLD AUTO: 4.44 10*6/MM3 (ref 3.77–5.16)
SODIUM BLD-SCNC: 134 MMOL/L (ref 137–145)
TIBC SERPL-MCNC: 370 MCG/DL (ref 265–497)
TRIGL SERPL-MCNC: 180 MG/DL (ref 20–199)
TSH SERPL DL<=0.05 MIU/L-ACNC: 0.62 MIU/ML (ref 0.46–4.68)
VIT B12 BLD-MCNC: >1000 PG/ML (ref 239–931)
WBC NRBC COR # BLD: 6.9 10*3/MM3 (ref 3.2–9.8)

## 2018-10-26 LAB
AFP-TM SERPL-MCNC: 2 NG/ML (ref 0–8.3)
HCV RNA SERPL NAA+PROBE-ACNC: NORMAL IU/ML
HCV RNA SERPL NAA+PROBE-LOG IU: 6.39 LOG10 IU/ML
TEST INFORMATION: NORMAL

## 2018-10-27 LAB
A2 MACROGLOB SERPL-MCNC: 231 MG/DL (ref 110–276)
ALT SERPL W P-5'-P-CCNC: 27 IU/L (ref 0–40)
APO A-I SERPL-MCNC: 203 MG/DL (ref 116–209)
BILIRUB SERPL-MCNC: 0.2 MG/DL (ref 0–1.2)
FIBROSIS SCORING:: NORMAL
FIBROSIS STAGE SERPL QL: NORMAL
GGT SERPL-CCNC: 20 IU/L (ref 0–60)
HAPTOGLOB SERPL-MCNC: 94 MG/DL (ref 34–200)
HCV AB SER QL: NORMAL
LABORATORY COMMENT REPORT: NORMAL
LIMITATIONS:: NORMAL
LIVER FIBR SCORE SERPL CALC.FIBROSURE: 0.07 (ref 0–0.21)
NECROINFLAMM ACTIVITY SCORING:: NORMAL
NECROINFLAMMATORY ACT GRADE SERPL QL: NORMAL
NECROINFLAMMATORY ACT SCORE SERPL: 0.09 (ref 0–0.17)

## 2018-11-02 ENCOUNTER — TELEPHONE (OUTPATIENT)
Dept: FAMILY MEDICINE CLINIC | Facility: CLINIC | Age: 53
End: 2018-11-02

## 2018-11-13 ENCOUNTER — TELEPHONE (OUTPATIENT)
Dept: OBSTETRICS AND GYNECOLOGY | Facility: CLINIC | Age: 53
End: 2018-11-13

## 2018-11-13 NOTE — TELEPHONE ENCOUNTER
----- Message from Zayda Horvath sent at 11/13/2018 10:16 AM CST -----  Contact: [phone]  Pt is wanting to know if a referral can be sent over to the [facility] for a family doctor.   Also wanting to know if she can get a refill on adderral for this month until she can get an appointment with family doctor.  [facility]      Per Dr. Sagastume, this pt has been put back on schedule for Wednesday in [place] at 2:15.  The pt is aware of this appt.

## 2018-11-14 ENCOUNTER — OFFICE VISIT (OUTPATIENT)
Dept: OBSTETRICS AND GYNECOLOGY | Facility: CLINIC | Age: 53
End: 2018-11-14

## 2018-11-14 VITALS
HEIGHT: 62 IN | WEIGHT: 128 LBS | HEART RATE: 88 BPM | SYSTOLIC BLOOD PRESSURE: 122 MMHG | DIASTOLIC BLOOD PRESSURE: 70 MMHG | BODY MASS INDEX: 23.55 KG/M2

## 2018-11-14 DIAGNOSIS — F31.60 BIPOLAR 1 DISORDER, MIXED (HCC): ICD-10-CM

## 2018-11-14 DIAGNOSIS — N95.1 MENOPAUSAL SYNDROME: ICD-10-CM

## 2018-11-14 DIAGNOSIS — F17.200 SMOKER: ICD-10-CM

## 2018-11-14 DIAGNOSIS — F90.2 ATTENTION DEFICIT HYPERACTIVITY DISORDER (ADHD), COMBINED TYPE: Primary | ICD-10-CM

## 2018-11-14 DIAGNOSIS — F33.41 RECURRENT MAJOR DEPRESSIVE DISORDER, IN PARTIAL REMISSION (HCC): ICD-10-CM

## 2018-11-14 PROCEDURE — 99213 OFFICE O/P EST LOW 20 MIN: CPT | Performed by: OBSTETRICS & GYNECOLOGY

## 2018-11-14 RX ORDER — BUPROPION HYDROCHLORIDE 150 MG/1
150 TABLET, EXTENDED RELEASE ORAL 2 TIMES DAILY
Qty: 60 TABLET | Refills: 12 | Status: SHIPPED | OUTPATIENT
Start: 2018-11-14 | End: 2018-12-12 | Stop reason: SDUPTHER

## 2018-11-14 RX ORDER — DEXTROAMPHETAMINE SACCHARATE, AMPHETAMINE ASPARTATE, DEXTROAMPHETAMINE SULFATE AND AMPHETAMINE SULFATE 7.5; 7.5; 7.5; 7.5 MG/1; MG/1; MG/1; MG/1
30 TABLET ORAL
Qty: 60 TABLET | Refills: 0 | Status: SHIPPED | OUTPATIENT
Start: 2018-11-14 | End: 2018-12-12 | Stop reason: SDUPTHER

## 2018-11-14 NOTE — PROGRESS NOTES
Anh Koch is a 53 y.o. y/o female     Chief Complaint: ADHD, depression, lethargy, menopausal syndrome, smoker    HPI: 53-year-old  with two miscarriages and two vaginal deliveries.  She has had a hysterectomy with BSO.        She smokes a half pack cigarettes per day, but has been able to cut back somewhat.  She has quit in the past, but then stress comes about, and she starts again.        She takes Zoloft 100 mg daily.      She is interested in quitting smoking.  She started Wellbutrin  mg twice daily, and she has cut back a little on her smoking.      She is taking Adderall 30 mg every morning, and she is not having any problems with that.     She is very lethargic.     She takes vitamin D3 and B12 supplementation.     On 2018, we discussed trying to cut back on her Zoloft, and increasing Adderall to 30 mg every morning and noon time.       2018, she is not having any problems with this.     2018, I continued to counseled her to quit smoking.  She states that she is not having any problems with the Adderall.    2018, she lost 2 pounds.  Continue to  her to quit smoking.    2018, she lost 2 pounds.  I am continuing to  her to quit smoking.    2018, her weight remains the same.  She is still smoking the same.  She has been counseled to quit.    2018, she gained 1-1/2 pounds.  Still smoking in spite of being counseled to quit.    October 10, 2018, I continued counseling her to quit smoking.  I refilled her Adderall.  Her weight is stable.     Review of Systems   Constitutional: Negative for activity change, appetite change, chills, diaphoresis, fatigue, fever and unexpected weight change.   Gastrointestinal: Negative for abdominal pain, constipation, diarrhea and nausea.   Genitourinary: Negative for difficulty urinating, dyspareunia, dysuria, pelvic pain, urgency, vaginal bleeding, vaginal discharge and  vaginal pain.   Neurological: Negative for headaches.   Psychiatric/Behavioral: Negative for dysphoric mood. The patient is not nervous/anxious.    All other systems reviewed and are negative.          Physical Exam   Constitutional: She is oriented to person, place, and time. She appears well-developed and well-nourished. No distress.   128 pounds with BMI 23.4.  Blood pressure 122/70.  Pulse 88.   HENT:   Head: Normocephalic and atraumatic.   Eyes: Conjunctivae and EOM are normal. Pupils are equal, round, and reactive to light.   Neck: Normal range of motion. Neck supple. No JVD present. No tracheal deviation present. No thyromegaly present.   Cardiovascular: Normal rate, regular rhythm, normal heart sounds and intact distal pulses. Exam reveals no gallop and no friction rub.   No murmur heard.  Pulmonary/Chest: Effort normal and breath sounds normal. No stridor. No respiratory distress. She has no wheezes. She has no rales. She exhibits no tenderness.   Abdominal: Soft. Bowel sounds are normal. She exhibits no distension and no mass. There is no tenderness. There is no rebound and no guarding. No hernia.   Musculoskeletal: Normal range of motion. She exhibits no edema, tenderness or deformity.   Lymphadenopathy:     She has no cervical adenopathy.   Neurological: She is alert and oriented to person, place, and time. She has normal reflexes. She displays normal reflexes. No cranial nerve deficit. She exhibits normal muscle tone. Coordination normal.   Skin: Skin is warm and dry. No rash noted. She is not diaphoretic. No erythema. No pallor.   Psychiatric: She has a normal mood and affect. Her behavior is normal. Judgment and thought content normal.   Nursing note and vitals reviewed.      Breast ROS: negative    The following portions of the patient's history were reviewed and updated as appropriate: allergies, current medications, past family history, past medical history, past social history, past surgical  history and problem list.    No Known Allergies       Current Outpatient Medications:   •  amphetamine-dextroamphetamine (ADDERALL) 30 MG tablet, Take 1 tablet by mouth 2 (Two) Times a Day., Disp: 60 tablet, Rfl: 0  •  buPROPion SR (WELLBUTRIN SR) 150 MG 12 hr tablet, Take 1 tablet by mouth 2 (Two) Times a Day., Disp: 60 tablet, Rfl: 12  •  CloNIDine (CATAPRES) 0.1 MG tablet, Take 1 tablet by mouth Every Night., Disp: 30 tablet, Rfl: 5  •  hydrochlorothiazide (HYDRODIURIL) 25 MG tablet, Take 1 tablet by mouth Daily., Disp: 30 tablet, Rfl: 5  •  levocetirizine (XYZAL) 5 MG tablet, Take 1 tablet by mouth Every Evening., Disp: 30 tablet, Rfl: 5     The patient has a family history of   Family History   Problem Relation Age of Onset   • Hypertension Mother    • Diabetes Mother    • Heart attack Father    • Sudden death Father    • Heart disease Father         Past Medical History:   Diagnosis Date   • Abnormal liver function    • Anxiety    • Attention deficit hyperactivity disorder (ADHD), combined type 10/23/2017   • Basal cell carcinoma    • Bipolar disorder (CMS/HCC)    • Chronic hepatitis C (CMS/HCC)     1a. HCV Fibrosure .04/F0, necroinflam .12/A0   • Elevated levels of transaminase & lactic acid dehydrogenase    • Hallux valgus with bunions    • Hepatitis C    • Kidney stone    • Low back pain    • Menopausal and female climacteric states 2017   • Recurrent major depressive disorder, in partial remission (CMS/HCC) 2017   • Smoker 2017        OB History      Para Term  AB Living    5 2            SAB TAB Ectopic Molar Multiple Live Births                          Social History     Socioeconomic History   • Marital status: Single     Spouse name: Not on file   • Number of children: Not on file   • Years of education: Not on file   • Highest education level: Not on file   Social Needs   • Financial resource strain: Not on file   • Food insecurity - worry: Not on file   • Food insecurity  "- inability: Not on file   • Transportation needs - medical: Not on file   • Transportation needs - non-medical: Not on file   Occupational History   • Not on file   Tobacco Use   • Smoking status: Current Every Day Smoker     Packs/day: 1.50     Years: 40.00     Pack years: 60.00     Types: Cigarettes     Start date: 1981   • Smokeless tobacco: Never Used   Substance and Sexual Activity   • Alcohol use: Yes     Alcohol/week: 1.8 oz     Types: 1 Glasses of wine, 1 Cans of beer, 1 Shots of liquor per week     Comment: Ocassionally   • Drug use: No   • Sexual activity: Not Currently   Other Topics Concern   • Not on file   Social History Narrative   • Not on file        Past Surgical History:   Procedure Laterality Date   • BUNIONECTOMY Right    • COLONOSCOPY  02/24/2016    Normal colon.No specimens.   • CYSTOSCOPY W/ URETEROSCOPY W/ LITHOTRIPSY     • PARTIAL HYSTERECTOMY     • TUBAL ABDOMINAL LIGATION          Patient Active Problem List   Diagnosis   • Chronic hepatitis C (CMS/HCC)   • Hallux valgus with bunions   • Anxiety   • Bipolar 1 disorder, mixed (CMS/HCC)   • Unintentional weight loss   • Menopausal and female climacteric states   • Smoker   • Recurrent major depressive disorder, in partial remission (CMS/HCC)   • Attention deficit hyperactivity disorder (ADHD), combined type        Documented Vitals    11/14/18 1106   BP: 122/70   Pulse: 88   Weight: 58.1 kg (128 lb)   Height: 157.5 cm (62\")   PainSc: 0-No pain            Assessment        Diagnosis Plan   1. Attention deficit hyperactivity disorder (ADHD), combined type     2. Menopausal and female climacteric states     3. Bipolar 1 disorder, mixed (CMS/HCC)     4. Smoker     5. Recurrent major depressive disorder, in partial remission (CMS/HCC)           Plan      1. Refilled Adderall 30 mg every morning and every noon.  2. Refilled Wellbutrin  mg twice a day.  3. Continue vitamin D3 and B12 supplementation.  4. Encouraged in diet and exercise. "   5. Follow-up in 4 weeks.  Follow-up sooner as needed.            This document has been electronically signed by Bharat Sagastume MD on November 14, 2018 12:17 PM

## 2018-11-20 ENCOUNTER — OFFICE VISIT (OUTPATIENT)
Dept: GASTROENTEROLOGY | Facility: CLINIC | Age: 53
End: 2018-11-20

## 2018-11-20 VITALS
DIASTOLIC BLOOD PRESSURE: 72 MMHG | WEIGHT: 130 LBS | BODY MASS INDEX: 23.92 KG/M2 | HEART RATE: 86 BPM | HEIGHT: 62 IN | SYSTOLIC BLOOD PRESSURE: 136 MMHG

## 2018-11-20 DIAGNOSIS — B18.2 CHRONIC HEPATITIS C WITHOUT HEPATIC COMA (HCC): Primary | ICD-10-CM

## 2018-11-20 DIAGNOSIS — K74.00 HEPATIC FIBROSIS: ICD-10-CM

## 2018-11-20 DIAGNOSIS — R74.8 ELEVATED LIVER ENZYMES: ICD-10-CM

## 2018-11-20 PROCEDURE — 99214 OFFICE O/P EST MOD 30 MIN: CPT | Performed by: PHYSICIAN ASSISTANT

## 2018-11-20 NOTE — PROGRESS NOTES
Chief Complaint   Patient presents with   • Hepatitis C   • Hepatic Fibrosis   • Elevated Hepatic Enzymes       ENDO PROCEDURE ORDERED:    Subjective    Anh Koch is a 53 y.o. female. she is here today for follow-up.    History of Present Illness    SUBJECT:  Patient was seen on a recheck of her chronic active hepatitis C, hepatic fibrosis, elevated liver enzymes.  Last seen 10/23/2018.  Genotype 1A/F0.  She is treatment naive.  She currently denies abdominal pain, heartburn, nausea, vomiting, dysphagia.  Bowel movements are regular without blood or mucus.  Weight is up 3 pounds since last visit.  Last colonoscopy 02/24/2016.  She had a previous hepatitis diagnostic panel on 11/10/2015 that showed hepatitis C antibody positive only, the rest of the panel was negative.    Laboratories on 10/24/2018:  HCV Fibrosure 0.07/F0, 0.09/A0.  AFP was normal.  Iron studies showed 12% saturation, normal CBC, cholesterol, TSH.  B12 was greater than 1000.  Vitamin D normal.  A1c 5.3%.  CMP showed glucose 110, sodium 134, otherwise normal.  HCV RNA by PCR quantitative was 2,480,000 international units per mL, equivalent to 6.394 log international units.    ASSESSMENT/PLAN:  Patient with chronic active hepatitis C with elevated liver enzymes secondary to the above.  I suggested 8 weeks treatment of Harvoni, risks, benefits, alternatives are discussed with the patient, the importance of taking the medications regularly, potential side effects, drug interactions.  She agrees to abstain from drugs and alcohol.  She agrees to be compliant with treatment protocol.  She will be due for hepatoma screening in December.  We will see her back at that time, further pending clinical course and the results of the above.       The following portions of the patient's history were reviewed and updated as appropriate:   Past Medical History:   Diagnosis Date   • Abnormal liver function    • Anxiety    • Attention deficit hyperactivity disorder  (ADHD), combined type 10/23/2017   • Basal cell carcinoma    • Bipolar disorder (CMS/HCC)    • Chronic hepatitis C (CMS/HCC)     1a. HCV Fibrosure .04/F0, necroinflam .12/A0   • Elevated levels of transaminase & lactic acid dehydrogenase    • Hallux valgus with bunions    • Hepatitis C    • Kidney stone    • Low back pain    • Menopausal and female climacteric states 2017   • Recurrent major depressive disorder, in partial remission (CMS/HCC) 2017   • Smoker 2017     Past Surgical History:   Procedure Laterality Date   • BUNIONECTOMY Right    • COLONOSCOPY  2016    Normal colon.No specimens.   • CYSTOSCOPY W/ URETEROSCOPY W/ LITHOTRIPSY     • PARTIAL HYSTERECTOMY     • TUBAL ABDOMINAL LIGATION       Family History   Problem Relation Age of Onset   • Hypertension Mother    • Diabetes Mother    • Heart attack Father    • Sudden death Father    • Heart disease Father      OB History      Para Term  AB Living    5 2            SAB TAB Ectopic Molar Multiple Live Births                       No Known Allergies  Social History     Socioeconomic History   • Marital status: Single     Spouse name: Not on file   • Number of children: Not on file   • Years of education: Not on file   • Highest education level: Not on file   Tobacco Use   • Smoking status: Current Every Day Smoker     Packs/day: 0.50     Years: 40.00     Pack years: 20.00     Types: Cigarettes     Start date:    • Smokeless tobacco: Never Used   Substance and Sexual Activity   • Alcohol use: Yes     Alcohol/week: 1.8 oz     Types: 1 Glasses of wine, 1 Cans of beer, 1 Shots of liquor per week     Frequency: 2-4 times a month     Comment: Ocassionally   • Drug use: No   • Sexual activity: Not Currently       Current Outpatient Medications:   •  amphetamine-dextroamphetamine (ADDERALL) 30 MG tablet, Take 1 tablet by mouth 2 (Two) Times a Day., Disp: 60 tablet, Rfl: 0  •  buPROPion SR (WELLBUTRIN SR) 150 MG 12 hr tablet, Take  "1 tablet by mouth 2 (Two) Times a Day., Disp: 60 tablet, Rfl: 12  •  CloNIDine (CATAPRES) 0.1 MG tablet, Take 1 tablet by mouth Every Night., Disp: 30 tablet, Rfl: 5  •  hydrochlorothiazide (HYDRODIURIL) 25 MG tablet, Take 1 tablet by mouth Daily., Disp: 30 tablet, Rfl: 5  •  levocetirizine (XYZAL) 5 MG tablet, Take 1 tablet by mouth Every Evening., Disp: 30 tablet, Rfl: 5  •  Glecaprevir-Pibrentasvir (MAVYRET) 100-40 MG tablet, Take 3 tablets by mouth Daily., Disp: 84 tablet, Rfl: 1  Review of Systems  Review of Systems       Objective    /72 (BP Location: Left arm)   Pulse 86   Ht 157.5 cm (62\")   Wt 59 kg (130 lb)   LMP  (LMP Unknown)   BMI 23.78 kg/m²   Physical Exam   Constitutional: She is oriented to person, place, and time. She appears well-developed and well-nourished. No distress.   Chronically ill   HENT:   Head: Normocephalic and atraumatic.   Eyes: EOM are normal. Pupils are equal, round, and reactive to light.   Neck: Normal range of motion.   Cardiovascular: Normal rate, regular rhythm and normal heart sounds.   Pulmonary/Chest: Effort normal and breath sounds normal.   Abdominal: Soft. Bowel sounds are normal. She exhibits no shifting dullness, no distension, no abdominal bruit, no ascites and no mass. There is no hepatosplenomegaly. There is no tenderness. There is no rigidity, no rebound, no guarding and no CVA tenderness. No hernia. Hernia confirmed negative in the ventral area.   Musculoskeletal: Normal range of motion.   Neurological: She is alert and oriented to person, place, and time.   Skin: Skin is warm and dry.   Psychiatric: She has a normal mood and affect. Her behavior is normal. Judgment and thought content normal.   Nursing note and vitals reviewed.    Assessment/Plan      1. Chronic hepatitis C without hepatic coma (CMS/HCC)    2. Elevated liver enzymes    3. Hepatic fibrosis    .   Anh was seen today for hepatitis c, hepatic fibrosis and elevated hepatic " enzymes.    Diagnoses and all orders for this visit:    Chronic hepatitis C without hepatic coma (CMS/HCC)  Comments:  F0/A0    Elevated liver enzymes    Hepatic fibrosis    Other orders  -     Glecaprevir-Pibrentasvir (MAVYRET) 100-40 MG tablet; Take 3 tablets by mouth Daily.        Orders placed during this encounter include:  No orders of the defined types were placed in this encounter.      Medications prescribed:  New Medications Ordered This Visit   Medications   • Glecaprevir-Pibrentasvir (MAVYRET) 100-40 MG tablet     Sig: Take 3 tablets by mouth Daily.     Dispense:  84 tablet     Refill:  1       Requested Prescriptions     Signed Prescriptions Disp Refills   • Glecaprevir-Pibrentasvir (MAVYRET) 100-40 MG tablet 84 tablet 1     Sig: Take 3 tablets by mouth Daily.       Review and/or summary of lab tests, radiology, procedures, medications. Review and summary of old records and obtaining of history. The risks and benefits of my recommendations, as well as other treatment options were discussed with the patient today. Questions were answered.    Follow-up: Return in about 1 month (around 12/20/2018), or if symptoms worsen or fail to improve.     * Surgery not found *      This document has been electronically signed by Abdulaziz Curtis PA-C on November 26, 2018 10:58 AM      Results for orders placed or performed in visit on 10/23/18   HCV RT-PCR,Quant(Non-Graph)   Result Value Ref Range    Hepatitis C Quantitation [id number] IU/mL    HCV log10 6.394 log10 IU/mL    Test Information Comment    HCV FibroSURE   Result Value Ref Range    Fibrosis Score 0.07 0.00 - 0.21    Fibrosis Stage Comment     Necroinflammat Activity Score 0.09 0.00 - 0.17    Necroinflammat Activity Grade A0-No activity     Alpha 2-Macroglobulins, Qn 231 110 - 276 mg/dL    Haptoglobin 94 34 - 200 mg/dL    Apolipoprotein A-1 203 116 - 209 mg/dL    Total Bilirubin 0.2 0.0 - 1.2 mg/dL    GGT 20 0 - 60 IU/L    ALT (SGPT) 27 0 - 40 IU/L    HCV  Qual Interp Comment     Fibrosis Scoring: Comment     Necroinflamm Activity Scoring: Comment     Limitations: Comment     Comment Comment    CBC Auto Differential   Result Value Ref Range    WBC 6.90 3.20 - 9.80 10*3/mm3    RBC 4.44 3.77 - 5.16 10*6/mm3    Hemoglobin 14.1 12.0 - 15.5 g/dL    Hematocrit 41.4 35.0 - 45.0 %    MCV 93.2 80.0 - 98.0 fL    MCH 31.8 26.5 - 34.0 pg    MCHC 34.1 31.4 - 36.0 g/dL    RDW 12.5 11.5 - 14.5 %    RDW-SD 42.3 36.4 - 46.3 fl    MPV 11.2 8.0 - 12.0 fL    Platelets 254 150 - 450 10*3/mm3    Neutrophil % 54.7 37.0 - 80.0 %    Lymphocyte % 34.2 10.0 - 50.0 %    Monocyte % 4.2 0.0 - 12.0 %    Eosinophil % 6.4 0.0 - 7.0 %    Basophil % 0.4 0.0 - 2.0 %    Immature Grans % 0.1 0.0 - 0.5 %    Neutrophils, Absolute 3.77 2.00 - 8.60 10*3/mm3    Lymphocytes, Absolute 2.36 0.60 - 4.20 10*3/mm3    Monocytes, Absolute 0.29 0.00 - 0.90 10*3/mm3    Eosinophils, Absolute 0.44 0.00 - 0.70 10*3/mm3    Basophils, Absolute 0.03 0.00 - 0.20 10*3/mm3    Immature Grans, Absolute 0.01 0.00 - 0.02 10*3/mm3   AFP Tumor Marker   Result Value Ref Range    AFP Tumor Marker 2.0 0.0 - 8.3 ng/mL   Ferritin   Result Value Ref Range    Ferritin 41.40 11.10 - 264.00 ng/mL   Results for orders placed or performed in visit on 08/24/18   Iron Profile   Result Value Ref Range    Iron 46 37 - 170 mcg/dL    TIBC 370 265 - 497 mcg/dL    Iron Saturation 12 (L) 15 - 50 %   Vitamin D 25 Hydroxy   Result Value Ref Range    25 Hydroxy, Vitamin D 62.4 30.0 - 100.0 ng/ml   TSH   Result Value Ref Range    TSH 0.620 0.460 - 4.680 mIU/mL   Hemoglobin A1c   Result Value Ref Range    Hemoglobin A1C 5.3 4 - 5.6 %   Vitamin B12   Result Value Ref Range    Vitamin B-12 >1,000 (H) 239 - 931 pg/mL   Lipid Panel   Result Value Ref Range    Total Cholesterol 180 0 - 199 mg/dL    Triglycerides 180 20 - 199 mg/dL    HDL Cholesterol 76 60 - 200 mg/dL    LDL Cholesterol  90 1 - 129 mg/dL    LDL/HDL Ratio 0.89 0.00 - 3.22   Comprehensive Metabolic  Panel   Result Value Ref Range    Glucose 110 (H) 60 - 100 mg/dL    BUN 15 7 - 21 mg/dL    Creatinine 0.80 0.50 - 1.00 mg/dL    Sodium 134 (L) 137 - 145 mmol/L    Potassium 3.7 3.5 - 5.1 mmol/L    Chloride 102 95 - 110 mmol/L    CO2 26.0 22.0 - 31.0 mmol/L    Calcium 9.5 8.4 - 10.2 mg/dL    Total Protein 6.7 6.3 - 8.6 g/dL    Albumin 4.00 3.40 - 4.80 g/dL    ALT (SGPT) 32 9 - 52 U/L    AST (SGOT) 27 14 - 36 U/L    Alkaline Phosphatase 120 38 - 126 U/L    Total Bilirubin 0.2 0.2 - 1.3 mg/dL    eGFR Non  Amer 75 51 - 120 mL/min/1.73    Globulin 2.7 2.3 - 3.5 gm/dL    A/G Ratio 1.5 1.1 - 1.8 g/dL    BUN/Creatinine Ratio 18.8 7.0 - 25.0    Anion Gap 6.0 5.0 - 15.0 mmol/L   Results for orders placed or performed in visit on 12/27/17   Urinalysis With / Culture If Indicated - Urine, Clean Catch   Result Value Ref Range    Color, UA Yellow Yellow, Straw, Dark Yellow, Tamela    Appearance, UA Clear Clear    pH, UA 7.0 5.0 - 9.0    Specific Pearson, UA 1.003 1.003 - 1.030    Glucose, UA Negative Negative    Ketones, UA Negative Negative    Bilirubin, UA Negative Negative    Blood, UA Negative Negative    Protein, UA Negative Negative    Leuk Esterase, UA Negative Negative    Nitrite, UA Negative Negative    Urobilinogen, UA 0.2 E.U./dL 0.2 - 1.0 E.U./dL   CBC (No Diff)   Result Value Ref Range    WBC 7.71 3.20 - 9.80 10*3/mm3    RBC 3.11 (L) 3.77 - 5.16 10*6/mm3    Hemoglobin 9.8 (L) 12.0 - 15.5 g/dL    Hematocrit 29.9 (L) 35.0 - 45.0 %    MCV 96.1 80.0 - 98.0 fL    MCH 31.5 26.5 - 34.0 pg    MCHC 32.8 31.4 - 36.0 g/dL    RDW 14.2 11.5 - 14.5 %    RDW-SD 49.7 (H) 36.4 - 46.3 fl    MPV 10.6 8.0 - 12.0 fL    Platelets 326 150 - 450 10*3/mm3   Comprehensive Metabolic Panel   Result Value Ref Range    Glucose 97 60 - 100 mg/dL    BUN 15 7 - 21 mg/dL    Creatinine 0.68 0.50 - 1.00 mg/dL    Sodium 137 137 - 145 mmol/L    Potassium 4.6 3.5 - 5.1 mmol/L    Chloride 104 95 - 110 mmol/L    CO2 26.0 22.0 - 31.0 mmol/L    Calcium  9.5 8.4 - 10.2 mg/dL    Total Protein 6.4 6.3 - 8.6 g/dL    Albumin 3.90 3.40 - 4.80 g/dL    ALT (SGPT) 43 9 - 52 U/L    AST (SGOT) 26 14 - 36 U/L    Alkaline Phosphatase 109 38 - 126 U/L    Total Bilirubin 0.1 (L) 0.2 - 1.3 mg/dL    eGFR Non  Amer 91 51 - 120 mL/min/1.73    Globulin 2.5 2.3 - 3.5 gm/dL    A/G Ratio 1.6 1.1 - 1.8 g/dL    BUN/Creatinine Ratio 22.1 7.0 - 25.0    Anion Gap 7.0 5.0 - 15.0 mmol/L     *Note: Due to a large number of results and/or encounters for the requested time period, some results have not been displayed. A complete set of results can be found in Results Review.       Some portions of this note have been dictated using voice recognition software and may contain errors and/or omissions.

## 2018-11-20 NOTE — PATIENT INSTRUCTIONS

## 2018-12-12 ENCOUNTER — OFFICE VISIT (OUTPATIENT)
Dept: OBSTETRICS AND GYNECOLOGY | Facility: CLINIC | Age: 53
End: 2018-12-12

## 2018-12-12 VITALS
BODY MASS INDEX: 22.82 KG/M2 | SYSTOLIC BLOOD PRESSURE: 122 MMHG | HEART RATE: 100 BPM | HEIGHT: 62 IN | DIASTOLIC BLOOD PRESSURE: 73 MMHG | WEIGHT: 124 LBS

## 2018-12-12 DIAGNOSIS — F90.2 ATTENTION DEFICIT HYPERACTIVITY DISORDER (ADHD), COMBINED TYPE: Primary | ICD-10-CM

## 2018-12-12 DIAGNOSIS — F17.200 SMOKER: ICD-10-CM

## 2018-12-12 DIAGNOSIS — N95.1 MENOPAUSAL SYNDROME: ICD-10-CM

## 2018-12-12 DIAGNOSIS — F33.41 RECURRENT MAJOR DEPRESSIVE DISORDER, IN PARTIAL REMISSION (HCC): ICD-10-CM

## 2018-12-12 DIAGNOSIS — F31.60 BIPOLAR 1 DISORDER, MIXED (HCC): ICD-10-CM

## 2018-12-12 PROCEDURE — 99213 OFFICE O/P EST LOW 20 MIN: CPT | Performed by: OBSTETRICS & GYNECOLOGY

## 2018-12-12 RX ORDER — DEXTROAMPHETAMINE SACCHARATE, AMPHETAMINE ASPARTATE, DEXTROAMPHETAMINE SULFATE AND AMPHETAMINE SULFATE 7.5; 7.5; 7.5; 7.5 MG/1; MG/1; MG/1; MG/1
30 TABLET ORAL
Qty: 60 TABLET | Refills: 0 | Status: SHIPPED | OUTPATIENT
Start: 2018-12-12 | End: 2019-01-16 | Stop reason: SDUPTHER

## 2018-12-12 RX ORDER — BUPROPION HYDROCHLORIDE 150 MG/1
150 TABLET, EXTENDED RELEASE ORAL 2 TIMES DAILY
Qty: 60 TABLET | Refills: 12 | Status: SHIPPED | OUTPATIENT
Start: 2018-12-12 | End: 2019-03-27 | Stop reason: SDUPTHER

## 2018-12-12 NOTE — PROGRESS NOTES
Anh Koch is a 53 y.o. y/o female     Chief Complaint: ADHD, depression, lethargy, menopausal syndrome, smoker    HPI: 53-year-old  with two miscarriages and two vaginal deliveries.  She has had a hysterectomy with BSO.        She smokes a half pack cigarettes per day, but has been able to cut back somewhat.  She has quit in the past, but then stress comes about, and she starts again.        She takes Zoloft 100 mg daily.      She is interested in quitting smoking.  She started Wellbutrin  mg twice daily, and she has cut back a little on her smoking.      She is taking Adderall 30 mg every morning, and she is not having any problems with that.     She is very lethargic.     She takes vitamin D3 and B12 supplementation.     On 2018, we discussed trying to cut back on her Zoloft, and increasing Adderall to 30 mg every morning and noon time.       2018, she is not having any problems with this.     2018, I continued to counseled her to quit smoking.  She states that she is not having any problems with the Adderall.    2018, she lost 2 pounds.  Continue to  her to quit smoking.    2018, she lost 2 pounds.  I am continuing to  her to quit smoking.    2018, her weight remains the same.  She is still smoking the same.  She has been counseled to quit.    2018, she gained 1-1/2 pounds.  Still smoking in spite of being counseled to quit.    October 10, 2018, I continued counseling her to quit smoking.  I refilled her Adderall.  Her weight is stable.    2000 1824 pounds with BMI 22.7.  Blood pressure 122/73.  Pulse 100.  I refilled her Adderall.  I continued to  her to quit smoking.     Review of Systems   Constitutional: Negative for activity change, appetite change, chills, diaphoresis, fatigue, fever and unexpected weight change.   Gastrointestinal: Negative for abdominal pain, constipation, diarrhea  and nausea.   Genitourinary: Negative for difficulty urinating, dyspareunia, dysuria, pelvic pain, urgency, vaginal bleeding, vaginal discharge and vaginal pain.   Neurological: Negative for headaches.   Psychiatric/Behavioral: Negative for dysphoric mood. The patient is not nervous/anxious.    All other systems reviewed and are negative.          Physical Exam   Constitutional: She is oriented to person, place, and time. She appears well-developed and well-nourished. No distress.    124 pounds with BMI 22.7.  Blood pressure 122/73.  Pulse 100.  HENT:   Head: Normocephalic and atraumatic.   Eyes: Conjunctivae and EOM are normal. Pupils are equal, round, and reactive to light.   Neck: Normal range of motion. Neck supple. No JVD present. No tracheal deviation present. No thyromegaly present.   Cardiovascular: Normal rate, regular rhythm, normal heart sounds and intact distal pulses. Exam reveals no gallop and no friction rub.   No murmur heard.  Pulmonary/Chest: Effort normal and breath sounds normal. No stridor. No respiratory distress. She has no wheezes. She has no rales. She exhibits no tenderness.   Abdominal: Soft. Bowel sounds are normal. She exhibits no distension and no mass. There is no tenderness. There is no rebound and no guarding. No hernia.   Musculoskeletal: Normal range of motion. She exhibits no edema, tenderness or deformity.   Lymphadenopathy:     She has no cervical adenopathy.   Neurological: She is alert and oriented to person, place, and time. She has normal reflexes. She displays normal reflexes. No cranial nerve deficit. She exhibits normal muscle tone. Coordination normal.   Skin: Skin is warm and dry. No rash noted. She is not diaphoretic. No erythema. No pallor.   Psychiatric: She has a normal mood and affect. Her behavior is normal. Judgment and thought content normal.   Nursing note and vitals reviewed.      Breast ROS: negative    The following portions of the patient's history were  reviewed and updated as appropriate: allergies, current medications, past family history, past medical history, past social history, past surgical history and problem list.    No Known Allergies       Current Outpatient Medications:   •  amphetamine-dextroamphetamine (ADDERALL) 30 MG tablet, Take 1 tablet by mouth 2 (Two) Times a Day., Disp: 60 tablet, Rfl: 0  •  buPROPion SR (WELLBUTRIN SR) 150 MG 12 hr tablet, Take 1 tablet by mouth 2 (Two) Times a Day., Disp: 60 tablet, Rfl: 12  •  CloNIDine (CATAPRES) 0.1 MG tablet, Take 1 tablet by mouth Every Night., Disp: 30 tablet, Rfl: 5  •  Glecaprevir-Pibrentasvir (MAVYRET) 100-40 MG tablet, Take 3 tablets by mouth Daily., Disp: 84 tablet, Rfl: 1  •  hydrochlorothiazide (HYDRODIURIL) 25 MG tablet, Take 1 tablet by mouth Daily., Disp: 30 tablet, Rfl: 5  •  levocetirizine (XYZAL) 5 MG tablet, Take 1 tablet by mouth Every Evening., Disp: 30 tablet, Rfl: 5     The patient has a family history of   Family History   Problem Relation Age of Onset   • Hypertension Mother    • Diabetes Mother    • Heart attack Father    • Sudden death Father    • Heart disease Father         Past Medical History:   Diagnosis Date   • Abnormal liver function    • Anxiety    • Attention deficit hyperactivity disorder (ADHD), combined type 10/23/2017   • Basal cell carcinoma    • Bipolar disorder (CMS/HCC)    • Chronic hepatitis C (CMS/HCC)     1a. HCV Fibrosure .04/F0, necroinflam .12/A0   • Elevated levels of transaminase & lactic acid dehydrogenase    • Hallux valgus with bunions    • Hepatitis C    • Kidney stone    • Low back pain    • Menopausal and female climacteric states 2017   • Recurrent major depressive disorder, in partial remission (CMS/HCC) 2017   • Smoker 2017        OB History      Para Term  AB Living    5 2            SAB TAB Ectopic Molar Multiple Live Births                          Social History     Socioeconomic History   • Marital status: Single  "    Spouse name: Not on file   • Number of children: Not on file   • Years of education: Not on file   • Highest education level: Not on file   Social Needs   • Financial resource strain: Not on file   • Food insecurity - worry: Not on file   • Food insecurity - inability: Not on file   • Transportation needs - medical: Not on file   • Transportation needs - non-medical: Not on file   Occupational History   • Not on file   Tobacco Use   • Smoking status: Current Every Day Smoker     Packs/day: 0.50     Years: 40.00     Pack years: 20.00     Types: Cigarettes     Start date: 1981   • Smokeless tobacco: Never Used   Substance and Sexual Activity   • Alcohol use: Yes     Alcohol/week: 1.8 oz     Types: 1 Glasses of wine, 1 Cans of beer, 1 Shots of liquor per week     Frequency: 2-4 times a month     Comment: Ocassionally   • Drug use: No   • Sexual activity: Not Currently   Other Topics Concern   • Not on file   Social History Narrative   • Not on file        Past Surgical History:   Procedure Laterality Date   • BUNIONECTOMY Right    • COLONOSCOPY  02/24/2016    Normal colon.No specimens.   • CYSTOSCOPY W/ URETEROSCOPY W/ LITHOTRIPSY     • PARTIAL HYSTERECTOMY     • TUBAL ABDOMINAL LIGATION          Patient Active Problem List   Diagnosis   • Chronic hepatitis C (CMS/HCC)   • Hallux valgus with bunions   • Anxiety   • Bipolar 1 disorder, mixed (CMS/HCC)   • Unintentional weight loss   • Menopausal and female climacteric states   • Smoker   • Recurrent major depressive disorder, in partial remission (CMS/HCC)   • Attention deficit hyperactivity disorder (ADHD), combined type        Documented Vitals    12/12/18 1148   BP: 122/73   Pulse: 100   Weight: 56.2 kg (124 lb)   Height: 157.5 cm (62\")   PainSc: 0-No pain            Assessment        Diagnosis Plan   1. Attention deficit hyperactivity disorder (ADHD), combined type     2. Menopausal and female climacteric states     3. Bipolar 1 disorder, mixed (CMS/HCC)     4. " Smoker     5. Recurrent major depressive disorder, in partial remission (CMS/HCC)           Plan      1. Refilled Adderall 30 mg every morning and every noon.  2. Refilled Wellbutrin  mg twice a day.  3. Continue vitamin D3 and B12 supplementation.  4. Encouraged in diet and exercise.   5. Follow-up in 4 weeks.  Follow-up sooner as needed.            This document has been electronically signed by Bharat Sagastume MD on December 12, 2018 12:22 PM

## 2018-12-18 ENCOUNTER — OFFICE VISIT (OUTPATIENT)
Dept: GASTROENTEROLOGY | Facility: CLINIC | Age: 53
End: 2018-12-18

## 2018-12-18 VITALS
BODY MASS INDEX: 23.74 KG/M2 | WEIGHT: 129 LBS | HEIGHT: 62 IN | DIASTOLIC BLOOD PRESSURE: 74 MMHG | SYSTOLIC BLOOD PRESSURE: 122 MMHG | HEART RATE: 74 BPM

## 2018-12-18 DIAGNOSIS — B18.2 CHRONIC HEPATITIS C WITHOUT HEPATIC COMA (HCC): Primary | ICD-10-CM

## 2018-12-18 DIAGNOSIS — K74.00 HEPATIC FIBROSIS: ICD-10-CM

## 2018-12-18 DIAGNOSIS — R74.8 ELEVATED LIVER ENZYMES: ICD-10-CM

## 2018-12-18 PROCEDURE — 99213 OFFICE O/P EST LOW 20 MIN: CPT | Performed by: PHYSICIAN ASSISTANT

## 2018-12-18 NOTE — PATIENT INSTRUCTIONS

## 2018-12-18 NOTE — PROGRESS NOTES
Chief Complaint   Patient presents with   • Hepatitis C   • hepatic fibrosis       ENDO PROCEDURE ORDERED:    Subjective    Anh Koch is a 53 y.o. female. she is here today for follow-up.    History of Present Illness    Patient is seen on a recheck of her chronic/active hepatitis C, herpetic fibrosis.  Last seen 11/20/18.  Genotype 1a/F0, treatment naive.  She has been approved for 8 weeks of Mavyret treatment through [facility].  We discussed the potential drug interactions, potential side effects.  She currently denies abdominal pain, heartburn, nausea, vomiting, or dysphagia.  Bowel movements are regular without blood or mucus.  Weight is down 1 pound since last visit.  Last colonoscopy 02/24/16.        A/P:  Patient with chronic/active hepatitis C, ready to begin treatment on Mavyret.  Will schedule her laboratories as soon as she gets a shipment and start date.  She will be due for hepatoma screening in December and a right upper quadrant ultrasound was added as well as an AFP with her next laboratories.  Will plan follow-up in 6 weeks, further pending clinical course and the results of the above.  She is instructed to edil her laboratories every 2 weeks.  She is encouraged to contact the office if she has any interim difficulties.                The following portions of the patient's history were reviewed and updated as appropriate:   Past Medical History:   Diagnosis Date   • Abnormal liver function    • Anxiety    • Attention deficit hyperactivity disorder (ADHD), combined type 10/23/2017   • Basal cell carcinoma    • Bipolar disorder (CMS/HCC)    • Chronic hepatitis C (CMS/HCC)     1a. HCV Fibrosure .04/F0, necroinflam .12/A0   • Elevated levels of transaminase & lactic acid dehydrogenase    • Hallux valgus with bunions    • Hepatitis C    • Kidney stone    • Low back pain    • Menopausal and female climacteric states 9/11/2017   • Recurrent major depressive disorder, in partial remission  (CMS/[facility]) 2017   • Smoker 2017     Past Surgical History:   Procedure Laterality Date   • BUNIONECTOMY Right    • COLONOSCOPY  2016    Normal colon.No specimens.   • CYSTOSCOPY W/ URETEROSCOPY W/ LITHOTRIPSY     • PARTIAL HYSTERECTOMY     • TUBAL ABDOMINAL LIGATION       Family History   Problem Relation Age of Onset   • Hypertension Mother    • Diabetes Mother    • Heart attack Father    • Sudden death Father    • Heart disease Father      OB History      Para Term  AB Living    5 2            SAB TAB Ectopic Molar Multiple Live Births                       No Known Allergies  Social History     Socioeconomic History   • Marital status: Single     Spouse name: Not on file   • Number of children: Not on file   • Years of education: Not on file   • Highest education level: Not on file   Tobacco Use   • Smoking status: Current Every Day Smoker     Packs/day: 0.50     Years: 40.00     Pack years: 20.00     Types: Cigarettes     Start date:    • Smokeless tobacco: Never Used   Substance and Sexual Activity   • Alcohol use: Yes     Alcohol/week: 1.8 oz     Types: 1 Glasses of wine, 1 Cans of beer, 1 Shots of liquor per week     Frequency: 2-4 times a month     Comment: Ocassionally   • Drug use: No   • Sexual activity: Not Currently       Current Outpatient Medications:   •  amphetamine-dextroamphetamine (ADDERALL) 30 MG tablet, Take 1 tablet by mouth 2 (Two) Times a Day., Disp: 60 tablet, Rfl: 0  •  buPROPion SR (WELLBUTRIN SR) 150 MG 12 hr tablet, Take 1 tablet by mouth 2 (Two) Times a Day., Disp: 60 tablet, Rfl: 12  •  CloNIDine (CATAPRES) 0.1 MG tablet, Take 1 tablet by mouth Every Night., Disp: 30 tablet, Rfl: 5  •  Glecaprevir-Pibrentasvir (MAVYRET) 100-40 MG tablet, Take 3 tablets by mouth Daily., Disp: 84 tablet, Rfl: 1  •  hydrochlorothiazide (HYDRODIURIL) 25 MG tablet, Take 1 tablet by mouth Daily., Disp: 30 tablet, Rfl: 5  •  levocetirizine (XYZAL) 5 MG tablet, Take 1 tablet  "by mouth Every Evening., Disp: 30 tablet, Rfl: 5  Review of Systems  Review of Systems       Objective    /74 (BP Location: Left arm)   Pulse 74   Ht 157.5 cm (62\")   Wt 58.5 kg (129 lb)   LMP  (LMP Unknown)   BMI 23.59 kg/m²   Physical Exam   Constitutional: She is oriented to person, place, and time. She appears well-developed and well-nourished. No distress.   Chronically ill   HENT:   Head: Normocephalic and atraumatic.   Eyes: EOM are normal. Pupils are equal, round, and reactive to light.   Neck: Normal range of motion.   Cardiovascular: Normal rate, regular rhythm and normal heart sounds.   Pulmonary/Chest: Effort normal and breath sounds normal.   Abdominal: Soft. Bowel sounds are normal. She exhibits no shifting dullness, no distension, no abdominal bruit, no ascites and no mass. There is no hepatosplenomegaly. There is no tenderness. There is no rigidity, no rebound, no guarding and no CVA tenderness. No hernia. Hernia confirmed negative in the ventral area.   Musculoskeletal: Normal range of motion.   Neurological: She is alert and oriented to person, place, and time.   Skin: Skin is warm and dry.   Psychiatric: She has a normal mood and affect. Her behavior is normal. Judgment and thought content normal.   Nursing note and vitals reviewed.    Assessment/Plan      1. Chronic hepatitis C without hepatic coma (CMS/HCC)    2. Elevated liver enzymes    3. Hepatic fibrosis    .   Anh was seen today for hepatitis c and hepatic fibrosis.    Diagnoses and all orders for this visit:    Chronic hepatitis C without hepatic coma (CMS/HCC)  -     US Liver    Elevated liver enzymes  -     US Liver    Hepatic fibrosis  -     US Liver        Orders placed during this encounter include:  Orders Placed This Encounter   Procedures   • US Liver     Scheduling Instructions:      RUQ     Order Specific Question:   Reason for Exam:     Answer:   HCV     Order Specific Question:   Will this be performed with " Elastography? (YEFRI and JOE ONLY)     Answer:   No       Medications prescribed:  No orders of the defined types were placed in this encounter.      Requested Prescriptions      No prescriptions requested or ordered in this encounter       Review and/or summary of lab tests, radiology, procedures, medications. Review and summary of old records and obtaining of history. The risks and benefits of my recommendations, as well as other treatment options were discussed with the patient today. Questions were answered.    Follow-up: Return in about 6 weeks (around 1/29/2019), or if symptoms worsen or fail to improve.     * Surgery not found *      This document has been electronically signed by Abdulaziz Curtis PA-C on December 21, 2018 2:33 PM      Results for orders placed or performed in visit on 10/23/18   HCV RT-PCR,Quant(Non-Graph)   Result Value Ref Range    Hepatitis C Quantitation [id number] IU/mL    HCV log10 6.394 log10 IU/mL    Test Information Comment    HCV FibroSURE   Result Value Ref Range    Fibrosis Score 0.07 0.00 - 0.21    Fibrosis Stage Comment     Necroinflammat Activity Score 0.09 0.00 - 0.17    Necroinflammat Activity Grade A0-No activity     Alpha 2-Macroglobulins, Qn 231 110 - 276 mg/dL    Haptoglobin 94 34 - 200 mg/dL    Apolipoprotein A-1 203 116 - 209 mg/dL    Total Bilirubin 0.2 0.0 - 1.2 mg/dL    GGT 20 0 - 60 IU/L    ALT (SGPT) 27 0 - 40 IU/L    HCV Qual Interp Comment     Fibrosis Scoring: Comment     Necroinflamm Activity Scoring: Comment     Limitations: Comment     Comment Comment    CBC Auto Differential   Result Value Ref Range    WBC 6.90 3.20 - 9.80 10*3/mm3    RBC 4.44 3.77 - 5.16 10*6/mm3    Hemoglobin 14.1 12.0 - 15.5 g/dL    Hematocrit 41.4 35.0 - 45.0 %    MCV 93.2 80.0 - 98.0 fL    MCH 31.8 26.5 - 34.0 pg    MCHC 34.1 31.4 - 36.0 g/dL    RDW 12.5 11.5 - 14.5 %    RDW-SD 42.3 36.4 - 46.3 fl    MPV 11.2 8.0 - 12.0 fL    Platelets 254 150 - 450 10*3/mm3    Neutrophil % 54.7  37.0 - 80.0 %    Lymphocyte % 34.2 10.0 - 50.0 %    Monocyte % 4.2 0.0 - 12.0 %    Eosinophil % 6.4 0.0 - 7.0 %    Basophil % 0.4 0.0 - 2.0 %    Immature Grans % 0.1 0.0 - 0.5 %    Neutrophils, Absolute 3.77 2.00 - 8.60 10*3/mm3    Lymphocytes, Absolute 2.36 0.60 - 4.20 10*3/mm3    Monocytes, Absolute 0.29 0.00 - 0.90 10*3/mm3    Eosinophils, Absolute 0.44 0.00 - 0.70 10*3/mm3    Basophils, Absolute 0.03 0.00 - 0.20 10*3/mm3    Immature Grans, Absolute 0.01 0.00 - 0.02 10*3/mm3   AFP Tumor Marker   Result Value Ref Range    AFP Tumor Marker 2.0 0.0 - 8.3 ng/mL   Ferritin   Result Value Ref Range    Ferritin 41.40 11.10 - 264.00 ng/mL   Results for orders placed or performed in visit on 08/24/18   Iron Profile   Result Value Ref Range    Iron 46 37 - 170 mcg/dL    TIBC 370 265 - 497 mcg/dL    Iron Saturation 12 (L) 15 - 50 %   Vitamin D 25 Hydroxy   Result Value Ref Range    25 Hydroxy, Vitamin D 62.4 30.0 - 100.0 ng/ml   TSH   Result Value Ref Range    TSH 0.620 0.460 - 4.680 mIU/mL   Hemoglobin A1c   Result Value Ref Range    Hemoglobin A1C 5.3 4 - 5.6 %   Vitamin B12   Result Value Ref Range    Vitamin B-12 >1,000 (H) 239 - 931 pg/mL   Lipid Panel   Result Value Ref Range    Total Cholesterol 180 0 - 199 mg/dL    Triglycerides 180 20 - 199 mg/dL    HDL Cholesterol 76 60 - 200 mg/dL    LDL Cholesterol  90 1 - 129 mg/dL    LDL/HDL Ratio 0.89 0.00 - 3.22   Comprehensive Metabolic Panel   Result Value Ref Range    Glucose 110 (H) 60 - 100 mg/dL    BUN 15 7 - 21 mg/dL    Creatinine 0.80 0.50 - 1.00 mg/dL    Sodium 134 (L) 137 - 145 mmol/L    Potassium 3.7 3.5 - 5.1 mmol/L    Chloride 102 95 - 110 mmol/L    CO2 26.0 22.0 - 31.0 mmol/L    Calcium 9.5 8.4 - 10.2 mg/dL    Total Protein 6.7 6.3 - 8.6 g/dL    Albumin 4.00 3.40 - 4.80 g/dL    ALT (SGPT) 32 9 - 52 U/L    AST (SGOT) 27 14 - 36 U/L    Alkaline Phosphatase 120 38 - 126 U/L    Total Bilirubin 0.2 0.2 - 1.3 mg/dL    eGFR Non  Amer 75 51 - 120 mL/min/1.73     Globulin 2.7 2.3 - 3.5 gm/dL    A/G Ratio 1.5 1.1 - 1.8 g/dL    BUN/Creatinine Ratio 18.8 7.0 - 25.0    Anion Gap 6.0 5.0 - 15.0 mmol/L   Results for orders placed or performed in visit on 12/27/17   Urinalysis With / Culture If Indicated - Urine, Clean Catch   Result Value Ref Range    Color, UA Yellow Yellow, Straw, Dark Yellow, [place]    Appearance, UA Clear Clear    pH, UA 7.0 5.0 - 9.0    Specific [place], UA 1.003 1.003 - 1.030    Glucose, UA Negative Negative    Ketones, UA Negative Negative    Bilirubin, UA Negative Negative    Blood, UA Negative Negative    Protein, UA Negative Negative    Leuk Esterase, UA Negative Negative    Nitrite, UA Negative Negative    Urobilinogen, UA 0.2 E.U./dL 0.2 - 1.0 E.U./dL   CBC (No Diff)   Result Value Ref Range    WBC 7.71 3.20 - 9.80 10*3/mm3    RBC 3.11 (L) 3.77 - 5.16 10*6/mm3    Hemoglobin 9.8 (L) 12.0 - 15.5 g/dL    Hematocrit 29.9 (L) 35.0 - 45.0 %    MCV 96.1 80.0 - 98.0 fL    MCH 31.5 26.5 - 34.0 pg    MCHC 32.8 31.4 - 36.0 g/dL    RDW 14.2 11.5 - 14.5 %    RDW-SD 49.7 (H) 36.4 - 46.3 fl    MPV 10.6 8.0 - 12.0 fL    Platelets 326 150 - 450 10*3/mm3   Comprehensive Metabolic Panel   Result Value Ref Range    Glucose 97 60 - 100 mg/dL    BUN 15 7 - 21 mg/dL    Creatinine 0.68 0.50 - 1.00 mg/dL    Sodium 137 137 - 145 mmol/L    Potassium 4.6 3.5 - 5.1 mmol/L    Chloride 104 95 - 110 mmol/L    CO2 26.0 22.0 - 31.0 mmol/L    Calcium 9.5 8.4 - 10.2 mg/dL    Total Protein 6.4 6.3 - 8.6 g/dL    Albumin 3.90 3.40 - 4.80 g/dL    ALT (SGPT) 43 9 - 52 U/L    AST (SGOT) 26 14 - 36 U/L    Alkaline Phosphatase 109 38 - 126 U/L    Total Bilirubin 0.1 (L) 0.2 - 1.3 mg/dL    eGFR Non  Amer 91 51 - 120 mL/min/1.73    Globulin 2.5 2.3 - 3.5 gm/dL    A/G Ratio 1.6 1.1 - 1.8 g/dL    BUN/Creatinine Ratio 22.1 7.0 - 25.0    Anion Gap 7.0 5.0 - 15.0 mmol/L     *Note: Due to a large number of results and/or encounters for the requested time period, some results have not been  displayed. A complete set of results can be found in Results Review.       Some portions of this note have been dictated using voice recognition software and may contain errors and/or omissions.

## 2019-01-16 ENCOUNTER — PROCEDURE VISIT (OUTPATIENT)
Dept: OBSTETRICS AND GYNECOLOGY | Facility: CLINIC | Age: 54
End: 2019-01-16

## 2019-01-16 ENCOUNTER — APPOINTMENT (OUTPATIENT)
Dept: LAB | Facility: HOSPITAL | Age: 54
End: 2019-01-16

## 2019-01-16 VITALS
HEIGHT: 62 IN | WEIGHT: 135 LBS | SYSTOLIC BLOOD PRESSURE: 124 MMHG | BODY MASS INDEX: 24.84 KG/M2 | HEART RATE: 92 BPM | DIASTOLIC BLOOD PRESSURE: 75 MMHG

## 2019-01-16 DIAGNOSIS — F90.2 ATTENTION DEFICIT HYPERACTIVITY DISORDER (ADHD), COMBINED TYPE: ICD-10-CM

## 2019-01-16 DIAGNOSIS — F17.200 SMOKER: ICD-10-CM

## 2019-01-16 DIAGNOSIS — Z01.419 ENCOUNTER FOR WELL WOMAN EXAM WITH ROUTINE GYNECOLOGICAL EXAM: Primary | ICD-10-CM

## 2019-01-16 DIAGNOSIS — F33.41 RECURRENT MAJOR DEPRESSIVE DISORDER, IN PARTIAL REMISSION (HCC): ICD-10-CM

## 2019-01-16 DIAGNOSIS — N95.1 MENOPAUSAL SYNDROME: ICD-10-CM

## 2019-01-16 DIAGNOSIS — F31.60 BIPOLAR 1 DISORDER, MIXED (HCC): ICD-10-CM

## 2019-01-16 PROCEDURE — 99396 PREV VISIT EST AGE 40-64: CPT | Performed by: OBSTETRICS & GYNECOLOGY

## 2019-01-16 PROCEDURE — G0123 SCREEN CERV/VAG THIN LAYER: HCPCS | Performed by: OBSTETRICS & GYNECOLOGY

## 2019-01-16 PROCEDURE — 87624 HPV HI-RISK TYP POOLED RSLT: CPT | Performed by: OBSTETRICS & GYNECOLOGY

## 2019-01-16 PROCEDURE — 88141 CYTOPATH C/V INTERPRET: CPT | Performed by: PATHOLOGY

## 2019-01-16 RX ORDER — DEXTROAMPHETAMINE SACCHARATE, AMPHETAMINE ASPARTATE, DEXTROAMPHETAMINE SULFATE AND AMPHETAMINE SULFATE 7.5; 7.5; 7.5; 7.5 MG/1; MG/1; MG/1; MG/1
30 TABLET ORAL
Qty: 60 TABLET | Refills: 0 | Status: SHIPPED | OUTPATIENT
Start: 2019-01-16 | End: 2019-02-13 | Stop reason: SDUPTHER

## 2019-01-16 NOTE — PROGRESS NOTES
Anh Koch is a 53 y.o. y/o female     Chief Complaint: Annual GYN exam and Pap smear.  ADHD, depression, lethargy, menopausal syndrome, smoker    HPI: 53-year-old  with two miscarriages and two vaginal deliveries.  She has had a hysterectomy with BSO.        She smokes a half pack cigarettes per day, but has been able to cut back somewhat.  She has quit in the past, but then stress comes about, and she starts again.        She takes Zoloft 100 mg daily.      She is interested in quitting smoking.  She started Wellbutrin  mg twice daily, and she has cut back a little on her smoking.      She is taking Adderall 30 mg every morning, and she is not having any problems with that.     She is very lethargic.     She takes vitamin D3 and B12 supplementation.     On 2018, we discussed trying to cut back on her Zoloft, and increasing Adderall to 30 mg every morning and noon time.       2018, she is not having any problems with this.     2018, I continued to counseled her to quit smoking.  She states that she is not having any problems with the Adderall.    2018, she lost 2 pounds.  Continue to  her to quit smoking.    2018, she lost 2 pounds.  I am continuing to  her to quit smoking.    2018, her weight remains the same.  She is still smoking the same.  She has been counseled to quit.    2018, she gained 1-1/2 pounds.  Still smoking in spite of being counseled to quit.    October 10, 2018, I continued counseling her to quit smoking.  I refilled her Adderall.  Her weight is stable.    2018, 140 pounds with BMI 22.7.  Blood pressure 122/73.  Pulse 100.  I refilled her Adderall.      2019, 135 pounds with BMI 24.7.  Blood pressure /75.  Pulse 92.  Pap smear performed.  I refilled her Adderall.  I continued to  her to quit smoking.         Review of Systems   Constitutional: Negative for  activity change, appetite change, chills, diaphoresis, fatigue, fever and unexpected weight change.   Gastrointestinal: Negative for abdominal pain, constipation, diarrhea and nausea.   Genitourinary: Negative for difficulty urinating, dyspareunia, dysuria, pelvic pain, urgency, vaginal bleeding, vaginal discharge and vaginal pain.   Neurological: Negative for headaches.   Psychiatric/Behavioral: Negative for dysphoric mood. The patient is not nervous/anxious.    All other systems reviewed and are negative.          Physical Exam   Constitutional: She is oriented to person, place, and time. She appears well-developed and well-nourished. No distress.    124 pounds with BMI 22.7.  Blood pressure 122/73.  Pulse 100.  HENT:   Head: Normocephalic and atraumatic.   Eyes: Conjunctivae and EOM are normal. Pupils are equal, round, and reactive to light.   Neck: Normal range of motion. Neck supple. No JVD present. No tracheal deviation present. No thyromegaly present.   Cardiovascular: Normal rate, regular rhythm, normal heart sounds and intact distal pulses. Exam reveals no gallop and no friction rub.   No murmur heard.  Pulmonary/Chest: Effort normal and breath sounds normal. No stridor. No respiratory distress. She has no wheezes. She has no rales. She exhibits no tenderness.   Abdominal: Soft. Bowel sounds are normal. She exhibits no distension and no mass. There is no tenderness. There is no rebound and no guarding. No hernia.   Musculoskeletal: Normal range of motion. She exhibits no edema, tenderness or deformity.   Lymphadenopathy:     She has no cervical adenopathy.   Neurological: She is alert and oriented to person, place, and time. She has normal reflexes. She displays normal reflexes. No cranial nerve deficit. She exhibits normal muscle tone. Coordination normal.   Skin: Skin is warm and dry. No rash noted. She is not diaphoretic. No erythema. No pallor.   Psychiatric: She has a normal mood and affect. Her  behavior is normal. Judgment and thought content normal.   Nursing note and vitals reviewed.      Breast ROS: negative    The following portions of the patient's history were reviewed and updated as appropriate: allergies, current medications, past family history, past medical history, past social history, past surgical history and problem list.    No Known Allergies       Current Outpatient Medications:   •  amphetamine-dextroamphetamine (ADDERALL) 30 MG tablet, Take 1 tablet by mouth 2 (Two) Times a Day., Disp: 60 tablet, Rfl: 0  •  buPROPion SR (WELLBUTRIN SR) 150 MG 12 hr tablet, Take 1 tablet by mouth 2 (Two) Times a Day., Disp: 60 tablet, Rfl: 12  •  CloNIDine (CATAPRES) 0.1 MG tablet, Take 1 tablet by mouth Every Night., Disp: 30 tablet, Rfl: 5  •  Glecaprevir-Pibrentasvir (MAVYRET) 100-40 MG tablet, Take 3 tablets by mouth Daily., Disp: 84 tablet, Rfl: 1  •  hydrochlorothiazide (HYDRODIURIL) 25 MG tablet, Take 1 tablet by mouth Daily., Disp: 30 tablet, Rfl: 5  •  levocetirizine (XYZAL) 5 MG tablet, Take 1 tablet by mouth Every Evening., Disp: 30 tablet, Rfl: 5     The patient has a family history of   Family History   Problem Relation Age of Onset   • Hypertension Mother    • Diabetes Mother    • Heart attack Father    • Sudden death Father    • Heart disease Father         Past Medical History:   Diagnosis Date   • Abnormal liver function    • Anxiety    • Attention deficit hyperactivity disorder (ADHD), combined type 10/23/2017   • Basal cell carcinoma    • Bipolar disorder (CMS/HCC)    • Chronic hepatitis C (CMS/HCC)     1a. HCV Fibrosure .04/, necroinflam .12/A0   • Elevated levels of transaminase & lactic acid dehydrogenase    • Hallux valgus with bunions    • Hepatitis C    • Kidney stone    • Low back pain    • Menopausal and female climacteric states 2017   • Recurrent major depressive disorder, in partial remission (CMS/HCC) 2017   • Smoker 2017        OB History      Para  "Term  AB Living    5 2            SAB TAB Ectopic Molar Multiple Live Births                          Social History     Socioeconomic History   • Marital status: Single     Spouse name: Not on file   • Number of children: Not on file   • Years of education: Not on file   • Highest education level: Not on file   Social Needs   • Financial resource strain: Not on file   • Food insecurity - worry: Not on file   • Food insecurity - inability: Not on file   • Transportation needs - medical: Not on file   • Transportation needs - non-medical: Not on file   Occupational History   • Not on file   Tobacco Use   • Smoking status: Current Every Day Smoker     Packs/day: 0.50     Years: 40.00     Pack years: 20.00     Types: Cigarettes     Start date:    • Smokeless tobacco: Never Used   Substance and Sexual Activity   • Alcohol use: Yes     Alcohol/week: 1.8 oz     Types: 1 Glasses of wine, 1 Cans of beer, 1 Shots of liquor per week     Frequency: 2-4 times a month     Comment: Ocassionally   • Drug use: No   • Sexual activity: Not Currently   Other Topics Concern   • Not on file   Social History Narrative   • Not on file        Past Surgical History:   Procedure Laterality Date   • BUNIONECTOMY Right    • COLONOSCOPY  2016    Normal colon.No specimens.   • CYSTOSCOPY W/ URETEROSCOPY W/ LITHOTRIPSY     • PARTIAL HYSTERECTOMY     • TUBAL ABDOMINAL LIGATION          Patient Active Problem List   Diagnosis   • Chronic hepatitis C (CMS/HCC)   • Hallux valgus with bunions   • Anxiety   • Bipolar 1 disorder, mixed (CMS/HCC)   • Unintentional weight loss   • Menopausal and female climacteric states   • Smoker   • Recurrent major depressive disorder, in partial remission (CMS/HCC)   • Attention deficit hyperactivity disorder (ADHD), combined type        Documented Vitals    19 1119   BP: 124/75   Pulse: 92   Weight: 61.2 kg (135 lb)   Height: 157.5 cm (62\")   PainSc: 0-No pain            Assessment        " Diagnosis Plan   1. Encounter for well woman exam with routine gynecological exam  Liquid-based Pap Smear, Screening   2. Attention deficit hyperactivity disorder (ADHD), combined type     3. Menopausal and female climacteric states     4. Bipolar 1 disorder, mixed (CMS/HCC)     5. Smoker     6. Recurrent major depressive disorder, in partial remission (CMS/HCC)           Plan      1. Refilled Adderall 30 mg every morning and every noon.  2. Refilled Wellbutrin  mg twice a day.  3. Continue vitamin D3 and B12 supplementation.  4. Encouraged in diet and exercise.   5. Follow-up in 4 weeks.  Follow-up sooner as needed.            This document has been electronically signed by Bharat Sagastume MD on January 16, 2019 11:53 AM

## 2019-01-17 NOTE — PROGRESS NOTES
Anh Koch is a 53 y.o. y/o female     Chief Complaint: ADHD, depression, lethargy, menopausal syndrome, smoker    HPI: 53-year-old  with two miscarriages and two vaginal deliveries.  She has had a hysterectomy with BSO.        She smokes a half pack cigarettes per day, but has been able to cut back somewhat.  She has quit in the past, but then stress comes about, and she starts again.        She takes Zoloft 100 mg daily.      She is interested in quitting smoking.  She started Wellbutrin  mg twice daily, and she has cut back a little on her smoking.      She is taking Adderall 30 mg every morning, and she is not having any problems with that.     She is very lethargic.     She takes vitamin D3 and B12 supplementation.     On 2018, we discussed trying to cut back on her Zoloft, and increasing Adderall to 30 mg every morning and noon time.       2018, she is not having any problems with this.     2018, I continued to counseled her to quit smoking.  She states that she is not having any problems with the Adderall.    2018, she lost 2 pounds.  Continue to  her to quit smoking.    2018, she lost 2 pounds.  I am continuing to  her to quit smoking.    2018, her weight remains the same.  She is still smoking the same.  She has been counseled to quit.    2018, she gained 1-1/2 pounds.  Still smoking in spite of being counseled to quit.    October 10, 2018, I continued counseling her to quit smoking.  I refilled her Adderall.  Her weight is stable.    2000 1824 pounds with BMI 22.7.  Blood pressure 122/73.  Pulse 100.  I refilled her Adderall.  I continued to  her to quit smoking.    2019, 135 pounds with BMI 24.7.  Blood pressure /75.  Pulse 92.  Pap smear performed.  I refilled her Adderall.  I continued to  her to quit smoking.     Review of Systems   Constitutional: Negative  for activity change, appetite change, chills, diaphoresis, fatigue, fever and unexpected weight change.   Gastrointestinal: Negative for abdominal pain, constipation, diarrhea and nausea.   Genitourinary: Negative for difficulty urinating, dyspareunia, dysuria, pelvic pain, urgency, vaginal bleeding, vaginal discharge and vaginal pain.   Neurological: Negative for headaches.   Psychiatric/Behavioral: Negative for dysphoric mood. The patient is not nervous/anxious.    All other systems reviewed and are negative.          Physical Exam     135 pounds with BMI 24.7.  Blood pressure 1/24/75.  Pulse 92.    Constitutional: She is oriented to person, place, and time. She appears well-developed and well-nourished. No distress.    HENT:   Head: Normocephalic and atraumatic.   Eyes: Conjunctivae and EOM are normal. Pupils are equal, round, and reactive to light.   Neck: Normal range of motion. Neck supple. No JVD present. No tracheal deviation present. No thyromegaly present.   Cardiovascular: Normal rate, regular rhythm, normal heart sounds and intact distal pulses. Exam reveals no gallop and no friction rub.   No murmur heard.  Pulmonary/Chest: Effort normal and breath sounds normal. No stridor. No respiratory distress. She has no wheezes. She has no rales. She exhibits no tenderness.   Abdominal: Soft. Bowel sounds are normal. She exhibits no distension and no mass. There is no tenderness. There is no rebound and no guarding. No hernia.   Perineum without lesions.  Vagina pink and moist.  No lesions no discharge.  Cervix surgically absent.  Pap smear of the vaginal cuff  performed.  Uterus surgically absent.  Tubes and ovaries surgically absent.  No pelvic masses palpated.  Musculoskeletal: Normal range of motion. She exhibits no edema, tenderness or deformity.   Lymphadenopathy:     She has no cervical adenopathy.   Neurological: She is alert and oriented to person, place, and time. She has normal reflexes. She displays  normal reflexes. No cranial nerve deficit. She exhibits normal muscle tone. Coordination normal.   Skin: Skin is warm and dry. No rash noted. She is not diaphoretic. No erythema. No pallor.   Psychiatric: She has a normal mood and affect. Her behavior is normal. Judgment and thought content normal.   Nursing note and vitals reviewed.      Breast ROS: negative    The following portions of the patient's history were reviewed and updated as appropriate: allergies, current medications, past family history, past medical history, past social history, past surgical history and problem list.    No Known Allergies       Current Outpatient Medications:   •  amphetamine-dextroamphetamine (ADDERALL) 30 MG tablet, Take 1 tablet by mouth 2 (Two) Times a Day., Disp: 60 tablet, Rfl: 0  •  buPROPion SR (WELLBUTRIN SR) 150 MG 12 hr tablet, Take 1 tablet by mouth 2 (Two) Times a Day., Disp: 60 tablet, Rfl: 12  •  CloNIDine (CATAPRES) 0.1 MG tablet, Take 1 tablet by mouth Every Night., Disp: 30 tablet, Rfl: 5  •  Glecaprevir-Pibrentasvir (MAVYRET) 100-40 MG tablet, Take 3 tablets by mouth Daily., Disp: 84 tablet, Rfl: 1  •  hydrochlorothiazide (HYDRODIURIL) 25 MG tablet, Take 1 tablet by mouth Daily., Disp: 30 tablet, Rfl: 5  •  levocetirizine (XYZAL) 5 MG tablet, Take 1 tablet by mouth Every Evening., Disp: 30 tablet, Rfl: 5     The patient has a family history of   Family History   Problem Relation Age of Onset   • Hypertension Mother    • Diabetes Mother    • Heart attack Father    • Sudden death Father    • Heart disease Father         Past Medical History:   Diagnosis Date   • Abnormal liver function    • Anxiety    • Attention deficit hyperactivity disorder (ADHD), combined type 10/23/2017   • Basal cell carcinoma    • Bipolar disorder (CMS/HCC)    • Chronic hepatitis C (CMS/HCC)     1a. HCV Fibrosure .04/F0, necroinflam .12/A0   • Elevated levels of transaminase & lactic acid dehydrogenase    • Hallux valgus with bunions    •  Hepatitis C    • Kidney stone    • Low back pain    • Menopausal and female climacteric states 2017   • Recurrent major depressive disorder, in partial remission (CMS/HCC) 2017   • Smoker 2017        OB History      Para Term  AB Living    5 2            SAB TAB Ectopic Molar Multiple Live Births                          Social History     Socioeconomic History   • Marital status: Single     Spouse name: Not on file   • Number of children: Not on file   • Years of education: Not on file   • Highest education level: Not on file   Social Needs   • Financial resource strain: Not on file   • Food insecurity - worry: Not on file   • Food insecurity - inability: Not on file   • Transportation needs - medical: Not on file   • Transportation needs - non-medical: Not on file   Occupational History   • Not on file   Tobacco Use   • Smoking status: Current Every Day Smoker     Packs/day: 0.50     Years: 40.00     Pack years: 20.00     Types: Cigarettes     Start date:    • Smokeless tobacco: Never Used   Substance and Sexual Activity   • Alcohol use: Yes     Alcohol/week: 1.8 oz     Types: 1 Glasses of wine, 1 Cans of beer, 1 Shots of liquor per week     Frequency: 2-4 times a month     Comment: Ocassionally   • Drug use: No   • Sexual activity: Not Currently   Other Topics Concern   • Not on file   Social History Narrative   • Not on file        Past Surgical History:   Procedure Laterality Date   • BUNIONECTOMY Right    • COLONOSCOPY  2016    Normal colon.No specimens.   • CYSTOSCOPY W/ URETEROSCOPY W/ LITHOTRIPSY     • PARTIAL HYSTERECTOMY     • TUBAL ABDOMINAL LIGATION          Patient Active Problem List   Diagnosis   • Chronic hepatitis C (CMS/HCC)   • Hallux valgus with bunions   • Anxiety   • Bipolar 1 disorder, mixed (CMS/HCC)   • Unintentional weight loss   • Menopausal and female climacteric states   • Smoker   • Recurrent major depressive disorder, in partial remission  "(CMS/[facility])   • Attention deficit hyperactivity disorder (ADHD), combined type        Documented Vitals    01/16/19 1119   BP: 124/75   Pulse: 92   Weight: 61.2 kg (135 lb)   Height: 157.5 cm (62\")   PainSc: 0-No pain            Assessment        Diagnosis Plan   1. Encounter for well woman exam with routine gynecological exam  Liquid-based Pap Smear, Screening   2. Attention deficit hyperactivity disorder (ADHD), combined type     3. Menopausal and female climacteric states     4. Bipolar 1 disorder, mixed (CMS/[facility])     5. Smoker     6. Recurrent major depressive disorder, in partial remission (CMS/[facility])           Plan      1. Refilled Adderall 30 mg every morning and every noon.  2. Refilled Wellbutrin  mg twice a day.  3. Continue vitamin D3 and B12 supplementation.  4. Encouraged in diet and exercise.   5. Follow-up in 4 weeks.  Follow-up sooner as needed.            This document has been electronically signed by Bharat Sagastume MD on January 16, 2019 6:30 PM                "

## 2019-01-21 LAB
GEN CATEG CVX/VAG CYTO-IMP: NORMAL
LAB AP CASE REPORT: NORMAL
LAB AP GYN ADDITIONAL INFORMATION: NORMAL
LAB AP GYN OTHER FINDINGS: NORMAL
PATH INTERP SPEC-IMP: NORMAL
STAT OF ADQ CVX/VAG CYTO-IMP: NORMAL

## 2019-01-24 LAB — HPV I/H RISK 4 DNA CVX QL PROBE+SIG AMP: NEGATIVE

## 2019-02-12 ENCOUNTER — OFFICE VISIT (OUTPATIENT)
Dept: GASTROENTEROLOGY | Facility: CLINIC | Age: 54
End: 2019-02-12

## 2019-02-12 VITALS
HEIGHT: 62 IN | BODY MASS INDEX: 24.29 KG/M2 | HEART RATE: 79 BPM | DIASTOLIC BLOOD PRESSURE: 90 MMHG | WEIGHT: 132 LBS | SYSTOLIC BLOOD PRESSURE: 144 MMHG

## 2019-02-12 DIAGNOSIS — K74.00 HEPATIC FIBROSIS: ICD-10-CM

## 2019-02-12 DIAGNOSIS — R74.8 ELEVATED LIVER ENZYMES: ICD-10-CM

## 2019-02-12 DIAGNOSIS — B18.2 CHRONIC HEPATITIS C WITHOUT HEPATIC COMA (HCC): Primary | ICD-10-CM

## 2019-02-12 PROCEDURE — 99214 OFFICE O/P EST MOD 30 MIN: CPT | Performed by: PHYSICIAN ASSISTANT
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
## 2019-02-12 NOTE — PATIENT INSTRUCTIONS

## 2019-02-13 ENCOUNTER — OFFICE VISIT (OUTPATIENT)
Dept: OBSTETRICS AND GYNECOLOGY | Facility: CLINIC | Age: 54
End: 2019-02-13

## 2019-02-13 VITALS
WEIGHT: 129 LBS | SYSTOLIC BLOOD PRESSURE: 121 MMHG | HEART RATE: 90 BPM | HEIGHT: 62 IN | DIASTOLIC BLOOD PRESSURE: 79 MMHG | BODY MASS INDEX: 23.74 KG/M2

## 2019-02-13 DIAGNOSIS — F90.2 ATTENTION DEFICIT HYPERACTIVITY DISORDER (ADHD), COMBINED TYPE: Primary | ICD-10-CM

## 2019-02-13 DIAGNOSIS — F31.60 BIPOLAR 1 DISORDER, MIXED (HCC): ICD-10-CM

## 2019-02-13 DIAGNOSIS — F17.200 SMOKER: ICD-10-CM

## 2019-02-13 DIAGNOSIS — N95.1 MENOPAUSAL SYNDROME: ICD-10-CM

## 2019-02-13 PROCEDURE — 99213 OFFICE O/P EST LOW 20 MIN: CPT | Performed by: OBSTETRICS & GYNECOLOGY

## 2019-02-13 RX ORDER — DEXTROAMPHETAMINE SACCHARATE, AMPHETAMINE ASPARTATE, DEXTROAMPHETAMINE SULFATE AND AMPHETAMINE SULFATE 7.5; 7.5; 7.5; 7.5 MG/1; MG/1; MG/1; MG/1
30 TABLET ORAL
Qty: 60 TABLET | Refills: 0 | Status: SHIPPED | OUTPATIENT
Start: 2019-02-13 | End: 2019-03-27 | Stop reason: SDUPTHER

## 2019-02-13 NOTE — PROGRESS NOTES
Anh Koch is a 54 y.o. y/o female     Chief Complaint: ADHD, depression, lethargy, menopausal syndrome, smoker    HPI: 54-year-old  with two miscarriages and two vaginal deliveries.  She has had a hysterectomy with BSO.        She smokes a half pack cigarettes per day, but has been able to cut back somewhat.  She has quit in the past, but then stress comes about, and she starts again.        She takes Zoloft 100 mg daily.      She is interested in quitting smoking.  She started Wellbutrin  mg twice daily, and she has cut back a little on her smoking.      She is taking Adderall 30 mg every morning, and she is not having any problems with that.     She is very lethargic.     She takes vitamin D3 and B12 supplementation.     On 2018, we discussed trying to cut back on her Zoloft, and increasing Adderall to 30 mg every morning and noon time.       2018, she is not having any problems with this.     2018, I continued to counseled her to quit smoking.  She states that she is not having any problems with the Adderall.    2018, she lost 2 pounds.  Continue to  her to quit smoking.    2018, she lost 2 pounds.  I am continuing to  her to quit smoking.    2018, her weight remains the same.  She is still smoking the same.  She has been counseled to quit.    2018, she gained 1-1/2 pounds.  Still smoking in spite of being counseled to quit.    October 10, 2018, I continued counseling her to quit smoking.  I refilled her Adderall.  Her weight is stable.    2000 1824 pounds with BMI 22.7.  Blood pressure 122/73.  Pulse 100.  I refilled her Adderall.  I continued to  her to quit smoking.    2019, 135 pounds with BMI 24.7.  Blood pressure /75.  Pulse 92.  Pap smear performed.  I refilled her Adderall.  I continued to  her to quit smoking.    Pap smear 2019, negative for  intraepithelial lesion or malignancy and negative for high-risk HPV.    February 13, 2019, 129 pounds with BMI 23.6.  I refilled her Adderall.  Continue to  her to quit smoking.     Review of Systems   Constitutional: Negative for activity change, appetite change, chills, diaphoresis, fatigue, fever and unexpected weight change.   Gastrointestinal: Negative for abdominal pain, constipation, diarrhea and nausea.   Genitourinary: Negative for difficulty urinating, dyspareunia, dysuria, pelvic pain, urgency, vaginal bleeding, vaginal discharge and vaginal pain.   Neurological: Negative for headaches.   Psychiatric/Behavioral: Negative for dysphoric mood. The patient is not nervous/anxious.    All other systems reviewed and are negative.          Physical Exam     129 pounds with BMI 23.6.  Blood pressure 121/79.  Pulse 90.    Constitutional: She is oriented to person, place, and time. She appears well-developed and well-nourished. No distress.    HENT:   Head: Normocephalic and atraumatic.   Eyes: Conjunctivae and EOM are normal. Pupils are equal, round, and reactive to light.   Neck: Normal range of motion. Neck supple. No JVD present. No tracheal deviation present. No thyromegaly present.   Cardiovascular: Normal rate, regular rhythm, normal heart sounds and intact distal pulses. Exam reveals no gallop and no friction rub.   No murmur heard.  Pulmonary/Chest: Effort normal and breath sounds normal. No stridor. No respiratory distress. She has no wheezes. She has no rales. She exhibits no tenderness.   Abdominal: Soft. Bowel sounds are normal. She exhibits no distension and no mass. There is no tenderness. There is no rebound and no guarding. No hernia.   Perineum without lesions.  Vagina pink and moist.  No lesions no discharge.  Cervix surgically absent.  Pap smear of the vaginal cuff  performed.  Uterus surgically absent.  Tubes and ovaries surgically absent.  No pelvic masses palpated.  Musculoskeletal:  Normal range of motion. She exhibits no edema, tenderness or deformity.   Lymphadenopathy:     She has no cervical adenopathy.   Neurological: She is alert and oriented to person, place, and time. She has normal reflexes. She displays normal reflexes. No cranial nerve deficit. She exhibits normal muscle tone. Coordination normal.   Skin: Skin is warm and dry. No rash noted. She is not diaphoretic. No erythema. No pallor.   Psychiatric: She has a normal mood and affect. Her behavior is normal. Judgment and thought content normal.   Nursing note and vitals reviewed.      Breast ROS: negative    The following portions of the patient's history were reviewed and updated as appropriate: allergies, current medications, past family history, past medical history, past social history, past surgical history and problem list.    No Known Allergies       Current Outpatient Medications:   •  amphetamine-dextroamphetamine (ADDERALL) 30 MG tablet, Take 1 tablet by mouth 2 (Two) Times a Day., Disp: 60 tablet, Rfl: 0  •  buPROPion SR (WELLBUTRIN SR) 150 MG 12 hr tablet, Take 1 tablet by mouth 2 (Two) Times a Day., Disp: 60 tablet, Rfl: 12  •  CloNIDine (CATAPRES) 0.1 MG tablet, Take 1 tablet by mouth Every Night., Disp: 30 tablet, Rfl: 5  •  Glecaprevir-Pibrentasvir (MAVYRET) 100-40 MG tablet, Take 3 tablets by mouth Daily., Disp: 84 tablet, Rfl: 1  •  hydrochlorothiazide (HYDRODIURIL) 25 MG tablet, Take 1 tablet by mouth Daily., Disp: 30 tablet, Rfl: 5  •  levocetirizine (XYZAL) 5 MG tablet, Take 1 tablet by mouth Every Evening., Disp: 30 tablet, Rfl: 5     The patient has a family history of   Family History   Problem Relation Age of Onset   • Hypertension Mother    • Diabetes Mother    • Heart attack Father    • Sudden death Father    • Heart disease Father         Past Medical History:   Diagnosis Date   • Abnormal liver function    • Anxiety    • Attention deficit hyperactivity disorder (ADHD), combined type 10/23/2017   • Basal  cell carcinoma    • Bipolar disorder (CMS/HCC)    • Chronic hepatitis C (CMS/HCC)     1a. HCV Fibrosure .04/F0, necroinflam .12/A0   • Elevated levels of transaminase & lactic acid dehydrogenase    • Hallux valgus with bunions    • Hepatitis C    • Kidney stone    • Low back pain    • Menopausal and female climacteric states 2017   • Recurrent major depressive disorder, in partial remission (CMS/HCC) 2017   • Smoker 2017        OB History      Para Term  AB Living    5 2            SAB TAB Ectopic Molar Multiple Live Births                          Social History     Socioeconomic History   • Marital status: Single     Spouse name: Not on file   • Number of children: Not on file   • Years of education: Not on file   • Highest education level: Not on file   Social Needs   • Financial resource strain: Not on file   • Food insecurity - worry: Not on file   • Food insecurity - inability: Not on file   • Transportation needs - medical: Not on file   • Transportation needs - non-medical: Not on file   Occupational History   • Not on file   Tobacco Use   • Smoking status: Current Every Day Smoker     Packs/day: 0.50     Years: 40.00     Pack years: 20.00     Types: Cigarettes     Start date:    • Smokeless tobacco: Never Used   Substance and Sexual Activity   • Alcohol use: Yes     Alcohol/week: 1.8 oz     Types: 1 Glasses of wine, 1 Cans of beer, 1 Shots of liquor per week     Frequency: 2-4 times a month     Comment: Ocassionally   • Drug use: No   • Sexual activity: Not Currently   Other Topics Concern   • Not on file   Social History Narrative   • Not on file        Past Surgical History:   Procedure Laterality Date   • BUNIONECTOMY Right    • COLONOSCOPY  2016    Normal colon.No specimens.   • CYSTOSCOPY W/ URETEROSCOPY W/ LITHOTRIPSY     • PARTIAL HYSTERECTOMY     • TUBAL ABDOMINAL LIGATION          Patient Active Problem List   Diagnosis   • Chronic hepatitis C (CMS/HCC)   •  "Hallux valgus with bunions   • Anxiety   • Bipolar 1 disorder, mixed (CMS/HCC)   • Unintentional weight loss   • Menopausal and female climacteric states   • Smoker   • Recurrent major depressive disorder, in partial remission (CMS/HCC)   • Attention deficit hyperactivity disorder (ADHD), combined type        Documented Vitals    02/13/19 1138   BP: 121/79   Pulse: 90   Weight: 58.5 kg (129 lb)   Height: 157.5 cm (62\")   PainSc: 0-No pain            Assessment        Diagnosis Plan   1. Attention deficit hyperactivity disorder (ADHD), combined type     2. Menopausal and female climacteric states     3. Bipolar 1 disorder, mixed (CMS/HCC)     4. Smoker           Plan      1. Refilled Adderall 30 mg every morning and every noon.  2. Continue Wellbutrin  mg twice a day.  3. Continue vitamin D3 and B12 supplementation.  4. Encouraged in diet and exercise.   5. Follow-up in 4 weeks.  Follow-up sooner as needed.            This document has been electronically signed by Bharat Sagastume MD on February 13, 2019 12:23 PM                "

## 2019-02-14 ENCOUNTER — TELEPHONE (OUTPATIENT)
Dept: GASTROENTEROLOGY | Facility: CLINIC | Age: 54
End: 2019-02-14

## 2019-02-14 NOTE — TELEPHONE ENCOUNTER
Patient has been contacted to ensure that she received her Hepatitis C Medication today which she did and she will start that medication tomorrow.

## 2019-03-01 ENCOUNTER — APPOINTMENT (OUTPATIENT)
Dept: LAB | Facility: HOSPITAL | Age: 54
End: 2019-03-01

## 2019-03-01 LAB
ALBUMIN SERPL-MCNC: 4 G/DL (ref 3.4–4.8)
ALBUMIN/GLOB SERPL: 1.7 G/DL (ref 1.1–1.8)
ALP SERPL-CCNC: 83 U/L (ref 38–126)
ALT SERPL W P-5'-P-CCNC: 25 U/L (ref 9–52)
ANION GAP SERPL CALCULATED.3IONS-SCNC: 5 MMOL/L (ref 5–15)
AST SERPL-CCNC: 24 U/L (ref 14–36)
BASOPHILS # BLD AUTO: 0.04 10*3/MM3 (ref 0–0.2)
BASOPHILS NFR BLD AUTO: 0.7 % (ref 0–1.5)
BILIRUB SERPL-MCNC: 0.4 MG/DL (ref 0.2–1.3)
BUN BLD-MCNC: 10 MG/DL (ref 7–21)
BUN/CREAT SERPL: 19.2 (ref 7–25)
CALCIUM SPEC-SCNC: 9.6 MG/DL (ref 8.4–10.2)
CHLORIDE SERPL-SCNC: 100 MMOL/L (ref 95–110)
CO2 SERPL-SCNC: 28 MMOL/L (ref 22–31)
CREAT BLD-MCNC: 0.52 MG/DL (ref 0.5–1)
DEPRECATED RDW RBC AUTO: 42.5 FL (ref 37–54)
EOSINOPHIL # BLD AUTO: 0.15 10*3/MM3 (ref 0–0.4)
EOSINOPHIL NFR BLD AUTO: 2.8 % (ref 0.3–6.2)
ERYTHROCYTE [DISTWIDTH] IN BLOOD BY AUTOMATED COUNT: 12.1 % (ref 12.3–15.4)
GFR SERPL CREATININE-BSD FRML MDRD: 123 ML/MIN/1.73 (ref 51–120)
GLOBULIN UR ELPH-MCNC: 2.4 GM/DL (ref 2.3–3.5)
GLUCOSE BLD-MCNC: 99 MG/DL (ref 60–100)
HCT VFR BLD AUTO: 37.5 % (ref 34–46.6)
HGB BLD-MCNC: 12.4 G/DL (ref 12–15.9)
IMM GRANULOCYTES # BLD AUTO: 0.01 10*3/MM3 (ref 0–0.05)
IMM GRANULOCYTES NFR BLD AUTO: 0.2 % (ref 0–0.5)
LYMPHOCYTES # BLD AUTO: 1.99 10*3/MM3 (ref 0.7–3.1)
LYMPHOCYTES NFR BLD AUTO: 36.7 % (ref 19.6–45.3)
MCH RBC QN AUTO: 31.6 PG (ref 26.6–33)
MCHC RBC AUTO-ENTMCNC: 33.1 G/DL (ref 31.5–35.7)
MCV RBC AUTO: 95.7 FL (ref 79–97)
MONOCYTES # BLD AUTO: 0.29 10*3/MM3 (ref 0.1–0.9)
MONOCYTES NFR BLD AUTO: 5.4 % (ref 5–12)
NEUTROPHILS # BLD AUTO: 2.94 10*3/MM3 (ref 1.4–7)
NEUTROPHILS NFR BLD AUTO: 54.2 % (ref 42.7–76)
NRBC BLD AUTO-RTO: 0 /100 WBC (ref 0–0)
PLATELET # BLD AUTO: 224 10*3/MM3 (ref 140–450)
PMV BLD AUTO: 11.6 FL (ref 6–12)
POTASSIUM BLD-SCNC: 3.8 MMOL/L (ref 3.5–5.1)
PROT SERPL-MCNC: 6.4 G/DL (ref 6.3–8.6)
RBC # BLD AUTO: 3.92 10*6/MM3 (ref 3.77–5.28)
SODIUM BLD-SCNC: 133 MMOL/L (ref 137–145)
WBC NRBC COR # BLD: 5.42 10*3/MM3 (ref 3.4–10.8)

## 2019-03-01 PROCEDURE — 82105 ALPHA-FETOPROTEIN SERUM: CPT | Performed by: PHYSICIAN ASSISTANT

## 2019-03-01 PROCEDURE — 80053 COMPREHEN METABOLIC PANEL: CPT | Performed by: PHYSICIAN ASSISTANT

## 2019-03-01 PROCEDURE — 85025 COMPLETE CBC W/AUTO DIFF WBC: CPT | Performed by: PHYSICIAN ASSISTANT

## 2019-03-03 LAB — AFP-TM SERPL-MCNC: 1.5 NG/ML (ref 0–8.3)

## 2019-03-08 RX ORDER — HYDROCHLOROTHIAZIDE 25 MG/1
25 TABLET ORAL DAILY
Qty: 30 TABLET | Refills: 5 | Status: SHIPPED | OUTPATIENT
Start: 2019-03-08 | End: 2019-03-27 | Stop reason: SDUPTHER

## 2019-03-18 ENCOUNTER — LAB (OUTPATIENT)
Dept: LAB | Facility: HOSPITAL | Age: 54
End: 2019-03-18

## 2019-03-18 DIAGNOSIS — B18.2 CHRONIC HEPATITIS C WITHOUT HEPATIC COMA (HCC): ICD-10-CM

## 2019-03-18 DIAGNOSIS — K74.00 HEPATIC FIBROSIS: ICD-10-CM

## 2019-03-18 DIAGNOSIS — R74.8 ELEVATED LIVER ENZYMES: ICD-10-CM

## 2019-03-18 LAB
ALBUMIN SERPL-MCNC: 4 G/DL (ref 3.4–4.8)
ALBUMIN/GLOB SERPL: 1.5 G/DL (ref 1.1–1.8)
ALP SERPL-CCNC: 102 U/L (ref 38–126)
ALT SERPL W P-5'-P-CCNC: 23 U/L (ref 9–52)
ANION GAP SERPL CALCULATED.3IONS-SCNC: 9 MMOL/L (ref 5–15)
AST SERPL-CCNC: 15 U/L (ref 14–36)
BASOPHILS # BLD AUTO: 0.06 10*3/MM3 (ref 0–0.2)
BASOPHILS NFR BLD AUTO: 0.7 % (ref 0–1.5)
BILIRUB SERPL-MCNC: 0.3 MG/DL (ref 0.2–1.3)
BUN BLD-MCNC: 13 MG/DL (ref 7–21)
BUN/CREAT SERPL: 21.3 (ref 7–25)
CALCIUM SPEC-SCNC: 9.6 MG/DL (ref 8.4–10.2)
CHLORIDE SERPL-SCNC: 95 MMOL/L (ref 95–110)
CO2 SERPL-SCNC: 28 MMOL/L (ref 22–31)
CREAT BLD-MCNC: 0.61 MG/DL (ref 0.5–1)
DEPRECATED RDW RBC AUTO: 43.8 FL (ref 37–54)
EOSINOPHIL # BLD AUTO: 0.29 10*3/MM3 (ref 0–0.4)
EOSINOPHIL NFR BLD AUTO: 3.5 % (ref 0.3–6.2)
ERYTHROCYTE [DISTWIDTH] IN BLOOD BY AUTOMATED COUNT: 12.4 % (ref 12.3–15.4)
GFR SERPL CREATININE-BSD FRML MDRD: 103 ML/MIN/1.73 (ref 51–120)
GLOBULIN UR ELPH-MCNC: 2.6 GM/DL (ref 2.3–3.5)
GLUCOSE BLD-MCNC: 143 MG/DL (ref 60–100)
HCT VFR BLD AUTO: 40.3 % (ref 34–46.6)
HGB BLD-MCNC: 13.4 G/DL (ref 12–15.9)
IMM GRANULOCYTES # BLD AUTO: 0.02 10*3/MM3 (ref 0–0.05)
IMM GRANULOCYTES NFR BLD AUTO: 0.2 % (ref 0–0.5)
LYMPHOCYTES # BLD AUTO: 1.48 10*3/MM3 (ref 0.7–3.1)
LYMPHOCYTES NFR BLD AUTO: 18 % (ref 19.6–45.3)
MCH RBC QN AUTO: 32 PG (ref 26.6–33)
MCHC RBC AUTO-ENTMCNC: 33.3 G/DL (ref 31.5–35.7)
MCV RBC AUTO: 96.2 FL (ref 79–97)
MONOCYTES # BLD AUTO: 0.27 10*3/MM3 (ref 0.1–0.9)
MONOCYTES NFR BLD AUTO: 3.3 % (ref 5–12)
NEUTROPHILS # BLD AUTO: 6.1 10*3/MM3 (ref 1.4–7)
NEUTROPHILS NFR BLD AUTO: 74.3 % (ref 42.7–76)
NRBC BLD AUTO-RTO: 0 /100 WBC (ref 0–0)
PLATELET # BLD AUTO: 240 10*3/MM3 (ref 140–450)
PMV BLD AUTO: 10.7 FL (ref 6–12)
POTASSIUM BLD-SCNC: 4 MMOL/L (ref 3.5–5.1)
PROT SERPL-MCNC: 6.6 G/DL (ref 6.3–8.6)
RBC # BLD AUTO: 4.19 10*6/MM3 (ref 3.77–5.28)
SODIUM BLD-SCNC: 132 MMOL/L (ref 137–145)
WBC NRBC COR # BLD: 8.22 10*3/MM3 (ref 3.4–10.8)

## 2019-03-18 PROCEDURE — 85025 COMPLETE CBC W/AUTO DIFF WBC: CPT

## 2019-03-18 PROCEDURE — 87522 HEPATITIS C REVRS TRNSCRPJ: CPT

## 2019-03-18 PROCEDURE — 80053 COMPREHEN METABOLIC PANEL: CPT

## 2019-03-20 LAB
HCV RNA SERPL NAA+PROBE-ACNC: NORMAL IU/ML
TEST INFORMATION: NORMAL

## 2019-03-26 ENCOUNTER — OFFICE VISIT (OUTPATIENT)
Dept: GASTROENTEROLOGY | Facility: CLINIC | Age: 54
End: 2019-03-26

## 2019-03-26 VITALS
WEIGHT: 124 LBS | DIASTOLIC BLOOD PRESSURE: 78 MMHG | HEART RATE: 90 BPM | BODY MASS INDEX: 22.82 KG/M2 | SYSTOLIC BLOOD PRESSURE: 122 MMHG | HEIGHT: 62 IN

## 2019-03-26 DIAGNOSIS — K74.00 HEPATIC FIBROSIS: ICD-10-CM

## 2019-03-26 DIAGNOSIS — B18.2 CHRONIC HEPATITIS C WITHOUT HEPATIC COMA (HCC): Primary | ICD-10-CM

## 2019-03-26 DIAGNOSIS — R74.8 ELEVATED LIVER ENZYMES: ICD-10-CM

## 2019-03-26 PROCEDURE — 99213 OFFICE O/P EST LOW 20 MIN: CPT | Performed by: PHYSICIAN ASSISTANT

## 2019-03-26 NOTE — PATIENT INSTRUCTIONS

## 2019-03-26 NOTE — PROGRESS NOTES
Chief Complaint   Patient presents with   • Hepatitis C     started 8 weeks mavyret on 2-15-19       ENDO PROCEDURE ORDERED:    Subjective    Anh Koch is a 54 y.o. female. she is here today for follow-up.    History of Present Illness    SUBJECTIVE:  Patient was seen on a recheck of her chronic active hepatitis C, hepatic fibrosis.  Last seen 02/02/2019.  Genotype 1a/F0, treatment naive.  Patient still did not get her ultrasound.  She cannot give me a clear reason why she will not get it done.  She currently denies abdominal pain, heartburn, nausea, vomiting, dysphagia.  Bowel movements are regular without blood or mucus.  Weight is down 8 pounds since last visit.  Last colonoscopy 02/24/2016.  Patient started 8 weeks of Mavyret on 02/15/2019.  She is treatment naive.    Laboratories on 03/01/2019 showed normal CBC, AFP.  CMP showed sodium 133, otherwise normal.    Laboratories on 03/01/2019:  HCV RNA by PCR quantitative was not detected, normal CBC.  CMP showed glucose 143, sodium 132, otherwise normal.    ASSESSMENT/PLAN:  Patient with abdominal tenderness.  I did recommend rescheduling her for ultrasound and I strongly encouraged her to keep that appointment.  She denies reflux symptoms.  She appears to be tolerating the Mavyret with good response to treatment.  She will complete her 8 weeks.  She was recommended to continue to follow laboratory protocol.  Continue to abstain from drugs and alcohol.  We will plan follow up after she completes treatment.  Further pending clinical course and the results of the above.       The following portions of the patient's history were reviewed and updated as appropriate:   Past Medical History:   Diagnosis Date   • Abnormal liver function    • Anxiety    • Attention deficit hyperactivity disorder (ADHD), combined type 10/23/2017   • Basal cell carcinoma    • Bipolar disorder (CMS/HCC)    • Chronic hepatitis C (CMS/HCC)     1a. HCV Fibrosure .04/F0, necroinflam .12/A0    • Elevated levels of transaminase & lactic acid dehydrogenase    • Hallux valgus with bunions    • Hepatitis C    • Kidney stone    • Low back pain    • Menopausal and female climacteric states 2017   • Recurrent major depressive disorder, in partial remission (CMS/HCC) 2017   • Smoker 2017     Past Surgical History:   Procedure Laterality Date   • BUNIONECTOMY Right    • COLONOSCOPY  2016    Normal colon.No specimens.   • CYSTOSCOPY W/ URETEROSCOPY W/ LITHOTRIPSY     • PARTIAL HYSTERECTOMY     • TUBAL ABDOMINAL LIGATION       Family History   Problem Relation Age of Onset   • Hypertension Mother    • Diabetes Mother    • Heart attack Father    • Sudden death Father    • Heart disease Father      OB History      Para Term  AB Living    5 2            SAB TAB Ectopic Molar Multiple Live Births                       No Known Allergies  Social History     Socioeconomic History   • Marital status: Single     Spouse name: Not on file   • Number of children: Not on file   • Years of education: Not on file   • Highest education level: Not on file   Tobacco Use   • Smoking status: Current Every Day Smoker     Packs/day: 0.50     Years: 40.00     Pack years: 20.00     Types: Cigarettes     Start date:    • Smokeless tobacco: Never Used   Substance and Sexual Activity   • Alcohol use: Yes     Alcohol/week: 1.8 oz     Types: 1 Glasses of wine, 1 Cans of beer, 1 Shots of liquor per week     Frequency: 2-4 times a month     Comment: Ocassionally   • Drug use: No   • Sexual activity: Not Currently       Current Outpatient Medications:   •  Glecaprevir-Pibrentasvir (MAVYRET) 100-40 MG tablet, Take 3 tablets by mouth Daily., Disp: 84 tablet, Rfl: 1  •  amphetamine-dextroamphetamine (ADDERALL) 30 MG tablet, Take 1 tablet by mouth 2 (Two) Times a Day., Disp: 60 tablet, Rfl: 0  •  buPROPion SR (WELLBUTRIN SR) 150 MG 12 hr tablet, Take 1 tablet by mouth 2 (Two) Times a Day., Disp: 60 tablet,  "Rfl: 12  •  CloNIDine (CATAPRES) 0.1 MG tablet, Take 1 tablet by mouth Every Night., Disp: 30 tablet, Rfl: 5  •  fluticasone (FLONASE) 50 MCG/ACT nasal spray, 2 sprays into the nostril(s) as directed by provider Daily for 30 days., Disp: 16 g, Rfl: 5  •  hydrochlorothiazide (HYDRODIURIL) 25 MG tablet, Take 1 tablet by mouth Daily., Disp: 30 tablet, Rfl: 5  •  loratadine (CLARITIN) 10 MG tablet, Take 1 tablet by mouth Daily., Disp: 30 tablet, Rfl: 5  •  naproxen (NAPROSYN) 500 MG tablet, Take 1 tablet by mouth 2 (Two) Times a Day With Meals., Disp: 60 tablet, Rfl: 5  Review of Systems  Review of Systems       Objective    /78 (BP Location: Left arm)   Pulse 90   Ht 157.5 cm (62\")   Wt 56.2 kg (124 lb)   LMP  (LMP Unknown)   BMI 22.68 kg/m²   Physical Exam   Constitutional: She is oriented to person, place, and time. She appears well-developed and well-nourished. No distress.   Chronically ill   HENT:   Head: Normocephalic and atraumatic.   Eyes: EOM are normal. Pupils are equal, round, and reactive to light.   Neck: Normal range of motion.   Cardiovascular: Normal rate, regular rhythm and normal heart sounds.   Pulmonary/Chest: Effort normal and breath sounds normal.   Abdominal: Soft. Bowel sounds are normal. She exhibits no shifting dullness, no distension, no abdominal bruit, no ascites and no mass. There is no hepatosplenomegaly. There is tenderness. There is no rigidity, no rebound, no guarding and no CVA tenderness. No hernia. Hernia confirmed negative in the ventral area.   Musculoskeletal: Normal range of motion.   Neurological: She is alert and oriented to person, place, and time.   Skin: Skin is warm and dry.   Psychiatric: She has a normal mood and affect. Her behavior is normal. Judgment and thought content normal.   Nursing note and vitals reviewed.    Assessment/Plan      1. Chronic hepatitis C without hepatic coma (CMS/HCC)    2. Elevated liver enzymes    3. Hepatic fibrosis    .   Anh was " seen today for hepatitis c.    Diagnoses and all orders for this visit:    Chronic hepatitis C without hepatic coma (CMS/HCC)  -     CBC & Differential; Future  -     Comprehensive Metabolic Panel; Future  -     CBC & Differential; Future  -     Comprehensive Metabolic Panel; Future  -     Hepatitis C RNA, Quantitative, PCR (graph); Future  -     US Abdomen Limited    Elevated liver enzymes  -     CBC & Differential; Future  -     Comprehensive Metabolic Panel; Future  -     CBC & Differential; Future  -     Comprehensive Metabolic Panel; Future  -     Hepatitis C RNA, Quantitative, PCR (graph); Future  -     US Abdomen Limited    Hepatic fibrosis  -     CBC & Differential; Future  -     Comprehensive Metabolic Panel; Future  -     CBC & Differential; Future  -     Comprehensive Metabolic Panel; Future  -     Hepatitis C RNA, Quantitative, PCR (graph); Future  -     US Abdomen Limited        Orders placed during this encounter include:  Orders Placed This Encounter   Procedures   • US Abdomen Limited     Scheduling Instructions:      RUQ     Order Specific Question:   Reason for Exam:     Answer:   HCV   • Comprehensive Metabolic Panel     Due 3/29/19     Standing Status:   Future     Standing Expiration Date:   4/5/2019   • Comprehensive Metabolic Panel     Due 4/19/19     Standing Status:   Future     Standing Expiration Date:   4/24/2019   • Hepatitis C RNA, Quantitative, PCR (graph)     Due 4/19/19     Standing Status:   Future     Standing Expiration Date:   4/24/2019   • CBC & Differential     Due 3/29/19     Standing Status:   Future     Standing Expiration Date:   4/5/2019     Order Specific Question:   Manual Differential     Answer:   No   • CBC & Differential     Due 4/19/19     Standing Status:   Future     Standing Expiration Date:   4/24/2019     Order Specific Question:   Manual Differential     Answer:   No       Medications prescribed:  No orders of the defined types were placed in this  encounter.      Requested Prescriptions      No prescriptions requested or ordered in this encounter       Review and/or summary of lab tests, radiology, procedures, medications. Review and summary of old records and obtaining of history. The risks and benefits of my recommendations, as well as other treatment options were discussed with the patient today. Questions were answered.    Follow-up: Return in about 6 weeks (around 5/7/2019), or if symptoms worsen or fail to improve.     * Surgery not found *      This document has been electronically signed by Abdulaziz Curtis PA-C on March 31, 2019 2:03 PM      Results for orders placed or performed in visit on 03/18/19   CBC Auto Differential   Result Value Ref Range    WBC 8.22 3.40 - 10.80 10*3/mm3    RBC 4.19 3.77 - 5.28 10*6/mm3    Hemoglobin 13.4 12.0 - 15.9 g/dL    Hematocrit 40.3 34.0 - 46.6 %    MCV 96.2 79.0 - 97.0 fL    MCH 32.0 26.6 - 33.0 pg    MCHC 33.3 31.5 - 35.7 g/dL    RDW 12.4 12.3 - 15.4 %    RDW-SD 43.8 37.0 - 54.0 fl    MPV 10.7 6.0 - 12.0 fL    Platelets 240 140 - 450 10*3/mm3    Neutrophil % 74.3 42.7 - 76.0 %    Lymphocyte % 18.0 (L) 19.6 - 45.3 %    Monocyte % 3.3 (L) 5.0 - 12.0 %    Eosinophil % 3.5 0.3 - 6.2 %    Basophil % 0.7 0.0 - 1.5 %    Immature Grans % 0.2 0.0 - 0.5 %    Neutrophils, Absolute 6.10 1.40 - 7.00 10*3/mm3    Lymphocytes, Absolute 1.48 0.70 - 3.10 10*3/mm3    Monocytes, Absolute 0.27 0.10 - 0.90 10*3/mm3    Eosinophils, Absolute 0.29 0.00 - 0.40 10*3/mm3    Basophils, Absolute 0.06 0.00 - 0.20 10*3/mm3    Immature Grans, Absolute 0.02 0.00 - 0.05 10*3/mm3    nRBC 0.0 0.0 - 0.0 /100 WBC   Hepatitis C RNA, Quantitative, PCR (graph)   Result Value Ref Range    Hepatitis C Quantitation HCV Not Detected IU/mL    Test Information Comment    Comprehensive Metabolic Panel   Result Value Ref Range    Glucose 143 (H) 60 - 100 mg/dL    BUN 13 7 - 21 mg/dL    Creatinine 0.61 0.50 - 1.00 mg/dL    Sodium 132 (L) 137 - 145 mmol/L     Potassium 4.0 3.5 - 5.1 mmol/L    Chloride 95 95 - 110 mmol/L    CO2 28.0 22.0 - 31.0 mmol/L    Calcium 9.6 8.4 - 10.2 mg/dL    Total Protein 6.6 6.3 - 8.6 g/dL    Albumin 4.00 3.40 - 4.80 g/dL    ALT (SGPT) 23 9 - 52 U/L    AST (SGOT) 15 14 - 36 U/L    Alkaline Phosphatase 102 38 - 126 U/L    Total Bilirubin 0.3 0.2 - 1.3 mg/dL    eGFR Non  Amer 103 51 - 120 mL/min/1.73    Globulin 2.6 2.3 - 3.5 gm/dL    A/G Ratio 1.5 1.1 - 1.8 g/dL    BUN/Creatinine Ratio 21.3 7.0 - 25.0    Anion Gap 9.0 5.0 - 15.0 mmol/L   Results for orders placed or performed in visit on 02/12/19   CBC Auto Differential   Result Value Ref Range    WBC 5.42 3.40 - 10.80 10*3/mm3    RBC 3.92 3.77 - 5.28 10*6/mm3    Hemoglobin 12.4 12.0 - 15.9 g/dL    Hematocrit 37.5 34.0 - 46.6 %    MCV 95.7 79.0 - 97.0 fL    MCH 31.6 26.6 - 33.0 pg    MCHC 33.1 31.5 - 35.7 g/dL    RDW 12.1 (L) 12.3 - 15.4 %    RDW-SD 42.5 37.0 - 54.0 fl    MPV 11.6 6.0 - 12.0 fL    Platelets 224 140 - 450 10*3/mm3    Neutrophil % 54.2 42.7 - 76.0 %    Lymphocyte % 36.7 19.6 - 45.3 %    Monocyte % 5.4 5.0 - 12.0 %    Eosinophil % 2.8 0.3 - 6.2 %    Basophil % 0.7 0.0 - 1.5 %    Immature Grans % 0.2 0.0 - 0.5 %    Neutrophils, Absolute 2.94 1.40 - 7.00 10*3/mm3    Lymphocytes, Absolute 1.99 0.70 - 3.10 10*3/mm3    Monocytes, Absolute 0.29 0.10 - 0.90 10*3/mm3    Eosinophils, Absolute 0.15 0.00 - 0.40 10*3/mm3    Basophils, Absolute 0.04 0.00 - 0.20 10*3/mm3    Immature Grans, Absolute 0.01 0.00 - 0.05 10*3/mm3    nRBC 0.0 0.0 - 0.0 /100 WBC   AFP Tumor Marker   Result Value Ref Range    AFP Tumor Marker 1.5 0.0 - 8.3 ng/mL   Comprehensive Metabolic Panel   Result Value Ref Range    Glucose 99 60 - 100 mg/dL    BUN 10 7 - 21 mg/dL    Creatinine 0.52 0.50 - 1.00 mg/dL    Sodium 133 (L) 137 - 145 mmol/L    Potassium 3.8 3.5 - 5.1 mmol/L    Chloride 100 95 - 110 mmol/L    CO2 28.0 22.0 - 31.0 mmol/L    Calcium 9.6 8.4 - 10.2 mg/dL    Total Protein 6.4 6.3 - 8.6 g/dL    Albumin  4.00 3.40 - 4.80 g/dL    ALT (SGPT) 25 9 - 52 U/L    AST (SGOT) 24 14 - 36 U/L    Alkaline Phosphatase 83 38 - 126 U/L    Total Bilirubin 0.4 0.2 - 1.3 mg/dL    eGFR Non  Amer 123 (H) 51 - 120 mL/min/1.73    Globulin 2.4 2.3 - 3.5 gm/dL    A/G Ratio 1.7 1.1 - 1.8 g/dL    BUN/Creatinine Ratio 19.2 7.0 - 25.0    Anion Gap 5.0 5.0 - 15.0 mmol/L   Results for orders placed or performed in visit on 01/16/19   HPV DNA Probe, Direct - ThinPrep Vial, Cervix   Result Value Ref Range    HPV Aptima Negative Negative   Liquid-based Pap Smear, Screening   Result Value Ref Range    Case Report       Gynecologic Cytology Report                       Case: [id number]                                  Authorizing Provider:  Bharat Sagastume MD          Collected:           01/16/2019 11:24 AM          Ordering Location:     [facility]     Received:            01/16/2019 04:25 PM                                 [facility]                                                                 First Screen:          Evelyn Smith                                                              Pathologist:           Xander Gonsales MD                                                         Specimen:    Liquid-Based Pap, Screening, Cervix                                                        Interpretation Negative for intraepithelial lesion or malignancy      General Categorization Within normal limits     Other Findings Reactive cellular changes     Specimen Adequacy Satisfactory for evaluation     Additional Information       Disclaimer: Cervical cytology is a screening test primarily for squamous cancer and its precursors and has associated false-negative and false-positive results.  Technologies such as liquid-based preparations may decrease but will not eliminate all false-negative results.  Follow-up of unexplained clinical signs and symptoms is recommended to minimize false-negative results. (The [employer] System for  Reporting Cervical Cytology: [employer], 2015).     Results for orders placed or performed in visit on 10/23/18   HCV RT-PCR,Quant(Non-Graph)   Result Value Ref Range    Hepatitis C Quantitation [id number] IU/mL    HCV log10 6.394 log10 IU/mL    Test Information Comment    HCV FibroSURE   Result Value Ref Range    Fibrosis Score 0.07 0.00 - 0.21    Fibrosis Stage Comment     Necroinflammat Activity Score 0.09 0.00 - 0.17    Necroinflammat Activity Grade A0-No activity     Alpha 2-Macroglobulins, Qn 231 110 - 276 mg/dL    Haptoglobin 94 34 - 200 mg/dL    Apolipoprotein A-1 203 116 - 209 mg/dL    Total Bilirubin 0.2 0.0 - 1.2 mg/dL    GGT 20 0 - 60 IU/L    ALT (SGPT) 27 0 - 40 IU/L    HCV Qual Interp Comment     Fibrosis Scoring: Comment     Necroinflamm Activity Scoring: Comment     Limitations: Comment     Comment Comment      *Note: Due to a large number of results and/or encounters for the requested time period, some results have not been displayed. A complete set of results can be found in Results Review.       Some portions of this note have been dictated using voice recognition software and may contain errors and/or omissions.

## 2019-03-27 ENCOUNTER — OFFICE VISIT (OUTPATIENT)
Dept: FAMILY MEDICINE CLINIC | Facility: CLINIC | Age: 54
End: 2019-03-27

## 2019-03-27 ENCOUNTER — OFFICE VISIT (OUTPATIENT)
Dept: OBSTETRICS AND GYNECOLOGY | Facility: CLINIC | Age: 54
End: 2019-03-27

## 2019-03-27 VITALS
BODY MASS INDEX: 22.71 KG/M2 | SYSTOLIC BLOOD PRESSURE: 115 MMHG | HEART RATE: 75 BPM | HEIGHT: 62 IN | DIASTOLIC BLOOD PRESSURE: 65 MMHG | WEIGHT: 123.4 LBS

## 2019-03-27 VITALS
TEMPERATURE: 98.1 F | OXYGEN SATURATION: 98 % | DIASTOLIC BLOOD PRESSURE: 60 MMHG | HEIGHT: 62 IN | SYSTOLIC BLOOD PRESSURE: 100 MMHG | RESPIRATION RATE: 20 BRPM | BODY MASS INDEX: 23.1 KG/M2 | WEIGHT: 125.5 LBS | HEART RATE: 73 BPM

## 2019-03-27 DIAGNOSIS — R53.82 CHRONIC FATIGUE: ICD-10-CM

## 2019-03-27 DIAGNOSIS — M54.50 CHRONIC BILATERAL LOW BACK PAIN WITHOUT SCIATICA: Primary | ICD-10-CM

## 2019-03-27 DIAGNOSIS — G89.29 CHRONIC BILATERAL LOW BACK PAIN WITHOUT SCIATICA: Primary | ICD-10-CM

## 2019-03-27 DIAGNOSIS — J30.1 SEASONAL ALLERGIC RHINITIS DUE TO POLLEN: ICD-10-CM

## 2019-03-27 DIAGNOSIS — F17.200 SMOKER: ICD-10-CM

## 2019-03-27 DIAGNOSIS — F31.60 BIPOLAR 1 DISORDER, MIXED (HCC): ICD-10-CM

## 2019-03-27 DIAGNOSIS — I10 ESSENTIAL HYPERTENSION: ICD-10-CM

## 2019-03-27 DIAGNOSIS — F90.2 ATTENTION DEFICIT HYPERACTIVITY DISORDER (ADHD), COMBINED TYPE: Primary | ICD-10-CM

## 2019-03-27 DIAGNOSIS — Z13.29 SCREENING FOR THYROID DISORDER: ICD-10-CM

## 2019-03-27 DIAGNOSIS — N95.1 MENOPAUSAL SYMPTOMS: ICD-10-CM

## 2019-03-27 DIAGNOSIS — Z13.220 SCREENING FOR LIPOID DISORDERS: ICD-10-CM

## 2019-03-27 DIAGNOSIS — N95.1 MENOPAUSAL SYNDROME: ICD-10-CM

## 2019-03-27 DIAGNOSIS — Z13.1 SCREENING FOR DIABETES MELLITUS: ICD-10-CM

## 2019-03-27 PROCEDURE — 99213 OFFICE O/P EST LOW 20 MIN: CPT | Performed by: OBSTETRICS & GYNECOLOGY

## 2019-03-27 PROCEDURE — 99214 OFFICE O/P EST MOD 30 MIN: CPT | Performed by: NURSE PRACTITIONER

## 2019-03-27 RX ORDER — HYDROCHLOROTHIAZIDE 25 MG/1
25 TABLET ORAL DAILY
Qty: 30 TABLET | Refills: 5 | Status: SHIPPED | OUTPATIENT
Start: 2019-03-27 | End: 2020-09-22 | Stop reason: SDUPTHER

## 2019-03-27 RX ORDER — CLONIDINE HYDROCHLORIDE 0.1 MG/1
0.1 TABLET ORAL NIGHTLY
Qty: 30 TABLET | Refills: 5 | Status: SHIPPED | OUTPATIENT
Start: 2019-03-27 | End: 2020-09-22 | Stop reason: SDUPTHER

## 2019-03-27 RX ORDER — BUPROPION HYDROCHLORIDE 150 MG/1
150 TABLET, EXTENDED RELEASE ORAL 2 TIMES DAILY
Qty: 60 TABLET | Refills: 12 | Status: SHIPPED | OUTPATIENT
Start: 2019-03-27 | End: 2020-09-22 | Stop reason: SDUPTHER

## 2019-03-27 RX ORDER — FLUTICASONE PROPIONATE 50 MCG
2 SPRAY, SUSPENSION (ML) NASAL DAILY
Qty: 16 G | Refills: 5 | Status: SHIPPED | OUTPATIENT
Start: 2019-03-27 | End: 2019-04-26

## 2019-03-27 RX ORDER — DEXTROAMPHETAMINE SACCHARATE, AMPHETAMINE ASPARTATE, DEXTROAMPHETAMINE SULFATE AND AMPHETAMINE SULFATE 7.5; 7.5; 7.5; 7.5 MG/1; MG/1; MG/1; MG/1
30 TABLET ORAL
Qty: 60 TABLET | Refills: 0 | Status: SHIPPED | OUTPATIENT
Start: 2019-03-27

## 2019-03-27 RX ORDER — NAPROXEN 500 MG/1
500 TABLET ORAL 2 TIMES DAILY WITH MEALS
Qty: 60 TABLET | Refills: 5 | Status: SHIPPED | OUTPATIENT
Start: 2019-03-27 | End: 2020-09-22 | Stop reason: SDUPTHER

## 2019-03-27 RX ORDER — LORATADINE 10 MG/1
10 TABLET ORAL DAILY
Qty: 30 TABLET | Refills: 5 | Status: SHIPPED | OUTPATIENT
Start: 2019-03-27 | End: 2020-09-22 | Stop reason: SDUPTHER

## 2019-03-29 NOTE — PROGRESS NOTES
Anh Koch is a 54 y.o. y/o female     Chief Complaint: ADHD, depression, lethargy, menopausal syndrome, smoker    HPI: 54-year-old  with two miscarriages and two vaginal deliveries.  She has had a hysterectomy with BSO.        She smokes a half pack cigarettes per day, but has been able to cut back somewhat.  She has quit in the past, but then stress comes about, and she starts again.        She takes Zoloft 100 mg daily.      She is interested in quitting smoking.  She started Wellbutrin  mg twice daily, and she has cut back a little on her smoking.      She is taking Adderall 30 mg every morning, and she is not having any problems with that.     She is very lethargic.     She takes vitamin D3 and B12 supplementation.     On 2018, we discussed trying to cut back on her Zoloft, and increasing Adderall to 30 mg every morning and noon time.       2018, she is not having any problems with this.     2018, I continued to counseled her to quit smoking.  She states that she is not having any problems with the Adderall.    2018, she lost 2 pounds.  Continue to  her to quit smoking.    2018, she lost 2 pounds.  I am continuing to  her to quit smoking.    2018, her weight remains the same.  She is still smoking the same.  She has been counseled to quit.    2018, she gained 1-1/2 pounds.  Still smoking in spite of being counseled to quit.    October 10, 2018, I continued counseling her to quit smoking.  I refilled her Adderall.  Her weight is stable.    2000 1824 pounds with BMI 22.7.  Blood pressure 122/73.  Pulse 100.  I refilled her Adderall.  I continued to  her to quit smoking.    2019, 135 pounds with BMI 24.7.  Blood pressure /75.  Pulse 92.  Pap smear performed.  I refilled her Adderall.  I continued to  her to quit smoking.    Pap smear 2019, negative for  intraepithelial lesion or malignancy and negative for high-risk HPV.    February 13, 2019, 129 pounds with BMI 23.6.  I refilled her Adderall.  Continue to  her to quit smoking.    March 27, 2019, 123.4 pounds with BMI 22.6.  I refilled her Adderall.  I continued to  her to quit smoking.     Review of Systems   Constitutional: Negative for activity change, appetite change, chills, diaphoresis, fatigue, fever and unexpected weight change.   Gastrointestinal: Negative for abdominal pain, constipation, diarrhea and nausea.   Genitourinary: Negative for difficulty urinating, dyspareunia, dysuria, pelvic pain, urgency, vaginal bleeding, vaginal discharge and vaginal pain.   Neurological: Negative for headaches.   Psychiatric/Behavioral: Negative for dysphoric mood. The patient is not nervous/anxious.    All other systems reviewed and are negative.          Physical Exam     123.4 pounds with BMI 22.6.  Blood pressure 115/65.  Pulse 75.    Constitutional: She is oriented to person, place, and time. She appears well-developed and well-nourished. No distress.    HENT:   Head: Normocephalic and atraumatic.   Eyes: Conjunctivae and EOM are normal. Pupils are equal, round, and reactive to light.   Neck: Normal range of motion. Neck supple. No JVD present. No tracheal deviation present. No thyromegaly present.   Cardiovascular: Normal rate, regular rhythm, normal heart sounds and intact distal pulses. Exam reveals no gallop and no friction rub.   No murmur heard.  Pulmonary/Chest: Effort normal and breath sounds normal. No stridor. No respiratory distress. She has no wheezes. She has no rales. She exhibits no tenderness.   Abdominal: Soft. Bowel sounds are normal. She exhibits no distension and no mass. There is no tenderness. There is no rebound and no guarding. No hernia.   Perineum without lesions.  Vagina pink and moist.  No lesions no discharge.  Cervix surgically absent.  Pap smear of the vaginal cuff   performed.  Uterus surgically absent.  Tubes and ovaries surgically absent.  No pelvic masses palpated.  Musculoskeletal: Normal range of motion. She exhibits no edema, tenderness or deformity.   Lymphadenopathy:     She has no cervical adenopathy.   Neurological: She is alert and oriented to person, place, and time. She has normal reflexes. She displays normal reflexes. No cranial nerve deficit. She exhibits normal muscle tone. Coordination normal.   Skin: Skin is warm and dry. No rash noted. She is not diaphoretic. No erythema. No pallor.   Psychiatric: She has a normal mood and affect. Her behavior is normal. Judgment and thought content normal.   Nursing note and vitals reviewed.      Breast ROS: negative    The following portions of the patient's history were reviewed and updated as appropriate: allergies, current medications, past family history, past medical history, past social history, past surgical history and problem list.    No Known Allergies       Current Outpatient Medications:   •  amphetamine-dextroamphetamine (ADDERALL) 30 MG tablet, Take 1 tablet by mouth 2 (Two) Times a Day., Disp: 60 tablet, Rfl: 0  •  buPROPion SR (WELLBUTRIN SR) 150 MG 12 hr tablet, Take 1 tablet by mouth 2 (Two) Times a Day., Disp: 60 tablet, Rfl: 12  •  CloNIDine (CATAPRES) 0.1 MG tablet, Take 1 tablet by mouth Every Night., Disp: 30 tablet, Rfl: 5  •  fluticasone (FLONASE) 50 MCG/ACT nasal spray, 2 sprays into the nostril(s) as directed by provider Daily for 30 days., Disp: 16 g, Rfl: 5  •  Glecaprevir-Pibrentasvir (MAVYRET) 100-40 MG tablet, Take 3 tablets by mouth Daily., Disp: 84 tablet, Rfl: 1  •  hydrochlorothiazide (HYDRODIURIL) 25 MG tablet, Take 1 tablet by mouth Daily., Disp: 30 tablet, Rfl: 5  •  loratadine (CLARITIN) 10 MG tablet, Take 1 tablet by mouth Daily., Disp: 30 tablet, Rfl: 5  •  naproxen (NAPROSYN) 500 MG tablet, Take 1 tablet by mouth 2 (Two) Times a Day With Meals., Disp: 60 tablet, Rfl: 5     The  patient has a family history of   Family History   Problem Relation Age of Onset   • Hypertension Mother    • Diabetes Mother    • Heart attack Father    • Sudden death Father    • Heart disease Father         Past Medical History:   Diagnosis Date   • Abnormal liver function    • Anxiety    • Attention deficit hyperactivity disorder (ADHD), combined type 10/23/2017   • Basal cell carcinoma    • Bipolar disorder (CMS/HCC)    • Chronic hepatitis C (CMS/HCC)     1a. HCV Fibrosure .04/F0, necroinflam .12/A0   • Elevated levels of transaminase & lactic acid dehydrogenase    • Hallux valgus with bunions    • Hepatitis C    • Kidney stone    • Low back pain    • Menopausal and female climacteric states 2017   • Recurrent major depressive disorder, in partial remission (CMS/HCC) 2017   • Smoker 2017        OB History      Para Term  AB Living    5 2            SAB TAB Ectopic Molar Multiple Live Births                          Social History     Socioeconomic History   • Marital status: Single     Spouse name: Not on file   • Number of children: Not on file   • Years of education: Not on file   • Highest education level: Not on file   Tobacco Use   • Smoking status: Current Every Day Smoker     Packs/day: 0.50     Years: 40.00     Pack years: 20.00     Types: Cigarettes     Start date:    • Smokeless tobacco: Never Used   Substance and Sexual Activity   • Alcohol use: Yes     Alcohol/week: 1.8 oz     Types: 1 Glasses of wine, 1 Cans of beer, 1 Shots of liquor per week     Frequency: 2-4 times a month     Comment: Ocassionally   • Drug use: No   • Sexual activity: Not Currently        Past Surgical History:   Procedure Laterality Date   • BUNIONECTOMY Right    • COLONOSCOPY  2016    Normal colon.No specimens.   • CYSTOSCOPY W/ URETEROSCOPY W/ LITHOTRIPSY     • PARTIAL HYSTERECTOMY     • TUBAL ABDOMINAL LIGATION          Patient Active Problem List   Diagnosis   • Chronic hepatitis C  "(CMS/HCC)   • Hallux valgus with bunions   • Anxiety   • Bipolar 1 disorder, mixed (CMS/HCC)   • Unintentional weight loss   • Menopausal and female climacteric states   • Smoker   • Recurrent major depressive disorder, in partial remission (CMS/HCC)   • Attention deficit hyperactivity disorder (ADHD), combined type        Documented Vitals    03/27/19 1138   BP: 115/65   Pulse: 75   Weight: 56 kg (123 lb 6.4 oz)   Height: 157.5 cm (62\")            Assessment        Diagnosis Plan   1. Attention deficit hyperactivity disorder (ADHD), combined type     2. Menopausal and female climacteric states     3. Bipolar 1 disorder, mixed (CMS/HCC)     4. Smoker           Plan      1. Refilled Adderall 30 mg every morning and every noon.  2. Continue Wellbutrin  mg twice a day.  3. Continue vitamin D3 and B12 supplementation.  4. Counseled to quit smoking.  5. Encouraged in diet and exercise.   6. Follow-up in 4 weeks.  Follow-up sooner as needed.            This document has been electronically signed by Bharat Sagastume MD on March 28, 2019 11:19 PM                "

## 2019-04-17 NOTE — PROGRESS NOTES
Subjective   Anh Koch is a 54 y.o. female.     She presents today for difficulty with weight loss.  She reports that this is been a problem for a while now.  Upon review of her chart, I noticed that Dr. Sagastume is prescribing her Adderall that she is taking twice daily.  It appears that since she has been placed back on the Adderall she has had significant weight loss, which is a side effect of this medication.  She does still have complaints of chronic fatigue today in the office.  She is due for some repeat labs.  She is still seeing Abdulaziz Curtis regarding her chronic hepatitis C.  Her blood pressure is well controlled today in the office.  She does need refills on her allergy medications, as well as her naproxen that she takes for chronic lower back pain.  She is otherwise without any new complaints today in the office.      Anxiety   Presents for follow-up visit. Symptoms include confusion, decreased concentration, depressed mood, excessive worry, irritability, nervous/anxious behavior, panic and restlessness. Patient reports no chest pain, compulsions, dizziness, dry mouth, feeling of choking, hyperventilation, impotence, insomnia, malaise, muscle tension, nausea, obsessions, palpitations, shortness of breath or suicidal ideas. Symptoms occur constantly. The severity of symptoms is interfering with daily activities. The quality of sleep is fair.       Hypertension   This is a chronic problem. The current episode started more than 1 year ago. The problem has been resolved since onset. The problem is controlled. Associated symptoms include anxiety and malaise/fatigue. Pertinent negatives include no blurred vision, chest pain, headaches, neck pain, orthopnea, palpitations, peripheral edema, PND, shortness of breath or sweats. Risk factors for coronary artery disease include family history, post-menopausal state, stress and sedentary lifestyle. Past treatments include central alpha agonists and diuretics.  Current antihypertension treatment includes central alpha agonists and diuretics. The current treatment provides significant improvement. Compliance problems include diet, exercise and psychosocial issues.    Weight Loss   This is a recurrent problem. The current episode started more than 1 month ago. The problem occurs constantly. The problem has been gradually worsening. Associated symptoms include arthralgias, fatigue and myalgias. Pertinent negatives include no abdominal pain, anorexia, change in bowel habit, chest pain, chills, congestion, coughing, diaphoresis, fever, headaches, joint swelling, nausea, neck pain, numbness, rash, sore throat, swollen glands, urinary symptoms, vertigo, visual change, vomiting or weakness. She has tried nothing for the symptoms. The treatment provided no relief.        The following portions of the patient's history were reviewed and updated as appropriate: allergies, current medications, past family history, past medical history, past social history, past surgical history and problem list.    Review of Systems   Constitutional: Positive for fatigue, irritability, malaise/fatigue and unexpected weight loss. Negative for chills, diaphoresis and fever.   HENT: Negative.  Negative for congestion and sore throat.    Eyes: Negative.  Negative for blurred vision.   Respiratory: Negative.  Negative for cough and shortness of breath.    Cardiovascular: Negative.  Negative for chest pain, palpitations, orthopnea and PND.   Gastrointestinal: Negative.  Negative for abdominal pain, anorexia, change in bowel habit, nausea and vomiting.   Genitourinary: Negative for impotence.   Musculoskeletal: Positive for arthralgias, back pain and myalgias. Negative for joint swelling and neck pain.   Skin: Negative.  Negative for rash.   Allergic/Immunologic: Negative.    Neurological: Positive for memory problem and confusion. Negative for dizziness, vertigo, weakness and numbness.   Hematological:  Negative.    Psychiatric/Behavioral: Positive for agitation, behavioral problems, decreased concentration, dysphoric mood, sleep disturbance, depressed mood and stress. Negative for suicidal ideas. The patient is nervous/anxious. The patient does not have insomnia.        Objective   Physical Exam   Constitutional: She is oriented to person, place, and time. Vital signs are normal. She appears well-developed and well-nourished. No distress.   HENT:   Head: Normocephalic.   Right Ear: External ear normal.   Left Ear: External ear normal.   Nose: Nose normal.   Mouth/Throat: Oropharynx is clear and moist. No oropharyngeal exudate.   Eyes: Conjunctivae and EOM are normal. Pupils are equal, round, and reactive to light. Right eye exhibits no discharge. Left eye exhibits no discharge.   Neck: Normal range of motion. Neck supple. No tracheal deviation present. No thyromegaly present.   Cardiovascular: Normal rate, regular rhythm and normal heart sounds. Exam reveals no gallop and no friction rub.   No murmur heard.  Pulmonary/Chest: Effort normal and breath sounds normal. No respiratory distress. She has no wheezes. She has no rales. She exhibits no tenderness.   Musculoskeletal: Normal range of motion.        Lumbar back: She exhibits tenderness and pain.   Lymphadenopathy:     She has no cervical adenopathy.   Neurological: She is alert and oriented to person, place, and time.   Skin: Skin is warm and dry. Capillary refill takes less than 2 seconds. No rash noted. She is not diaphoretic. No erythema. No pallor.   Psychiatric: She has a normal mood and affect. Her behavior is normal. Judgment and thought content normal.   Nursing note and vitals reviewed.        Assessment/Plan   Anh was seen today for weight loss.    Diagnoses and all orders for this visit:    Chronic bilateral low back pain without sciatica  -     naproxen (NAPROSYN) 500 MG tablet; Take 1 tablet by mouth 2 (Two) Times a Day With Meals.  -     CBC &  Differential  -     Comprehensive Metabolic Panel  -     Hemoglobin A1c  -     Lipid Panel  -     TSH  -     Vitamin D 25 Hydroxy  -     Vitamin B12  -     Iron Profile  -     Ferritin  -     XR Spine Lumbar 4+ View    Menopausal symptoms  -     buPROPion SR (WELLBUTRIN SR) 150 MG 12 hr tablet; Take 1 tablet by mouth 2 (Two) Times a Day.  -     CloNIDine (CATAPRES) 0.1 MG tablet; Take 1 tablet by mouth Every Night.    Seasonal allergic rhinitis due to pollen  -     loratadine (CLARITIN) 10 MG tablet; Take 1 tablet by mouth Daily.  -     fluticasone (FLONASE) 50 MCG/ACT nasal spray; 2 sprays into the nostril(s) as directed by provider Daily for 30 days.    Essential hypertension  -     hydrochlorothiazide (HYDRODIURIL) 25 MG tablet; Take 1 tablet by mouth Daily.    Chronic fatigue    Screening for diabetes mellitus  -     Hemoglobin A1c    Screening for lipoid disorders  -     Lipid Panel    Screening for thyroid disorder  -     TSH               Patient's Body mass index is 22.95 kg/m². BMI is within normal parameters. No follow-up required..  Fasting labs.  X-ray following office visit.  Continue current medications.  Follow up as scheduled for routine follow up.  Follow up sooner for problems/concerns.  Patient verbalized understanding and agreement with plan of care.        This document has been electronically signed by APOLINAR Mooney on April 16, 2019 10:01 PM

## 2019-05-30 ENCOUNTER — TELEPHONE (OUTPATIENT)
Dept: FAMILY MEDICINE CLINIC | Facility: CLINIC | Age: 54
End: 2019-05-30

## 2019-05-30 DIAGNOSIS — Z13.820 SCREENING FOR OSTEOPOROSIS: ICD-10-CM

## 2019-05-30 NOTE — TELEPHONE ENCOUNTER
Patient left voice message to make appointment for medication refill.  Tried calling no answer and received the message mail box full.

## 2019-06-03 DIAGNOSIS — Z12.31 ENCOUNTER FOR SCREENING MAMMOGRAM FOR BREAST CANCER: ICD-10-CM

## 2019-07-02 ENCOUNTER — OFFICE VISIT (OUTPATIENT)
Dept: GASTROENTEROLOGY | Facility: CLINIC | Age: 54
End: 2019-07-02

## 2019-07-02 ENCOUNTER — APPOINTMENT (OUTPATIENT)
Dept: LAB | Facility: HOSPITAL | Age: 54
End: 2019-07-02

## 2019-07-02 VITALS
DIASTOLIC BLOOD PRESSURE: 68 MMHG | WEIGHT: 124 LBS | HEIGHT: 62 IN | BODY MASS INDEX: 22.82 KG/M2 | HEART RATE: 67 BPM | SYSTOLIC BLOOD PRESSURE: 102 MMHG

## 2019-07-02 DIAGNOSIS — B18.2 CHRONIC HEPATITIS C WITHOUT HEPATIC COMA (HCC): Primary | ICD-10-CM

## 2019-07-02 DIAGNOSIS — Z91.14 NONCOMPLIANCE WITH ANTIRETROVIRAL MEDICATION REGIMEN: ICD-10-CM

## 2019-07-02 DIAGNOSIS — K74.00 HEPATIC FIBROSIS: ICD-10-CM

## 2019-07-02 DIAGNOSIS — R74.8 ELEVATED LIVER ENZYMES: ICD-10-CM

## 2019-07-02 LAB
25(OH)D3 SERPL-MCNC: 48.4 NG/ML (ref 30–100)
ALBUMIN SERPL-MCNC: 4.4 G/DL (ref 3.5–5.2)
ALBUMIN/GLOB SERPL: 2.2 G/DL
ALP SERPL-CCNC: 82 U/L (ref 39–117)
ALT SERPL W P-5'-P-CCNC: 13 U/L (ref 1–33)
ANION GAP SERPL CALCULATED.3IONS-SCNC: 10.9 MMOL/L (ref 5–15)
AST SERPL-CCNC: 13 U/L (ref 1–32)
BILIRUB SERPL-MCNC: 0.3 MG/DL (ref 0.2–1.2)
BUN BLD-MCNC: 13 MG/DL (ref 6–20)
BUN/CREAT SERPL: 21 (ref 7–25)
CALCIUM SPEC-SCNC: 9.5 MG/DL (ref 8.6–10.5)
CHLORIDE SERPL-SCNC: 98 MMOL/L (ref 98–107)
CHOLEST SERPL-MCNC: 165 MG/DL (ref 0–200)
CO2 SERPL-SCNC: 26.1 MMOL/L (ref 22–29)
CREAT BLD-MCNC: 0.62 MG/DL (ref 0.57–1)
FERRITIN SERPL-MCNC: 47 NG/ML (ref 13–150)
GFR SERPL CREATININE-BSD FRML MDRD: 101 ML/MIN/1.73
GLOBULIN UR ELPH-MCNC: 2 GM/DL
GLUCOSE BLD-MCNC: 84 MG/DL (ref 65–99)
HBA1C MFR BLD: 4.9 % (ref 4.8–5.6)
HDLC SERPL-MCNC: 72 MG/DL (ref 40–60)
IRON 24H UR-MRATE: 91 MCG/DL (ref 37–145)
IRON SATN MFR SERPL: 23 % (ref 20–50)
LDLC SERPL CALC-MCNC: 71 MG/DL (ref 0–100)
LDLC/HDLC SERPL: 0.99 {RATIO}
POTASSIUM BLD-SCNC: 4.3 MMOL/L (ref 3.5–5.2)
PROT SERPL-MCNC: 6.4 G/DL (ref 6–8.5)
SODIUM BLD-SCNC: 135 MMOL/L (ref 136–145)
TIBC SERPL-MCNC: 398 MCG/DL (ref 298–536)
TRANSFERRIN SERPL-MCNC: 267 MG/DL (ref 200–360)
TRIGL SERPL-MCNC: 108 MG/DL (ref 0–150)
TSH SERPL DL<=0.05 MIU/L-ACNC: 0.62 MIU/ML (ref 0.27–4.2)
VLDLC SERPL-MCNC: 21.6 MG/DL (ref 5–40)

## 2019-07-02 PROCEDURE — 84466 ASSAY OF TRANSFERRIN: CPT | Performed by: NURSE PRACTITIONER

## 2019-07-02 PROCEDURE — 80061 LIPID PANEL: CPT | Performed by: NURSE PRACTITIONER

## 2019-07-02 PROCEDURE — 82306 VITAMIN D 25 HYDROXY: CPT | Performed by: NURSE PRACTITIONER

## 2019-07-02 PROCEDURE — 82607 VITAMIN B-12: CPT | Performed by: NURSE PRACTITIONER

## 2019-07-02 PROCEDURE — 99214 OFFICE O/P EST MOD 30 MIN: CPT | Performed by: PHYSICIAN ASSISTANT

## 2019-07-02 PROCEDURE — 87522 HEPATITIS C REVRS TRNSCRPJ: CPT | Performed by: PHYSICIAN ASSISTANT

## 2019-07-02 PROCEDURE — 83540 ASSAY OF IRON: CPT | Performed by: NURSE PRACTITIONER

## 2019-07-02 PROCEDURE — 82728 ASSAY OF FERRITIN: CPT | Performed by: NURSE PRACTITIONER

## 2019-07-02 PROCEDURE — 84443 ASSAY THYROID STIM HORMONE: CPT | Performed by: NURSE PRACTITIONER

## 2019-07-02 PROCEDURE — 85025 COMPLETE CBC W/AUTO DIFF WBC: CPT | Performed by: NURSE PRACTITIONER

## 2019-07-02 PROCEDURE — 83036 HEMOGLOBIN GLYCOSYLATED A1C: CPT | Performed by: NURSE PRACTITIONER

## 2019-07-02 PROCEDURE — 80053 COMPREHEN METABOLIC PANEL: CPT | Performed by: NURSE PRACTITIONER

## 2019-07-02 NOTE — PATIENT INSTRUCTIONS

## 2019-07-02 NOTE — PROGRESS NOTES
Chief Complaint   Patient presents with   • Hepatitis C     Started 8 weeks Mavyret on 2-15-19 patient claims she finished medication around April 12 2019.   • Hepatic Fibrosis     Shannon with [facility] pharmacy states that the patient only got 1 28 day supply of mavyret that was shipped on 2/13/19   • Elevated Hepatic Enzymes     [facility] on 3-4,3-5,3-7,3-15,3-19,3-22,3-23 a letter was also sent on 3-23 no response was ever recieved from the patient to the pharmacy       ENDO PROCEDURE ORDERED:    Subjective    Anh Koch is a 54 y.o. female. she is here today for follow-up.    History of Present Illness    Patient seen on a recheck of her chronic active hepatitis C, hepatic fibrosis.  Last seen 03/26/2019.  Genotype 1A/F0, treatment naive.  Patient was negative for hepatitis C at 4 weeks of treatment.  She had started her medications on 02/15/2019.  She had a 28 day supply shipped on 02/13/2019.  She never returned for a followup after her last visit until today.  She never repeated any of her laboratories that were ordered.  She claims to have finished her medications after 8 weeks of medication.  She did finally get her right upper quadrant ultrasound on 05/21/2019 at [facility]; it showed normal liver and gallbladder.  I did not challenge the patient, but according to the pharmacy as noted above they made multiple attempts to ship the patient's medication to her, she never responded.      Patient currently denies abdominal pain, heartburn, nausea, or vomiting.  Bowels are moving without blood or mucus.  Weight is stable.  Last colonoscopy 02/24/2016.      ASSESSMENT AND PLAN:  Patient with high risk noncompliance.  Could not confirm through the patient whether she completed her medications or not, but obviously she did not receive them from the pharmacy.  It is not clear if she will have cleared her hepatitis C.  She was noncompliant with  followup, noncompliant with laboratory recommendations.  She is sent to the laboratory today an HCV RNA by PCR quantitative.  She states she had laboratories drawn today.  They were done for Michelle, but did not include a RNA level, did include a CBC, CMP, etc.  She is asked to get the RNA drawn today.  We will plan followup within 1 month with further evaluation depending on that.  It is not clear if we would be able to treat her again given her compliance issues.  Will consider hepatoma screening in March.  If her studies are negative, would plan repeat testing at 3 months, which is actually approximately she is at 9 weeks post completion of treatment.  Nevertheless, we will see her in followup after the above.  Further pending clinical course and the results of the above.        The following portions of the patient's history were reviewed and updated as appropriate:   Past Medical History:   Diagnosis Date   • Abnormal liver function    • Anxiety    • Attention deficit hyperactivity disorder (ADHD), combined type 10/23/2017   • Basal cell carcinoma    • Bipolar disorder (CMS/HCC)    • Chronic hepatitis C (CMS/HCC)     1a. HCV Fibrosure .04/F0, necroinflam .12/A0   • Elevated levels of transaminase & lactic acid dehydrogenase    • Hallux valgus with bunions    • Hepatitis C    • Kidney stone    • Low back pain    • Menopausal and female climacteric states 2017   • Recurrent major depressive disorder, in partial remission (CMS/HCC) 2017   • Smoker 2017     Past Surgical History:   Procedure Laterality Date   • BUNIONECTOMY Right    • COLONOSCOPY  2016    Normal colon.No specimens.   • CYSTOSCOPY W/ URETEROSCOPY W/ LITHOTRIPSY     • PARTIAL HYSTERECTOMY     • TUBAL ABDOMINAL LIGATION       Family History   Problem Relation Age of Onset   • Hypertension Mother    • Diabetes Mother    • Heart attack Father    • Sudden death Father    • Heart disease Father      OB History      Para Term  " AB Living    5 2            SAB TAB Ectopic Molar Multiple Live Births                       No Known Allergies  Social History     Socioeconomic History   • Marital status: Single     Spouse name: Not on file   • Number of children: Not on file   • Years of education: Not on file   • Highest education level: Not on file   Tobacco Use   • Smoking status: Current Every Day Smoker     Packs/day: 0.50     Years: 40.00     Pack years: 20.00     Types: Cigarettes     Start date:    • Smokeless tobacco: Never Used   Substance and Sexual Activity   • Drug use: No   • Sexual activity: Not Currently     Current Medications:  Prior to Admission medications    Medication Sig Start Date End Date Taking? Authorizing Provider   buPROPion SR (WELLBUTRIN SR) 150 MG 12 hr tablet Take 1 tablet by mouth 2 (Two) Times a Day. 3/27/19  Yes Michelle Dickson APRN   CloNIDine (CATAPRES) 0.1 MG tablet Take 1 tablet by mouth Every Night. 3/27/19  Yes Michelle Dickson APRN   hydrochlorothiazide (HYDRODIURIL) 25 MG tablet Take 1 tablet by mouth Daily. 3/27/19  Yes Michelle Dickson APRN   loratadine (CLARITIN) 10 MG tablet Take 1 tablet by mouth Daily. 3/27/19  Yes Michelle Dickson APRN   naproxen (NAPROSYN) 500 MG tablet Take 1 tablet by mouth 2 (Two) Times a Day With Meals. 3/27/19  Yes Michelle Dickson APRN   amphetamine-dextroamphetamine (ADDERALL) 30 MG tablet Take 1 tablet by mouth 2 (Two) Times a Day. 3/27/19   Bharat Sagastume MD   Glecaprevir-Pibrentasvir (MAVYRET) 100-40 MG tablet Take 3 tablets by mouth Daily. 18  Abdulaziz Curtis PA-C     Review of Systems  Review of Systems       Objective    /68 (BP Location: Left arm)   Pulse 67   Ht 157.5 cm (62\")   Wt 56.2 kg (124 lb)   LMP  (LMP Unknown)   BMI 22.68 kg/m²   Physical Exam   Constitutional: She is oriented to person, place, and time. She appears well-developed and well-nourished. No distress.   Chronically ill   HENT:   Head: Normocephalic and atraumatic. "   Eyes: EOM are normal. Pupils are equal, round, and reactive to light.   Neck: Normal range of motion.   Cardiovascular: Normal rate, regular rhythm and normal heart sounds.   Pulmonary/Chest: Effort normal and breath sounds normal.   Abdominal: Soft. Bowel sounds are normal. She exhibits no shifting dullness, no distension, no abdominal bruit, no ascites and no mass. There is no hepatosplenomegaly. There is tenderness. There is no rigidity, no rebound, no guarding and no CVA tenderness. No hernia. Hernia confirmed negative in the ventral area.   Musculoskeletal: Normal range of motion.   Neurological: She is alert and oriented to person, place, and time.   Skin: Skin is warm and dry.   Psychiatric: She has a normal mood and affect. Her behavior is normal. Judgment and thought content normal.   Nursing note and vitals reviewed.    Assessment/Plan      1. Chronic hepatitis C without hepatic coma (CMS/HCC)    2. Noncompliance with antiretroviral medication regimen    3. Elevated liver enzymes    4. Hepatic fibrosis    .   Anh was seen today for hepatitis c, hepatic fibrosis and elevated hepatic enzymes.    Diagnoses and all orders for this visit:    Chronic hepatitis C without hepatic coma (CMS/HCC)  -     Hepatitis C RNA, Quantitative, PCR (graph)    Noncompliance with antiretroviral medication regimen    Elevated liver enzymes  -     Hepatitis C RNA, Quantitative, PCR (graph)    Hepatic fibrosis  -     Hepatitis C RNA, Quantitative, PCR (graph)        Orders placed during this encounter include:  Orders Placed This Encounter   Procedures   • Hepatitis C RNA, Quantitative, PCR (graph)       Medications prescribed:  No orders of the defined types were placed in this encounter.    Discontinued Medications       Reason for Discontinue    Glecaprevir-Pibrentasvir (MAVYRET) 100-40 MG tablet Non-compliance        Requested Prescriptions      No prescriptions requested or ordered in this encounter       Review and/or  summary of lab tests, radiology, procedures, medications. Review and summary of old records and obtaining of history. The risks and benefits of my recommendations, as well as other treatment options were discussed with the patient today. Questions were answered.    Follow-up: Return if symptoms worsen or fail to improve.     * Surgery not found *      This document has been electronically signed by Abdulaziz Curtis PA-C on July 3, 2019 5:15 PM      Results for orders placed or performed in visit on 03/27/19   CBC Auto Differential   Result Value Ref Range    WBC 6.05 3.40 - 10.80 10*3/mm3    RBC 3.99 3.77 - 5.28 10*6/mm3    Hemoglobin 13.0 12.0 - 15.9 g/dL    Hematocrit 38.6 34.0 - 46.6 %    MCV 96.7 79.0 - 97.0 fL    MCH 32.6 26.6 - 33.0 pg    MCHC 33.7 31.5 - 35.7 g/dL    RDW 12.1 (L) 12.3 - 15.4 %    RDW-SD 43.4 37.0 - 54.0 fl    MPV 12.1 (H) 6.0 - 12.0 fL    Platelets 211 140 - 450 10*3/mm3    Neutrophil % 59.1 42.7 - 76.0 %    Lymphocyte % 31.4 19.6 - 45.3 %    Monocyte % 4.8 (L) 5.0 - 12.0 %    Eosinophil % 3.6 0.3 - 6.2 %    Basophil % 0.8 0.0 - 1.5 %    Immature Grans % 0.3 0.0 - 0.5 %    Neutrophils, Absolute 3.57 1.70 - 7.00 10*3/mm3    Lymphocytes, Absolute 1.90 0.70 - 3.10 10*3/mm3    Monocytes, Absolute 0.29 0.10 - 0.90 10*3/mm3    Eosinophils, Absolute 0.22 0.00 - 0.40 10*3/mm3    Basophils, Absolute 0.05 0.00 - 0.20 10*3/mm3    Immature Grans, Absolute 0.02 0.00 - 0.05 10*3/mm3    nRBC 0.0 0.0 - 0.2 /100 WBC   Iron Profile   Result Value Ref Range    Iron 91 37 - 145 mcg/dL    Iron Saturation 23 20 - 50 %    Transferrin 267 200 - 360 mg/dL    TIBC 398 298 - 536 mcg/dL   Vitamin D 25 Hydroxy   Result Value Ref Range    25 Hydroxy, Vitamin D 48.4 30.0 - 100.0 ng/ml   TSH   Result Value Ref Range    TSH 0.620 0.270 - 4.200 mIU/mL   Hemoglobin A1c   Result Value Ref Range    Hemoglobin A1C 4.90 4.80 - 5.60 %   Ferritin   Result Value Ref Range    Ferritin 47.00 13.00 - 150.00 ng/mL   Vitamin B12   Result  Value Ref Range    Vitamin B-12 550 211 - 946 pg/mL   Lipid Panel   Result Value Ref Range    Total Cholesterol 165 0 - 200 mg/dL    Triglycerides 108 0 - 150 mg/dL    HDL Cholesterol 72 (H) 40 - 60 mg/dL    LDL Cholesterol  71 0 - 100 mg/dL    VLDL Cholesterol 21.6 5 - 40 mg/dL    LDL/HDL Ratio 0.99    Comprehensive Metabolic Panel   Result Value Ref Range    Glucose 84 65 - 99 mg/dL    BUN 13 6 - 20 mg/dL    Creatinine 0.62 0.57 - 1.00 mg/dL    Sodium 135 (L) 136 - 145 mmol/L    Potassium 4.3 3.5 - 5.2 mmol/L    Chloride 98 98 - 107 mmol/L    CO2 26.1 22.0 - 29.0 mmol/L    Calcium 9.5 8.6 - 10.5 mg/dL    Total Protein 6.4 6.0 - 8.5 g/dL    Albumin 4.40 3.50 - 5.20 g/dL    ALT (SGPT) 13 1 - 33 U/L    AST (SGOT) 13 1 - 32 U/L    Alkaline Phosphatase 82 39 - 117 U/L    Total Bilirubin 0.3 0.2 - 1.2 mg/dL    eGFR Non African Amer 101 >60 mL/min/1.73    Globulin 2.0 gm/dL    A/G Ratio 2.2 g/dL    BUN/Creatinine Ratio 21.0 7.0 - 25.0    Anion Gap 10.9 5.0 - 15.0 mmol/L   Results for orders placed or performed in visit on 03/18/19   CBC Auto Differential   Result Value Ref Range    WBC 8.22 3.40 - 10.80 10*3/mm3    RBC 4.19 3.77 - 5.28 10*6/mm3    Hemoglobin 13.4 12.0 - 15.9 g/dL    Hematocrit 40.3 34.0 - 46.6 %    MCV 96.2 79.0 - 97.0 fL    MCH 32.0 26.6 - 33.0 pg    MCHC 33.3 31.5 - 35.7 g/dL    RDW 12.4 12.3 - 15.4 %    RDW-SD 43.8 37.0 - 54.0 fl    MPV 10.7 6.0 - 12.0 fL    Platelets 240 140 - 450 10*3/mm3    Neutrophil % 74.3 42.7 - 76.0 %    Lymphocyte % 18.0 (L) 19.6 - 45.3 %    Monocyte % 3.3 (L) 5.0 - 12.0 %    Eosinophil % 3.5 0.3 - 6.2 %    Basophil % 0.7 0.0 - 1.5 %    Immature Grans % 0.2 0.0 - 0.5 %    Neutrophils, Absolute 6.10 1.40 - 7.00 10*3/mm3    Lymphocytes, Absolute 1.48 0.70 - 3.10 10*3/mm3    Monocytes, Absolute 0.27 0.10 - 0.90 10*3/mm3    Eosinophils, Absolute 0.29 0.00 - 0.40 10*3/mm3    Basophils, Absolute 0.06 0.00 - 0.20 10*3/mm3    Immature Grans, Absolute 0.02 0.00 - 0.05 10*3/mm3    nRBC  0.0 0.0 - 0.0 /100 WBC   Hepatitis C RNA, Quantitative, PCR (graph)   Result Value Ref Range    Hepatitis C Quantitation HCV Not Detected IU/mL    Test Information Comment    Comprehensive Metabolic Panel   Result Value Ref Range    Glucose 143 (H) 60 - 100 mg/dL    BUN 13 7 - 21 mg/dL    Creatinine 0.61 0.50 - 1.00 mg/dL    Sodium 132 (L) 137 - 145 mmol/L    Potassium 4.0 3.5 - 5.1 mmol/L    Chloride 95 95 - 110 mmol/L    CO2 28.0 22.0 - 31.0 mmol/L    Calcium 9.6 8.4 - 10.2 mg/dL    Total Protein 6.6 6.3 - 8.6 g/dL    Albumin 4.00 3.40 - 4.80 g/dL    ALT (SGPT) 23 9 - 52 U/L    AST (SGOT) 15 14 - 36 U/L    Alkaline Phosphatase 102 38 - 126 U/L    Total Bilirubin 0.3 0.2 - 1.3 mg/dL    eGFR Non  Amer 103 51 - 120 mL/min/1.73    Globulin 2.6 2.3 - 3.5 gm/dL    A/G Ratio 1.5 1.1 - 1.8 g/dL    BUN/Creatinine Ratio 21.3 7.0 - 25.0    Anion Gap 9.0 5.0 - 15.0 mmol/L     *Note: Due to a large number of results and/or encounters for the requested time period, some results have not been displayed. A complete set of results can be found in Results Review.       Some portions of this note have been dictated using voice recognition software and may contain errors and/or omissions.

## 2019-07-03 LAB
BASOPHILS # BLD AUTO: 0.05 10*3/MM3 (ref 0–0.2)
BASOPHILS NFR BLD AUTO: 0.8 % (ref 0–1.5)
DEPRECATED RDW RBC AUTO: 43.4 FL (ref 37–54)
EOSINOPHIL # BLD AUTO: 0.22 10*3/MM3 (ref 0–0.4)
EOSINOPHIL NFR BLD AUTO: 3.6 % (ref 0.3–6.2)
ERYTHROCYTE [DISTWIDTH] IN BLOOD BY AUTOMATED COUNT: 12.1 % (ref 12.3–15.4)
HCT VFR BLD AUTO: 38.6 % (ref 34–46.6)
HGB BLD-MCNC: 13 G/DL (ref 12–15.9)
IMM GRANULOCYTES # BLD AUTO: 0.02 10*3/MM3 (ref 0–0.05)
IMM GRANULOCYTES NFR BLD AUTO: 0.3 % (ref 0–0.5)
LYMPHOCYTES # BLD AUTO: 1.9 10*3/MM3 (ref 0.7–3.1)
LYMPHOCYTES NFR BLD AUTO: 31.4 % (ref 19.6–45.3)
MCH RBC QN AUTO: 32.6 PG (ref 26.6–33)
MCHC RBC AUTO-ENTMCNC: 33.7 G/DL (ref 31.5–35.7)
MCV RBC AUTO: 96.7 FL (ref 79–97)
MONOCYTES # BLD AUTO: 0.29 10*3/MM3 (ref 0.1–0.9)
MONOCYTES NFR BLD AUTO: 4.8 % (ref 5–12)
NEUTROPHILS # BLD AUTO: 3.57 10*3/MM3 (ref 1.7–7)
NEUTROPHILS NFR BLD AUTO: 59.1 % (ref 42.7–76)
NRBC BLD AUTO-RTO: 0 /100 WBC (ref 0–0.2)
PLATELET # BLD AUTO: 211 10*3/MM3 (ref 140–450)
PMV BLD AUTO: 12.1 FL (ref 6–12)
RBC # BLD AUTO: 3.99 10*6/MM3 (ref 3.77–5.28)
VIT B12 BLD-MCNC: 550 PG/ML (ref 211–946)
WBC NRBC COR # BLD: 6.05 10*3/MM3 (ref 3.4–10.8)

## 2019-07-05 LAB
HCV RNA SERPL NAA+PROBE-ACNC: NORMAL IU/ML
TEST INFORMATION: NORMAL

## 2019-07-08 ENCOUNTER — TELEPHONE (OUTPATIENT)
Dept: FAMILY MEDICINE CLINIC | Facility: CLINIC | Age: 54
End: 2019-07-08

## 2019-07-08 NOTE — TELEPHONE ENCOUNTER
----- Message from APOLINAR Mooney sent at 7/3/2019 12:16 AM CDT -----  Her labs have remained the same.  Overall they look pretty good.

## 2019-07-30 ENCOUNTER — OFFICE VISIT (OUTPATIENT)
Dept: GASTROENTEROLOGY | Facility: CLINIC | Age: 54
End: 2019-07-30

## 2019-07-30 VITALS
HEART RATE: 81 BPM | HEIGHT: 62 IN | DIASTOLIC BLOOD PRESSURE: 68 MMHG | SYSTOLIC BLOOD PRESSURE: 118 MMHG | WEIGHT: 124 LBS | BODY MASS INDEX: 22.82 KG/M2

## 2019-07-30 DIAGNOSIS — B18.2 CHRONIC HEPATITIS C WITHOUT HEPATIC COMA (HCC): Primary | ICD-10-CM

## 2019-07-30 DIAGNOSIS — R74.8 ELEVATED LIVER ENZYMES: ICD-10-CM

## 2019-07-30 DIAGNOSIS — K74.00 HEPATIC FIBROSIS: ICD-10-CM

## 2019-07-30 PROCEDURE — 99213 OFFICE O/P EST LOW 20 MIN: CPT | Performed by: PHYSICIAN ASSISTANT

## 2019-07-30 NOTE — PATIENT INSTRUCTIONS

## 2019-07-30 NOTE — PROGRESS NOTES
Chief Complaint   Patient presents with   • Hepatitis C   • Elevated Hepatic Enzymes       ENDO PROCEDURE ORDERED:    Subjective    Anh Koch is a 54 y.o. female. she is here today for follow-up.    History of Present Illness    Patient seen on a recheck of her chronic active hepatitis C, elevated liver enzymes, and hepatic fibrosis.  Last seen 07/02/2019.  Genotype A1/F0.  Laboratories from that visit - HCV RNA by PCR quantitative was not detected.  Ferritin 47.  Iron studies were normal.  Normal B12, vitamin D, TSH, cholesterol, A1c and CBC.  CMP showed a sodium of 135, otherwise normal.  Her last ultrasound was at [facility] on 05/21/2019.  Last colonoscopy 02/24/2016.    Patient currently denies abdominal pain, heartburn, nausea or vomiting.  Bowels are moving without blood or mucus.  Weight is stable.     ASSESSMENT AND PLAN:  Patient has negative hepatitis C, slightly less than 12 weeks posttreatment.  She would be considered cured of her hepatitis C.  I recommended followup studies at 6 months to confirm to include an HCV RNA by PCR quantitative, HCV FibroSure, LFTs and INR.  We will pursue further pending clinical course and the results of the above.         The following portions of the patient's history were reviewed and updated as appropriate:   Past Medical History:   Diagnosis Date   • Abnormal liver function    • Anxiety    • Attention deficit hyperactivity disorder (ADHD), combined type 10/23/2017   • Basal cell carcinoma    • Bipolar disorder (CMS/HCC)    • Chronic hepatitis C (CMS/HCC)     1a. HCV Fibrosure .04/F0, necroinflam .12/A0   • Elevated levels of transaminase & lactic acid dehydrogenase    • Hallux valgus with bunions    • Hepatitis C    • Kidney stone    • Low back pain    • Menopausal and female climacteric states 9/11/2017   • Recurrent major depressive disorder, in partial remission (CMS/HCC) 9/11/2017   • Smoker 9/11/2017     Past Surgical History:   Procedure  Laterality Date   • BUNIONECTOMY Right    • COLONOSCOPY  2016    Normal colon.No specimens.   • CYSTOSCOPY W/ URETEROSCOPY W/ LITHOTRIPSY     • PARTIAL HYSTERECTOMY     • TUBAL ABDOMINAL LIGATION       Family History   Problem Relation Age of Onset   • Hypertension Mother    • Diabetes Mother    • Heart attack Father    • Sudden death Father    • Heart disease Father      OB History      Para Term  AB Living    5 2            SAB TAB Ectopic Molar Multiple Live Births                       No Known Allergies  Social History     Socioeconomic History   • Marital status: Single     Spouse name: Not on file   • Number of children: Not on file   • Years of education: Not on file   • Highest education level: Not on file   Tobacco Use   • Smoking status: Current Every Day Smoker     Packs/day: 0.50     Years: 40.00     Pack years: 20.00     Types: Cigarettes     Start date:    • Smokeless tobacco: Never Used   Substance and Sexual Activity   • Drug use: No   • Sexual activity: Not Currently     Current Medications:  Prior to Admission medications    Medication Sig Start Date End Date Taking? Authorizing Provider   amphetamine-dextroamphetamine (ADDERALL) 30 MG tablet Take 1 tablet by mouth 2 (Two) Times a Day. 3/27/19  Yes Bharat Sagastume MD   buPROPion SR (WELLBUTRIN SR) 150 MG 12 hr tablet Take 1 tablet by mouth 2 (Two) Times a Day. 3/27/19  Yes Michelle Dickson APRN   CloNIDine (CATAPRES) 0.1 MG tablet Take 1 tablet by mouth Every Night. 3/27/19  Yes Michelle Dickson APRN   hydrochlorothiazide (HYDRODIURIL) 25 MG tablet Take 1 tablet by mouth Daily. 3/27/19  Yes Michelle Dickson APRN   loratadine (CLARITIN) 10 MG tablet Take 1 tablet by mouth Daily. 3/27/19  Yes Michelle Dickson APRN   naproxen (NAPROSYN) 500 MG tablet Take 1 tablet by mouth 2 (Two) Times a Day With Meals. 3/27/19  Yes Michelle Dickson APRN     Review of Systems  Review of Systems       Objective    /68 (BP Location: Left arm)   Pulse 81  "  Ht 157.5 cm (62\")   Wt 56.2 kg (124 lb)   LMP  (LMP Unknown)   BMI 22.68 kg/m²   Physical Exam   Constitutional: She is oriented to person, place, and time. She appears well-developed and well-nourished. No distress.   Chronically ill   HENT:   Head: Normocephalic and atraumatic.   Eyes: EOM are normal. Pupils are equal, round, and reactive to light.   Neck: Normal range of motion.   Cardiovascular: Normal rate, regular rhythm and normal heart sounds.   Pulmonary/Chest: Effort normal and breath sounds normal.   Abdominal: Soft. Bowel sounds are normal. She exhibits no shifting dullness, no distension, no abdominal bruit, no ascites and no mass. There is no hepatosplenomegaly. There is tenderness. There is no rigidity, no rebound, no guarding and no CVA tenderness. No hernia. Hernia confirmed negative in the ventral area.   Musculoskeletal: Normal range of motion.   Neurological: She is alert and oriented to person, place, and time.   Skin: Skin is warm and dry.   Psychiatric: She has a normal mood and affect. Her behavior is normal. Judgment and thought content normal.   Nursing note and vitals reviewed.    Assessment/Plan      1. Chronic hepatitis C without hepatic coma (CMS/HCC)    2. Elevated liver enzymes    3. Hepatic fibrosis    .   Anh was seen today for hepatitis c and elevated hepatic enzymes.    Diagnoses and all orders for this visit:    Chronic hepatitis C without hepatic coma (CMS/HCC)  -     Hepatitis C RNA, Quantitative, PCR (graph); Future  -     HCV FibroSURE; Future  -     Protime-INR; Future    Elevated liver enzymes  -     Hepatitis C RNA, Quantitative, PCR (graph); Future  -     HCV FibroSURE; Future  -     Protime-INR; Future    Hepatic fibrosis  -     Hepatitis C RNA, Quantitative, PCR (graph); Future  -     HCV FibroSURE; Future  -     Protime-INR; Future        Orders placed during this encounter include:  Orders Placed This Encounter   Procedures   • Hepatitis C RNA, Quantitative, " PCR (graph)     Standing Status:   Future     Standing Expiration Date:   2/10/2020   • HCV FibroSURE     Standing Status:   Future     Standing Expiration Date:   2/10/2020   • Protime-INR     Standing Status:   Future     Standing Expiration Date:   2/10/2020       Medications prescribed:  No orders of the defined types were placed in this encounter.      Requested Prescriptions      No prescriptions requested or ordered in this encounter       Review and/or summary of lab tests, radiology, procedures, medications. Review and summary of old records and obtaining of history. The risks and benefits of my recommendations, as well as other treatment options were discussed with the patient today. Questions were answered.    Follow-up: Return in about 6 months (around 1/30/2020), or if symptoms worsen or fail to improve, for lab prior.     * Surgery not found *      This document has been electronically signed by Abdulaziz Curtis PA-C on July 31, 2019 4:46 PM      Results for orders placed or performed in visit on 07/02/19   Hepatitis C RNA, Quantitative, PCR (graph)   Result Value Ref Range    Hepatitis C Quantitation HCV Not Detected IU/mL    Test Information Comment    Results for orders placed or performed in visit on 03/27/19   CBC Auto Differential   Result Value Ref Range    WBC 6.05 3.40 - 10.80 10*3/mm3    RBC 3.99 3.77 - 5.28 10*6/mm3    Hemoglobin 13.0 12.0 - 15.9 g/dL    Hematocrit 38.6 34.0 - 46.6 %    MCV 96.7 79.0 - 97.0 fL    MCH 32.6 26.6 - 33.0 pg    MCHC 33.7 31.5 - 35.7 g/dL    RDW 12.1 (L) 12.3 - 15.4 %    RDW-SD 43.4 37.0 - 54.0 fl    MPV 12.1 (H) 6.0 - 12.0 fL    Platelets 211 140 - 450 10*3/mm3    Neutrophil % 59.1 42.7 - 76.0 %    Lymphocyte % 31.4 19.6 - 45.3 %    Monocyte % 4.8 (L) 5.0 - 12.0 %    Eosinophil % 3.6 0.3 - 6.2 %    Basophil % 0.8 0.0 - 1.5 %    Immature Grans % 0.3 0.0 - 0.5 %    Neutrophils, Absolute 3.57 1.70 - 7.00 10*3/mm3    Lymphocytes, Absolute 1.90 0.70 - 3.10 10*3/mm3     Monocytes, Absolute 0.29 0.10 - 0.90 10*3/mm3    Eosinophils, Absolute 0.22 0.00 - 0.40 10*3/mm3    Basophils, Absolute 0.05 0.00 - 0.20 10*3/mm3    Immature Grans, Absolute 0.02 0.00 - 0.05 10*3/mm3    nRBC 0.0 0.0 - 0.2 /100 WBC   Iron Profile   Result Value Ref Range    Iron 91 37 - 145 mcg/dL    Iron Saturation 23 20 - 50 %    Transferrin 267 200 - 360 mg/dL    TIBC 398 298 - 536 mcg/dL   Vitamin D 25 Hydroxy   Result Value Ref Range    25 Hydroxy, Vitamin D 48.4 30.0 - 100.0 ng/ml   TSH   Result Value Ref Range    TSH 0.620 0.270 - 4.200 mIU/mL   Hemoglobin A1c   Result Value Ref Range    Hemoglobin A1C 4.90 4.80 - 5.60 %   Ferritin   Result Value Ref Range    Ferritin 47.00 13.00 - 150.00 ng/mL   Vitamin B12   Result Value Ref Range    Vitamin B-12 550 211 - 946 pg/mL   Lipid Panel   Result Value Ref Range    Total Cholesterol 165 0 - 200 mg/dL    Triglycerides 108 0 - 150 mg/dL    HDL Cholesterol 72 (H) 40 - 60 mg/dL    LDL Cholesterol  71 0 - 100 mg/dL    VLDL Cholesterol 21.6 5 - 40 mg/dL    LDL/HDL Ratio 0.99    Comprehensive Metabolic Panel   Result Value Ref Range    Glucose 84 65 - 99 mg/dL    BUN 13 6 - 20 mg/dL    Creatinine 0.62 0.57 - 1.00 mg/dL    Sodium 135 (L) 136 - 145 mmol/L    Potassium 4.3 3.5 - 5.2 mmol/L    Chloride 98 98 - 107 mmol/L    CO2 26.1 22.0 - 29.0 mmol/L    Calcium 9.5 8.6 - 10.5 mg/dL    Total Protein 6.4 6.0 - 8.5 g/dL    Albumin 4.40 3.50 - 5.20 g/dL    ALT (SGPT) 13 1 - 33 U/L    AST (SGOT) 13 1 - 32 U/L    Alkaline Phosphatase 82 39 - 117 U/L    Total Bilirubin 0.3 0.2 - 1.2 mg/dL    eGFR Non African Amer 101 >60 mL/min/1.73    Globulin 2.0 gm/dL    A/G Ratio 2.2 g/dL    BUN/Creatinine Ratio 21.0 7.0 - 25.0    Anion Gap 10.9 5.0 - 15.0 mmol/L   Results for orders placed or performed in visit on 03/18/19   CBC Auto Differential   Result Value Ref Range    WBC 8.22 3.40 - 10.80 10*3/mm3    RBC 4.19 3.77 - 5.28 10*6/mm3    Hemoglobin 13.4 12.0 - 15.9 g/dL    Hematocrit 40.3  34.0 - 46.6 %    MCV 96.2 79.0 - 97.0 fL    MCH 32.0 26.6 - 33.0 pg    MCHC 33.3 31.5 - 35.7 g/dL    RDW 12.4 12.3 - 15.4 %    RDW-SD 43.8 37.0 - 54.0 fl    MPV 10.7 6.0 - 12.0 fL    Platelets 240 140 - 450 10*3/mm3    Neutrophil % 74.3 42.7 - 76.0 %    Lymphocyte % 18.0 (L) 19.6 - 45.3 %    Monocyte % 3.3 (L) 5.0 - 12.0 %    Eosinophil % 3.5 0.3 - 6.2 %    Basophil % 0.7 0.0 - 1.5 %    Immature Grans % 0.2 0.0 - 0.5 %    Neutrophils, Absolute 6.10 1.40 - 7.00 10*3/mm3    Lymphocytes, Absolute 1.48 0.70 - 3.10 10*3/mm3    Monocytes, Absolute 0.27 0.10 - 0.90 10*3/mm3    Eosinophils, Absolute 0.29 0.00 - 0.40 10*3/mm3    Basophils, Absolute 0.06 0.00 - 0.20 10*3/mm3    Immature Grans, Absolute 0.02 0.00 - 0.05 10*3/mm3    nRBC 0.0 0.0 - 0.0 /100 WBC   Hepatitis C RNA, Quantitative, PCR (graph)   Result Value Ref Range    Hepatitis C Quantitation HCV Not Detected IU/mL    Test Information Comment    Comprehensive Metabolic Panel   Result Value Ref Range    Glucose 143 (H) 60 - 100 mg/dL    BUN 13 7 - 21 mg/dL    Creatinine 0.61 0.50 - 1.00 mg/dL    Sodium 132 (L) 137 - 145 mmol/L    Potassium 4.0 3.5 - 5.1 mmol/L    Chloride 95 95 - 110 mmol/L    CO2 28.0 22.0 - 31.0 mmol/L    Calcium 9.6 8.4 - 10.2 mg/dL    Total Protein 6.6 6.3 - 8.6 g/dL    Albumin 4.00 3.40 - 4.80 g/dL    ALT (SGPT) 23 9 - 52 U/L    AST (SGOT) 15 14 - 36 U/L    Alkaline Phosphatase 102 38 - 126 U/L    Total Bilirubin 0.3 0.2 - 1.3 mg/dL    eGFR Non  Amer 103 51 - 120 mL/min/1.73    Globulin 2.6 2.3 - 3.5 gm/dL    A/G Ratio 1.5 1.1 - 1.8 g/dL    BUN/Creatinine Ratio 21.3 7.0 - 25.0    Anion Gap 9.0 5.0 - 15.0 mmol/L     *Note: Due to a large number of results and/or encounters for the requested time period, some results have not been displayed. A complete set of results can be found in Results Review.       Some portions of this note have been dictated using voice recognition software and may contain errors and/or omissions.

## 2020-01-28 ENCOUNTER — APPOINTMENT (OUTPATIENT)
Dept: LAB | Facility: HOSPITAL | Age: 55
End: 2020-01-28

## 2020-01-28 ENCOUNTER — OFFICE VISIT (OUTPATIENT)
Dept: GASTROENTEROLOGY | Facility: CLINIC | Age: 55
End: 2020-01-28

## 2020-01-28 VITALS
DIASTOLIC BLOOD PRESSURE: 92 MMHG | HEIGHT: 62 IN | WEIGHT: 127.8 LBS | HEART RATE: 75 BPM | SYSTOLIC BLOOD PRESSURE: 152 MMHG | BODY MASS INDEX: 23.52 KG/M2

## 2020-01-28 DIAGNOSIS — R74.8 ELEVATED LIVER ENZYMES: ICD-10-CM

## 2020-01-28 DIAGNOSIS — K74.00 HEPATIC FIBROSIS: ICD-10-CM

## 2020-01-28 DIAGNOSIS — B18.2 CHRONIC HEPATITIS C WITHOUT HEPATIC COMA (HCC): Primary | ICD-10-CM

## 2020-01-28 LAB
ALBUMIN SERPL-MCNC: 4.1 G/DL (ref 3.5–5.2)
ALBUMIN/GLOB SERPL: 1.7 G/DL
ALP SERPL-CCNC: 77 U/L (ref 39–117)
ALPHA-FETOPROTEIN: 1.26 NG/ML (ref 0–8.3)
ALT SERPL W P-5'-P-CCNC: 14 U/L (ref 1–33)
ANION GAP SERPL CALCULATED.3IONS-SCNC: 12.8 MMOL/L (ref 5–15)
AST SERPL-CCNC: 13 U/L (ref 1–32)
BASOPHILS # BLD AUTO: 0.04 10*3/MM3 (ref 0–0.2)
BASOPHILS NFR BLD AUTO: 0.4 % (ref 0–1.5)
BILIRUB SERPL-MCNC: 0.3 MG/DL (ref 0.2–1.2)
BUN BLD-MCNC: 9 MG/DL (ref 6–20)
BUN/CREAT SERPL: 15 (ref 7–25)
CALCIUM SPEC-SCNC: 9.1 MG/DL (ref 8.6–10.5)
CHLORIDE SERPL-SCNC: 101 MMOL/L (ref 98–107)
CO2 SERPL-SCNC: 25.2 MMOL/L (ref 22–29)
CREAT BLD-MCNC: 0.6 MG/DL (ref 0.57–1)
DEPRECATED RDW RBC AUTO: 42.8 FL (ref 37–54)
EOSINOPHIL # BLD AUTO: 0.21 10*3/MM3 (ref 0–0.4)
EOSINOPHIL NFR BLD AUTO: 2.3 % (ref 0.3–6.2)
ERYTHROCYTE [DISTWIDTH] IN BLOOD BY AUTOMATED COUNT: 12.2 % (ref 12.3–15.4)
GFR SERPL CREATININE-BSD FRML MDRD: 104 ML/MIN/1.73
GLOBULIN UR ELPH-MCNC: 2.4 GM/DL
GLUCOSE BLD-MCNC: 96 MG/DL (ref 65–99)
HCT VFR BLD AUTO: 39.3 % (ref 34–46.6)
HGB BLD-MCNC: 13.7 G/DL (ref 12–15.9)
IMM GRANULOCYTES # BLD AUTO: 0.02 10*3/MM3 (ref 0–0.05)
IMM GRANULOCYTES NFR BLD AUTO: 0.2 % (ref 0–0.5)
LYMPHOCYTES # BLD AUTO: 1.7 10*3/MM3 (ref 0.7–3.1)
LYMPHOCYTES NFR BLD AUTO: 18.8 % (ref 19.6–45.3)
MCH RBC QN AUTO: 33.5 PG (ref 26.6–33)
MCHC RBC AUTO-ENTMCNC: 34.9 G/DL (ref 31.5–35.7)
MCV RBC AUTO: 96.1 FL (ref 79–97)
MONOCYTES # BLD AUTO: 0.35 10*3/MM3 (ref 0.1–0.9)
MONOCYTES NFR BLD AUTO: 3.9 % (ref 5–12)
NEUTROPHILS # BLD AUTO: 6.73 10*3/MM3 (ref 1.7–7)
NEUTROPHILS NFR BLD AUTO: 74.4 % (ref 42.7–76)
NRBC BLD AUTO-RTO: 0 /100 WBC (ref 0–0.2)
PLATELET # BLD AUTO: 244 10*3/MM3 (ref 140–450)
PMV BLD AUTO: 11.4 FL (ref 6–12)
POTASSIUM BLD-SCNC: 4.3 MMOL/L (ref 3.5–5.2)
PROT SERPL-MCNC: 6.5 G/DL (ref 6–8.5)
RBC # BLD AUTO: 4.09 10*6/MM3 (ref 3.77–5.28)
SODIUM BLD-SCNC: 139 MMOL/L (ref 136–145)
WBC NRBC COR # BLD: 9.05 10*3/MM3 (ref 3.4–10.8)

## 2020-01-28 PROCEDURE — 87522 HEPATITIS C REVRS TRNSCRPJ: CPT | Performed by: PHYSICIAN ASSISTANT

## 2020-01-28 PROCEDURE — 81596 NFCT DS CHRNC HCV 6 ASSAYS: CPT | Performed by: PHYSICIAN ASSISTANT

## 2020-01-28 PROCEDURE — 99213 OFFICE O/P EST LOW 20 MIN: CPT | Performed by: PHYSICIAN ASSISTANT

## 2020-01-28 PROCEDURE — 85610 PROTHROMBIN TIME: CPT | Performed by: PHYSICIAN ASSISTANT

## 2020-01-28 PROCEDURE — 82105 ALPHA-FETOPROTEIN SERUM: CPT | Performed by: PHYSICIAN ASSISTANT

## 2020-01-28 PROCEDURE — 80053 COMPREHEN METABOLIC PANEL: CPT | Performed by: PHYSICIAN ASSISTANT

## 2020-01-28 PROCEDURE — 85025 COMPLETE CBC W/AUTO DIFF WBC: CPT | Performed by: PHYSICIAN ASSISTANT

## 2020-01-29 LAB
INR PPP: 0.87 (ref 0.8–1.2)
PROTHROMBIN TIME: 11.6 SECONDS (ref 11.1–15.3)

## 2020-01-30 LAB
HCV RNA SERPL NAA+PROBE-ACNC: NORMAL IU/ML
TEST INFORMATION: NORMAL

## 2020-01-31 LAB
A2 MACROGLOB SERPL-MCNC: 173 MG/DL (ref 110–276)
ALT SERPL W P-5'-P-CCNC: 14 IU/L (ref 0–40)
APO A-I SERPL-MCNC: 200 MG/DL (ref 116–209)
BILIRUB SERPL-MCNC: 0.3 MG/DL (ref 0–1.2)
FIBROSIS SCORING:: NORMAL
FIBROSIS STAGE SERPL QL: NORMAL
GGT SERPL-CCNC: 11 IU/L (ref 0–60)
HAPTOGLOB SERPL-MCNC: 114 MG/DL (ref 33–346)
HCV AB SER QL: NORMAL
LABORATORY COMMENT REPORT: NORMAL
LIMITATIONS:: NORMAL
LIVER FIBR SCORE SERPL CALC.FIBROSURE: 0.04 (ref 0–0.21)
NECROINFLAMM ACTIVITY SCORING:: NORMAL
NECROINFLAMMATORY ACT GRADE SERPL QL: NORMAL
NECROINFLAMMATORY ACT SCORE SERPL: 0.03 (ref 0–0.17)

## 2020-06-10 DIAGNOSIS — G89.29 CHRONIC BILATERAL LOW BACK PAIN WITHOUT SCIATICA: ICD-10-CM

## 2020-06-10 DIAGNOSIS — M54.50 CHRONIC BILATERAL LOW BACK PAIN WITHOUT SCIATICA: ICD-10-CM

## 2020-06-10 RX ORDER — NAPROXEN 500 MG/1
TABLET ORAL
Qty: 60 TABLET | Refills: 4 | OUTPATIENT
Start: 2020-06-10

## 2020-09-22 ENCOUNTER — OFFICE VISIT (OUTPATIENT)
Dept: GASTROENTEROLOGY | Facility: CLINIC | Age: 55
End: 2020-09-22

## 2020-09-22 VITALS
WEIGHT: 121.6 LBS | HEIGHT: 62 IN | SYSTOLIC BLOOD PRESSURE: 98 MMHG | BODY MASS INDEX: 22.38 KG/M2 | DIASTOLIC BLOOD PRESSURE: 63 MMHG | HEART RATE: 101 BPM

## 2020-09-22 DIAGNOSIS — I10 ESSENTIAL HYPERTENSION: ICD-10-CM

## 2020-09-22 DIAGNOSIS — J30.1 SEASONAL ALLERGIC RHINITIS DUE TO POLLEN: ICD-10-CM

## 2020-09-22 DIAGNOSIS — B18.2 CHRONIC HEPATITIS C WITHOUT HEPATIC COMA (HCC): Primary | ICD-10-CM

## 2020-09-22 DIAGNOSIS — R74.8 ELEVATED LIVER ENZYMES: ICD-10-CM

## 2020-09-22 DIAGNOSIS — N95.1 MENOPAUSAL SYMPTOMS: ICD-10-CM

## 2020-09-22 DIAGNOSIS — G89.29 CHRONIC BILATERAL LOW BACK PAIN WITHOUT SCIATICA: ICD-10-CM

## 2020-09-22 DIAGNOSIS — M54.50 CHRONIC BILATERAL LOW BACK PAIN WITHOUT SCIATICA: ICD-10-CM

## 2020-09-22 PROCEDURE — 99213 OFFICE O/P EST LOW 20 MIN: CPT | Performed by: PHYSICIAN ASSISTANT

## 2020-09-22 RX ORDER — BUPROPION HYDROCHLORIDE 150 MG/1
150 TABLET, EXTENDED RELEASE ORAL 2 TIMES DAILY
Qty: 60 TABLET | Refills: 0 | Status: SHIPPED | OUTPATIENT
Start: 2020-09-22 | End: 2021-06-15 | Stop reason: ALTCHOICE

## 2020-09-22 RX ORDER — HYDROCHLOROTHIAZIDE 25 MG/1
25 TABLET ORAL DAILY
Qty: 30 TABLET | Refills: 0 | Status: SHIPPED | OUTPATIENT
Start: 2020-09-22

## 2020-09-22 RX ORDER — CLONIDINE HYDROCHLORIDE 0.1 MG/1
0.1 TABLET ORAL NIGHTLY
Qty: 30 TABLET | Refills: 0 | Status: SHIPPED | OUTPATIENT
Start: 2020-09-22

## 2020-09-22 RX ORDER — LORATADINE 10 MG/1
10 TABLET ORAL DAILY
Qty: 30 TABLET | Refills: 0 | Status: SHIPPED | OUTPATIENT
Start: 2020-09-22

## 2020-09-22 RX ORDER — NAPROXEN 500 MG/1
500 TABLET ORAL 2 TIMES DAILY WITH MEALS
Qty: 60 TABLET | Refills: 0 | Status: SHIPPED | OUTPATIENT
Start: 2020-09-22

## 2020-09-22 RX ORDER — TRAZODONE HYDROCHLORIDE 50 MG/1
50 TABLET ORAL NIGHTLY
Qty: 30 TABLET | Refills: 0 | Status: SHIPPED | OUTPATIENT
Start: 2020-09-22 | End: 2021-06-15 | Stop reason: ALTCHOICE

## 2020-09-22 NOTE — PATIENT INSTRUCTIONS

## 2020-09-22 NOTE — PROGRESS NOTES
Chief Complaint   Patient presents with   • Hepatitis C   • Hepatic Fibrosis   • Elevated Hepatic Enzymes       ENDO PROCEDURE ORDERED:    Subjective    Anh Koch is a 55 y.o. female. she is here today for follow-up.    History of Present Illness    Patient seen on a recheck of her hepatic fibrosis, elevated liver enzymes, hepatitis C. Last seen 01/28/2020. Genotype 1A/F0. She did not return for a follow-up after that visit. She has had a problem with noncompliance in the past. She did get her laboratories on that date, showed normal INR, HCV RNA by PCR quantitative, was not detectable. AST, CBC, CMP were normal. HCV FibroSure 0.04/F0, inflammation 0.03/A0.     Patient currently denies tobacco, alcohol or illicit substance use. Bowels are moving without blood or mucus. Weight is down 6 pounds since last visit. Last colonoscopy was normal on 02/04/2016, she does not have family history.     A/P: Patient with epigastric tenderness. Encouraged to avoid gastric irritants. She will be due for colonoscopy at 10 years. She states she needs to get in to see her primary care but asked me to refill some of her chronic medications. I told her I will not give her more than 1 month supply, she needs to try to get back into her family doctor. She claims she is still established with Dr. Waters. Will plan follow-up as needed.         The following portions of the patient's history were reviewed and updated as appropriate:   Past Medical History:   Diagnosis Date   • Abnormal liver function    • Anxiety    • Attention deficit hyperactivity disorder (ADHD), combined type 10/23/2017   • Basal cell carcinoma    • Bipolar disorder (CMS/HCC)    • Chronic hepatitis C (CMS/HCC)     1a. HCV Fibrosure .04/F0, necroinflam .12/A0   • Elevated levels of transaminase & lactic acid dehydrogenase    • Hallux valgus with bunions    • Hepatitis C    • Kidney stone    • Low back pain    • Menopausal and female climacteric states 9/11/2017   •  Recurrent major depressive disorder, in partial remission (CMS/[facility]) 2017   • Smoker 2017     Past Surgical History:   Procedure Laterality Date   • BUNIONECTOMY Right    • COLONOSCOPY  2016    Normal colon.No specimens.   • CYSTOSCOPY W/ URETEROSCOPY W/ LITHOTRIPSY     • SUBTOTAL HYSTERECTOMY     • TUBAL ABDOMINAL LIGATION       Family History   Problem Relation Age of Onset   • Hypertension Mother    • Diabetes Mother    • Heart attack Father    • Sudden death Father    • Heart disease Father      OB History        5    Para   2    Term                AB        Living           SAB        TAB        Ectopic        Molar        Multiple        Live Births                  No Known Allergies  Social History     Socioeconomic History   • Marital status: Single     Spouse name: Not on file   • Number of children: Not on file   • Years of education: Not on file   • Highest education level: Not on file   Tobacco Use   • Smoking status: Current Every Day Smoker     Packs/day: 0.50     Years: 40.00     Pack years: 20.00     Types: Cigarettes     Start date:    • Smokeless tobacco: Never Used   Substance and Sexual Activity   • Alcohol use: Not Currently     Alcohol/week: 3.0 standard drinks     Types: 1 Glasses of wine, 1 Cans of beer, 1 Shots of liquor per week     Frequency: Monthly or less     Comment: Ocassionally   • Drug use: No   • Sexual activity: Not Currently     Current Medications:  Prior to Admission medications    Medication Sig Start Date End Date Taking? Authorizing Provider   buPROPion SR (WELLBUTRIN SR) 150 MG 12 hr tablet Take 1 tablet by mouth 2 (Two) Times a Day. 3/27/19  Yes Michelle Dickson APRN   CloNIDine (CATAPRES) 0.1 MG tablet Take 1 tablet by mouth Every Night. 3/27/19  Yes Michelle Dickson APRN   hydrochlorothiazide (HYDRODIURIL) 25 MG tablet Take 1 tablet by mouth Daily. 3/27/19  Yes Michelle Dickson APRN   loratadine (CLARITIN) 10 MG tablet Take 1 tablet by mouth  "Daily. 3/27/19  Yes Michelle Dickson APRN   naproxen (NAPROSYN) 500 MG tablet Take 1 tablet by mouth 2 (Two) Times a Day With Meals. 3/27/19  Yes Michelle Dickson APRN   sertraline (ZOLOFT) 50 MG tablet Take 50 mg by mouth Daily. 7/11/20  Yes Provider, MD Enio   amphetamine-dextroamphetamine (ADDERALL) 30 MG tablet Take 1 tablet by mouth 2 (Two) Times a Day. 3/27/19   Bharat Sagastume MD     Review of Systems  Review of Systems       Objective    BP 98/63   Pulse 101   Ht 157.5 cm (62\")   Wt 55.2 kg (121 lb 9.6 oz)   LMP  (LMP Unknown)   BMI 22.24 kg/m²   Physical Exam  Vitals signs and nursing note reviewed.   Constitutional:       General: She is not in acute distress.     Appearance: She is well-developed.   HENT:      Head: Normocephalic and atraumatic.   Eyes:      Pupils: Pupils are equal, round, and reactive to light.   Neck:      Musculoskeletal: Normal range of motion.   Cardiovascular:      Rate and Rhythm: Normal rate and regular rhythm.      Heart sounds: Normal heart sounds.   Pulmonary:      Effort: Pulmonary effort is normal.      Breath sounds: Normal breath sounds.   Abdominal:      General: Bowel sounds are normal. There is no distension or abdominal bruit.      Palpations: Abdomen is soft. Abdomen is not rigid. There is no shifting dullness or mass.      Tenderness: There is abdominal tenderness. There is no guarding or rebound.      Hernia: No hernia is present. There is no hernia in the ventral area.      Comments: mild   Musculoskeletal: Normal range of motion.   Skin:     General: Skin is warm and dry.   Neurological:      Mental Status: She is alert and oriented to person, place, and time.   Psychiatric:         Behavior: Behavior normal.         Thought Content: Thought content normal.         Judgment: Judgment normal.       Assessment/Plan      1. Chronic hepatitis C without hepatic coma (CMS/HCC)    2. Elevated liver enzymes    3. Chronic bilateral low back pain without sciatica    4. " Seasonal allergic rhinitis due to pollen    5. Essential hypertension    6. Menopausal symptoms    .   Anh was seen today for hepatitis c, hepatic fibrosis and elevated hepatic enzymes.    Diagnoses and all orders for this visit:    Chronic hepatitis C without hepatic coma (CMS/HCC)    Elevated liver enzymes    Chronic bilateral low back pain without sciatica  -     naproxen (Naprosyn) 500 MG tablet; Take 1 tablet by mouth 2 (Two) Times a Day With Meals.    Seasonal allergic rhinitis due to pollen  -     loratadine (Claritin) 10 MG tablet; Take 1 tablet by mouth Daily.    Essential hypertension  -     hydroCHLOROthiazide (HYDRODIURIL) 25 MG tablet; Take 1 tablet by mouth Daily.    Menopausal symptoms  -     cloNIDine (CATAPRES) 0.1 MG tablet; Take 1 tablet by mouth Every Night.  -     buPROPion SR (Wellbutrin SR) 150 MG 12 hr tablet; Take 1 tablet by mouth 2 (Two) Times a Day.    Other orders  -     sertraline (ZOLOFT) 50 MG tablet; Take 1 tablet by mouth Daily.  -     traZODone (DESYREL) 50 MG tablet; Take 1 tablet by mouth Every Night.        Orders placed during this encounter include:  No orders of the defined types were placed in this encounter.      Medications prescribed:  New Medications Ordered This Visit   Medications   • sertraline (ZOLOFT) 50 MG tablet     Sig: Take 1 tablet by mouth Daily.     Dispense:  30 tablet     Refill:  0   • naproxen (Naprosyn) 500 MG tablet     Sig: Take 1 tablet by mouth 2 (Two) Times a Day With Meals.     Dispense:  60 tablet     Refill:  0   • loratadine (Claritin) 10 MG tablet     Sig: Take 1 tablet by mouth Daily.     Dispense:  30 tablet     Refill:  0   • hydroCHLOROthiazide (HYDRODIURIL) 25 MG tablet     Sig: Take 1 tablet by mouth Daily.     Dispense:  30 tablet     Refill:  0     03/08/2019 1:55:46 PM   • cloNIDine (CATAPRES) 0.1 MG tablet     Sig: Take 1 tablet by mouth Every Night.     Dispense:  30 tablet     Refill:  0   • buPROPion SR (Wellbutrin SR) 150 MG 12  hr tablet     Sig: Take 1 tablet by mouth 2 (Two) Times a Day.     Dispense:  60 tablet     Refill:  0   • traZODone (DESYREL) 50 MG tablet     Sig: Take 1 tablet by mouth Every Night.     Dispense:  30 tablet     Refill:  0       Requested Prescriptions     Signed Prescriptions Disp Refills   • sertraline (ZOLOFT) 50 MG tablet 30 tablet 0     Sig: Take 1 tablet by mouth Daily.   • naproxen (Naprosyn) 500 MG tablet 60 tablet 0     Sig: Take 1 tablet by mouth 2 (Two) Times a Day With Meals.   • loratadine (Claritin) 10 MG tablet 30 tablet 0     Sig: Take 1 tablet by mouth Daily.   • hydroCHLOROthiazide (HYDRODIURIL) 25 MG tablet 30 tablet 0     Sig: Take 1 tablet by mouth Daily.   • cloNIDine (CATAPRES) 0.1 MG tablet 30 tablet 0     Sig: Take 1 tablet by mouth Every Night.   • buPROPion SR (Wellbutrin SR) 150 MG 12 hr tablet 60 tablet 0     Sig: Take 1 tablet by mouth 2 (Two) Times a Day.   • traZODone (DESYREL) 50 MG tablet 30 tablet 0     Sig: Take 1 tablet by mouth Every Night.       Review and/or summary of lab tests, radiology, procedures, medications. Review and summary of old records and obtaining of history. The risks and benefits of my recommendations, as well as other treatment options were discussed with the patient today. Questions were answered.    Follow-up: Return if symptoms worsen or fail to improve, for Next scheduled follow up.     * Surgery not found *      This document has been electronically signed by Abdulaziz Curtis PA-C on September 28, 2020 18:10 CDT      Results for orders placed or performed in visit on 01/28/20   HCV RT-PCR,Quant(Non-Graph)    Specimen: Blood   Result Value Ref Range    Hepatitis C Quantitation HCV Not Detected IU/mL    Test Information Comment    HCV FibroSURE    Specimen: Blood   Result Value Ref Range    Fibrosis Score 0.04 0.00 - 0.21    Fibrosis Stage Comment     Necroinflammat Activity Score 0.03 0.00 - 0.17    Necroinflammat Activity Grade A0-No activity      Alpha 2-Macroglobulins, Qn 173 110 - 276 mg/dL    Haptoglobin 114 33 - 346 mg/dL    Apolipoprotein A-1 200 116 - 209 mg/dL    Total Bilirubin 0.3 0.0 - 1.2 mg/dL    GGT 11 0 - 60 IU/L    ALT (SGPT) 14 0 - 40 IU/L    HCV Qual Interp Comment     Fibrosis Scoring: Comment     Necroinflamm Activity Scoring: Comment     Limitations: Comment     Comment Comment    CBC Auto Differential    Specimen: Blood   Result Value Ref Range    WBC 9.05 3.40 - 10.80 10*3/mm3    RBC 4.09 3.77 - 5.28 10*6/mm3    Hemoglobin 13.7 12.0 - 15.9 g/dL    Hematocrit 39.3 34.0 - 46.6 %    MCV 96.1 79.0 - 97.0 fL    MCH 33.5 (H) 26.6 - 33.0 pg    MCHC 34.9 31.5 - 35.7 g/dL    RDW 12.2 (L) 12.3 - 15.4 %    RDW-SD 42.8 37.0 - 54.0 fl    MPV 11.4 6.0 - 12.0 fL    Platelets 244 140 - 450 10*3/mm3    Neutrophil % 74.4 42.7 - 76.0 %    Lymphocyte % 18.8 (L) 19.6 - 45.3 %    Monocyte % 3.9 (L) 5.0 - 12.0 %    Eosinophil % 2.3 0.3 - 6.2 %    Basophil % 0.4 0.0 - 1.5 %    Immature Grans % 0.2 0.0 - 0.5 %    Neutrophils, Absolute 6.73 1.70 - 7.00 10*3/mm3    Lymphocytes, Absolute 1.70 0.70 - 3.10 10*3/mm3    Monocytes, Absolute 0.35 0.10 - 0.90 10*3/mm3    Eosinophils, Absolute 0.21 0.00 - 0.40 10*3/mm3    Basophils, Absolute 0.04 0.00 - 0.20 10*3/mm3    Immature Grans, Absolute 0.02 0.00 - 0.05 10*3/mm3    nRBC 0.0 0.0 - 0.2 /100 WBC   AFP Tumor Marker    Specimen: Blood   Result Value Ref Range    ALPHA-FETOPROTEIN 1.26 0 - 8.3 ng/mL   Protime-INR    Specimen: Blood   Result Value Ref Range    Protime 11.6 11.1 - 15.3 Seconds    INR 0.87 0.80 - 1.20   Comprehensive Metabolic Panel    Specimen: Blood   Result Value Ref Range    Glucose 96 65 - 99 mg/dL    BUN 9 6 - 20 mg/dL    Creatinine 0.60 0.57 - 1.00 mg/dL    Sodium 139 136 - 145 mmol/L    Potassium 4.3 3.5 - 5.2 mmol/L    Chloride 101 98 - 107 mmol/L    CO2 25.2 22.0 - 29.0 mmol/L    Calcium 9.1 8.6 - 10.5 mg/dL    Total Protein 6.5 6.0 - 8.5 g/dL    Albumin 4.10 3.50 - 5.20 g/dL    ALT (SGPT) 14 1 -  33 U/L    AST (SGOT) 13 1 - 32 U/L    Alkaline Phosphatase 77 39 - 117 U/L    Total Bilirubin 0.3 0.2 - 1.2 mg/dL    eGFR Non African Amer 104 >60 mL/min/1.73    Globulin 2.4 gm/dL    A/G Ratio 1.7 g/dL    BUN/Creatinine Ratio 15.0 7.0 - 25.0    Anion Gap 12.8 5.0 - 15.0 mmol/L   Results for orders placed or performed in visit on 07/02/19   Hepatitis C RNA, Quantitative, PCR (graph)    Specimen: Blood   Result Value Ref Range    Hepatitis C Quantitation HCV Not Detected IU/mL    Test Information Comment    Results for orders placed or performed in visit on 03/27/19   CBC Auto Differential    Specimen: Blood   Result Value Ref Range    WBC 6.05 3.40 - 10.80 10*3/mm3    RBC 3.99 3.77 - 5.28 10*6/mm3    Hemoglobin 13.0 12.0 - 15.9 g/dL    Hematocrit 38.6 34.0 - 46.6 %    MCV 96.7 79.0 - 97.0 fL    MCH 32.6 26.6 - 33.0 pg    MCHC 33.7 31.5 - 35.7 g/dL    RDW 12.1 (L) 12.3 - 15.4 %    RDW-SD 43.4 37.0 - 54.0 fl    MPV 12.1 (H) 6.0 - 12.0 fL    Platelets 211 140 - 450 10*3/mm3    Neutrophil % 59.1 42.7 - 76.0 %    Lymphocyte % 31.4 19.6 - 45.3 %    Monocyte % 4.8 (L) 5.0 - 12.0 %    Eosinophil % 3.6 0.3 - 6.2 %    Basophil % 0.8 0.0 - 1.5 %    Immature Grans % 0.3 0.0 - 0.5 %    Neutrophils, Absolute 3.57 1.70 - 7.00 10*3/mm3    Lymphocytes, Absolute 1.90 0.70 - 3.10 10*3/mm3    Monocytes, Absolute 0.29 0.10 - 0.90 10*3/mm3    Eosinophils, Absolute 0.22 0.00 - 0.40 10*3/mm3    Basophils, Absolute 0.05 0.00 - 0.20 10*3/mm3    Immature Grans, Absolute 0.02 0.00 - 0.05 10*3/mm3    nRBC 0.0 0.0 - 0.2 /100 WBC   Iron Profile    Specimen: Blood   Result Value Ref Range    Iron 91 37 - 145 mcg/dL    Iron Saturation 23 20 - 50 %    Transferrin 267 200 - 360 mg/dL    TIBC 398 298 - 536 mcg/dL   Vitamin D 25 Hydroxy    Specimen: Blood   Result Value Ref Range    25 Hydroxy, Vitamin D 48.4 30.0 - 100.0 ng/ml   TSH    Specimen: Blood   Result Value Ref Range    TSH 0.620 0.270 - 4.200 mIU/mL   Hemoglobin A1c    Specimen: Blood    Result Value Ref Range    Hemoglobin A1C 4.90 4.80 - 5.60 %   Ferritin    Specimen: Blood   Result Value Ref Range    Ferritin 47.00 13.00 - 150.00 ng/mL   Vitamin B12    Specimen: Blood   Result Value Ref Range    Vitamin B-12 550 211 - 946 pg/mL   Lipid Panel    Specimen: Blood   Result Value Ref Range    Total Cholesterol 165 0 - 200 mg/dL    Triglycerides 108 0 - 150 mg/dL    HDL Cholesterol 72 (H) 40 - 60 mg/dL    LDL Cholesterol  71 0 - 100 mg/dL    VLDL Cholesterol 21.6 5 - 40 mg/dL    LDL/HDL Ratio 0.99      *Note: Due to a large number of results and/or encounters for the requested time period, some results have not been displayed. A complete set of results can be found in Results Review.       Some portions of this note have been dictated using voice recognition software and may contain errors and/or omissions.

## 2021-06-14 ENCOUNTER — OFFICE VISIT (OUTPATIENT)
Dept: GASTROENTEROLOGY | Facility: CLINIC | Age: 56
End: 2021-06-14

## 2021-06-14 VITALS
SYSTOLIC BLOOD PRESSURE: 121 MMHG | DIASTOLIC BLOOD PRESSURE: 64 MMHG | BODY MASS INDEX: 24.03 KG/M2 | HEART RATE: 76 BPM | HEIGHT: 62 IN | WEIGHT: 130.6 LBS

## 2021-06-14 DIAGNOSIS — B18.2 CHRONIC HEPATITIS C WITHOUT HEPATIC COMA (HCC): ICD-10-CM

## 2021-06-14 DIAGNOSIS — K74.00 HEPATIC FIBROSIS: ICD-10-CM

## 2021-06-14 DIAGNOSIS — R19.7 DIARRHEA, UNSPECIFIED TYPE: Primary | ICD-10-CM

## 2021-06-14 DIAGNOSIS — R15.9 INCONTINENCE OF FECES, UNSPECIFIED FECAL INCONTINENCE TYPE: ICD-10-CM

## 2021-06-14 PROCEDURE — 99213 OFFICE O/P EST LOW 20 MIN: CPT | Performed by: PHYSICIAN ASSISTANT

## 2021-06-14 RX ORDER — DEXTROSE AND SODIUM CHLORIDE 5; .45 G/100ML; G/100ML
30 INJECTION, SOLUTION INTRAVENOUS CONTINUOUS PRN
Status: CANCELLED | OUTPATIENT
Start: 2021-07-20

## 2021-06-14 NOTE — PROGRESS NOTES
Chief Complaint   Patient presents with   • Hepatitis C       ENDO PROCEDURE ORDERED: COLON diarrhea, incontinence    Subjective    Anh Koch is a 56 y.o. female. she is here today for follow-up.    History of Present Illness    Patient presents for follow-up on her hepatitis C, diarrhea. Last seen 09/22/2020. She was an F0 at that time. She currently denies abdominal pain, heartburn, nausea, vomiting, dysphagia. She is mainly concerned that she is having difficulty controlling her bowels. They are loose but she has not seen any blood or mucus in them. Weight is up 9 pounds since last visit. Last colonoscopy 02/24/2016.     After the patient left we were able to get recent laboratories from Dr. Boyd. On 04/21/2021 cholesterol 207, , CMP, vitamin D, urinalysis, TSH, A1c, CBC were all fairly normal or negative.     A/P: Patient with increasing diarrhea, abdominal discomfort. It has been more than 5 years since her last colonoscopy. I did recommend colonoscopy, will do biopsies and stool studies. She may need further medications to treat. Will plan follow-up after the above, further pending clinical course and the results of the above.        The following portions of the patient's history were reviewed and updated as appropriate:   Past Medical History:   Diagnosis Date   • Abnormal liver function    • Anxiety    • Attention deficit hyperactivity disorder (ADHD), combined type 10/23/2017   • Basal cell carcinoma    • Bipolar disorder (CMS/HCC)    • Chronic hepatitis C (CMS/HCC)     1a. HCV Fibrosure .04/F0, necroinflam .12/A0   • Elevated levels of transaminase & lactic acid dehydrogenase    • Hallux valgus with bunions    • Hepatitis C    • Kidney stone    • Low back pain    • Menopausal and female climacteric states 9/11/2017   • Recurrent major depressive disorder, in partial remission (CMS/HCC) 9/11/2017   • Smoker 9/11/2017     Past Surgical History:   Procedure Laterality Date   • BUNIONECTOMY Right     • COLONOSCOPY  2016    Normal colon.No specimens.   • CYSTOSCOPY W/ URETEROSCOPY W/ LITHOTRIPSY     • SUBTOTAL HYSTERECTOMY     • TUBAL ABDOMINAL LIGATION       Family History   Problem Relation Age of Onset   • Hypertension Mother    • Diabetes Mother    • Heart attack Father    • Sudden death Father    • Heart disease Father      OB History        5    Para   2    Term                AB        Living           SAB        TAB        Ectopic        Molar        Multiple        Live Births                  No Known Allergies  Social History     Socioeconomic History   • Marital status: Single     Spouse name: Not on file   • Number of children: Not on file   • Years of education: Not on file   • Highest education level: Not on file   Tobacco Use   • Smoking status: Current Every Day Smoker     Packs/day: 0.50     Years: 40.00     Pack years: 20.00     Types: Cigarettes     Start date:    • Smokeless tobacco: Never Used   Vaping Use   • Vaping Use: Never used   Substance and Sexual Activity   • Alcohol use: Not Currently     Alcohol/week: 3.0 standard drinks     Types: 1 Glasses of wine, 1 Cans of beer, 1 Shots of liquor per week     Comment: Ocassionally   • Drug use: No   • Sexual activity: Not Currently     Current Medications:  Prior to Admission medications    Medication Sig Start Date End Date Taking? Authorizing Provider   amphetamine-dextroamphetamine (ADDERALL) 30 MG tablet Take 1 tablet by mouth 2 (Two) Times a Day. 3/27/19   Bharat Sagastume MD   buPROPion SR (Wellbutrin SR) 150 MG 12 hr tablet Take 1 tablet by mouth 2 (Two) Times a Day. 20   Abdulaziz Curtis PA-C   cloNIDine (CATAPRES) 0.1 MG tablet Take 1 tablet by mouth Every Night. 20   Abdulaziz Curtis PA-C   hydroCHLOROthiazide (HYDRODIURIL) 25 MG tablet Take 1 tablet by mouth Daily. 20   Abdulaziz Curtis PA-C   loratadine (Claritin) 10 MG tablet Take 1 tablet by mouth Daily. 20   Abdulaziz Curtis PA-C  "  naproxen (Naprosyn) 500 MG tablet Take 1 tablet by mouth 2 (Two) Times a Day With Meals. 9/22/20   Abdulaziz Curtis PA-C   sertraline (ZOLOFT) 50 MG tablet Take 1 tablet by mouth Daily. 9/22/20   Abdulaziz Curtis PA-C   traZODone (DESYREL) 50 MG tablet Take 1 tablet by mouth Every Night. 9/22/20   Abdulaziz Curtis PA-C     Review of Systems  Review of Systems   HENT: Negative for trouble swallowing.    Gastrointestinal: Positive for abdominal pain and diarrhea. Negative for anal bleeding, blood in stool, constipation, nausea, rectal pain and vomiting.          Objective    /64   Pulse 76   Ht 157.5 cm (62\")   Wt 59.2 kg (130 lb 9.6 oz)   LMP  (LMP Unknown)   BMI 23.89 kg/m²   Physical Exam  Vitals and nursing note reviewed.   Constitutional:       General: She is not in acute distress.     Appearance: She is well-developed.   HENT:      Head: Normocephalic and atraumatic.   Eyes:      Pupils: Pupils are equal, round, and reactive to light.   Cardiovascular:      Rate and Rhythm: Normal rate and regular rhythm.      Heart sounds: Normal heart sounds.   Pulmonary:      Effort: Pulmonary effort is normal.      Breath sounds: Normal breath sounds.   Abdominal:      General: Bowel sounds are normal. There is no distension or abdominal bruit.      Palpations: Abdomen is soft. Abdomen is not rigid. There is no shifting dullness or mass.      Tenderness: There is abdominal tenderness. There is no guarding or rebound.      Hernia: No hernia is present. There is no hernia in the ventral area.      Comments: mild   Musculoskeletal:         General: Normal range of motion.      Cervical back: Normal range of motion.   Skin:     General: Skin is warm and dry.   Neurological:      Mental Status: She is alert and oriented to person, place, and time.   Psychiatric:         Behavior: Behavior normal.         Thought Content: Thought content normal.         Judgment: Judgment normal.       Assessment/Plan      1. " Diarrhea, unspecified type    2. Chronic hepatitis C without hepatic coma (CMS/HCC)    3. Hepatic fibrosis    4. Incontinence of feces, unspecified fecal incontinence type    .   Diagnoses and all orders for this visit:    1. Diarrhea, unspecified type (Primary)  -     Case Request; Standing  -     dextrose 5 % and sodium chloride 0.45 % infusion  -     Case Request    2. Chronic hepatitis C without hepatic coma (CMS/HCC)    3. Hepatic fibrosis    4. Incontinence of feces, unspecified fecal incontinence type  -     Case Request; Standing  -     dextrose 5 % and sodium chloride 0.45 % infusion  -     Case Request    Other orders  -     Follow Anesthesia Guidelines / Standing Orders; Future  -     Obtain Informed Consent; Future  -     Obtain Informed Consent; Standing  -     POC Glucose Once; Standing        Orders placed during this encounter include:  Orders Placed This Encounter   Procedures   • Follow Anesthesia Guidelines / Standing Orders     Standing Status:   Future   • Obtain Informed Consent     Standing Status:   Future     Order Specific Question:   Informed Consent Given For     Answer:   COLONOSCOPY       Medications prescribed:  No orders of the defined types were placed in this encounter.      Requested Prescriptions      No prescriptions requested or ordered in this encounter       Review and/or summary of lab tests, radiology, procedures, medications. Review and summary of old records and obtaining of history. The risks and benefits of my recommendations, as well as other treatment options were discussed with the patient today. Questions were answered.    Follow-up: Return in about 1 month (around 7/14/2021), or if symptoms worsen or fail to improve.     COLONOSCOPY (N/A)      This document has been electronically signed by Abdulaziz Curtis PA-C on June 24, 2021 18:45 CDT      Results for orders placed or performed in visit on 01/28/20   HCV RT-PCR,Quant(Non-Graph)    Specimen: Blood   Result Value  Ref Range    Hepatitis C Quantitation HCV Not Detected IU/mL    Test Information Comment    HCV FibroSURE    Specimen: Blood   Result Value Ref Range    Fibrosis Score 0.04 0.00 - 0.21    Fibrosis Stage Comment     Necroinflammat Activity Score 0.03 0.00 - 0.17    Necroinflammat Activity Grade A0-No activity     Alpha 2-Macroglobulins, Qn 173 110 - 276 mg/dL    Haptoglobin 114 33 - 346 mg/dL    Apolipoprotein A-1 200 116 - 209 mg/dL    Total Bilirubin 0.3 0.0 - 1.2 mg/dL    GGT 11 0 - 60 IU/L    ALT (SGPT) 14 0 - 40 IU/L    HCV Qual Interp Comment     Fibrosis Scoring: Comment     Necroinflamm Activity Scoring: Comment     Limitations: Comment     Comment Comment    CBC Auto Differential    Specimen: Blood   Result Value Ref Range    WBC 9.05 3.40 - 10.80 10*3/mm3    RBC 4.09 3.77 - 5.28 10*6/mm3    Hemoglobin 13.7 12.0 - 15.9 g/dL    Hematocrit 39.3 34.0 - 46.6 %    MCV 96.1 79.0 - 97.0 fL    MCH 33.5 (H) 26.6 - 33.0 pg    MCHC 34.9 31.5 - 35.7 g/dL    RDW 12.2 (L) 12.3 - 15.4 %    RDW-SD 42.8 37.0 - 54.0 fl    MPV 11.4 6.0 - 12.0 fL    Platelets 244 140 - 450 10*3/mm3    Neutrophil % 74.4 42.7 - 76.0 %    Lymphocyte % 18.8 (L) 19.6 - 45.3 %    Monocyte % 3.9 (L) 5.0 - 12.0 %    Eosinophil % 2.3 0.3 - 6.2 %    Basophil % 0.4 0.0 - 1.5 %    Immature Grans % 0.2 0.0 - 0.5 %    Neutrophils, Absolute 6.73 1.70 - 7.00 10*3/mm3    Lymphocytes, Absolute 1.70 0.70 - 3.10 10*3/mm3    Monocytes, Absolute 0.35 0.10 - 0.90 10*3/mm3    Eosinophils, Absolute 0.21 0.00 - 0.40 10*3/mm3    Basophils, Absolute 0.04 0.00 - 0.20 10*3/mm3    Immature Grans, Absolute 0.02 0.00 - 0.05 10*3/mm3    nRBC 0.0 0.0 - 0.2 /100 WBC   AFP Tumor Marker    Specimen: Blood   Result Value Ref Range    ALPHA-FETOPROTEIN 1.26 0 - 8.3 ng/mL   Protime-INR    Specimen: Blood   Result Value Ref Range    Protime 11.6 11.1 - 15.3 Seconds    INR 0.87 0.80 - 1.20   Comprehensive Metabolic Panel    Specimen: Blood   Result Value Ref Range    Glucose 96 65 - 99  mg/dL    BUN 9 6 - 20 mg/dL    Creatinine 0.60 0.57 - 1.00 mg/dL    Sodium 139 136 - 145 mmol/L    Potassium 4.3 3.5 - 5.2 mmol/L    Chloride 101 98 - 107 mmol/L    CO2 25.2 22.0 - 29.0 mmol/L    Calcium 9.1 8.6 - 10.5 mg/dL    Total Protein 6.5 6.0 - 8.5 g/dL    Albumin 4.10 3.50 - 5.20 g/dL    ALT (SGPT) 14 1 - 33 U/L    AST (SGOT) 13 1 - 32 U/L    Alkaline Phosphatase 77 39 - 117 U/L    Total Bilirubin 0.3 0.2 - 1.2 mg/dL    eGFR Non African Amer 104 >60 mL/min/1.73    Globulin 2.4 gm/dL    A/G Ratio 1.7 g/dL    BUN/Creatinine Ratio 15.0 7.0 - 25.0    Anion Gap 12.8 5.0 - 15.0 mmol/L   Results for orders placed or performed in visit on 07/02/19   Hepatitis C RNA, Quantitative, PCR (graph)    Specimen: Blood   Result Value Ref Range    Hepatitis C Quantitation HCV Not Detected IU/mL    Test Information Comment    Results for orders placed or performed in visit on 03/27/19   CBC Auto Differential    Specimen: Blood   Result Value Ref Range    WBC 6.05 3.40 - 10.80 10*3/mm3    RBC 3.99 3.77 - 5.28 10*6/mm3    Hemoglobin 13.0 12.0 - 15.9 g/dL    Hematocrit 38.6 34.0 - 46.6 %    MCV 96.7 79.0 - 97.0 fL    MCH 32.6 26.6 - 33.0 pg    MCHC 33.7 31.5 - 35.7 g/dL    RDW 12.1 (L) 12.3 - 15.4 %    RDW-SD 43.4 37.0 - 54.0 fl    MPV 12.1 (H) 6.0 - 12.0 fL    Platelets 211 140 - 450 10*3/mm3    Neutrophil % 59.1 42.7 - 76.0 %    Lymphocyte % 31.4 19.6 - 45.3 %    Monocyte % 4.8 (L) 5.0 - 12.0 %    Eosinophil % 3.6 0.3 - 6.2 %    Basophil % 0.8 0.0 - 1.5 %    Immature Grans % 0.3 0.0 - 0.5 %    Neutrophils, Absolute 3.57 1.70 - 7.00 10*3/mm3    Lymphocytes, Absolute 1.90 0.70 - 3.10 10*3/mm3    Monocytes, Absolute 0.29 0.10 - 0.90 10*3/mm3    Eosinophils, Absolute 0.22 0.00 - 0.40 10*3/mm3    Basophils, Absolute 0.05 0.00 - 0.20 10*3/mm3    Immature Grans, Absolute 0.02 0.00 - 0.05 10*3/mm3    nRBC 0.0 0.0 - 0.2 /100 WBC   Iron Profile    Specimen: Blood   Result Value Ref Range    Iron 91 37 - 145 mcg/dL    Iron Saturation 23  20 - 50 %    Transferrin 267 200 - 360 mg/dL    TIBC 398 298 - 536 mcg/dL   Vitamin D 25 Hydroxy    Specimen: Blood   Result Value Ref Range    25 Hydroxy, Vitamin D 48.4 30.0 - 100.0 ng/ml   TSH    Specimen: Blood   Result Value Ref Range    TSH 0.620 0.270 - 4.200 mIU/mL   Hemoglobin A1c    Specimen: Blood   Result Value Ref Range    Hemoglobin A1C 4.90 4.80 - 5.60 %   Ferritin    Specimen: Blood   Result Value Ref Range    Ferritin 47.00 13.00 - 150.00 ng/mL   Vitamin B12    Specimen: Blood   Result Value Ref Range    Vitamin B-12 550 211 - 946 pg/mL   Lipid Panel    Specimen: Blood   Result Value Ref Range    Total Cholesterol 165 0 - 200 mg/dL    Triglycerides 108 0 - 150 mg/dL    HDL Cholesterol 72 (H) 40 - 60 mg/dL    LDL Cholesterol  71 0 - 100 mg/dL    VLDL Cholesterol 21.6 5 - 40 mg/dL    LDL/HDL Ratio 0.99      *Note: Due to a large number of results and/or encounters for the requested time period, some results have not been displayed. A complete set of results can be found in Results Review.       Some portions of this note have been dictated using voice recognition software and may contain errors and/or omissions.

## 2021-06-14 NOTE — PATIENT INSTRUCTIONS
MyPlate from USDA    MyPlate is an outline of a general healthy diet based on the 2010 Dietary Guidelines for Americans, from the U.S. Department of Agriculture (USDA). It sets guidelines for how much food you should eat from each food group based on your age, sex, and level of physical activity.  What are tips for following MyPlate?  To follow MyPlate recommendations:  · Eat a wide variety of fruits and vegetables, grains, and protein foods.  · Serve smaller portions and eat less food throughout the day.  · Limit portion sizes to avoid overeating.  · Enjoy your food.  · Get at least 150 minutes of exercise every week. This is about 30 minutes each day, 5 or more days per week.  It can be difficult to have every meal look like MyPlate. Think about MyPlate as eating guidelines for an entire day, rather than each individual meal.  Fruits and vegetables  · Make half of your plate fruits and vegetables.  · Eat many different colors of fruits and vegetables each day.  · For a 2,000 calorie daily food plan, eat:  ? 2½ cups of vegetables every day.  ? 2 cups of fruit every day.  · 1 cup is equal to:  ? 1 cup raw or cooked vegetables.  ? 1 cup raw fruit.  ? 1 medium-sized orange, apple, or banana.  ? 1 cup 100% fruit or vegetable juice.  ? 2 cups raw leafy greens, such as lettuce, spinach, or kale.  ? ½ cup dried fruit.  Grains  · One fourth of your plate should be grains.  · Make at least half of the grains you eat each day whole grains.  · For a 2,000 calorie daily food plan, eat 6 oz of grains every day.  · 1 oz is equal to:  ? 1 slice bread.  ? 1 cup cereal.  ? ½ cup cooked rice, cereal, or pasta.  Protein  · One fourth of your plate should be protein.  · Eat a wide variety of protein foods, including meat, poultry, fish, eggs, beans, nuts, and tofu.  · For a 2,000 calorie daily food plan, eat 5½ oz of protein every day.  · 1 oz is equal to:  ? 1 oz meat, poultry, or fish.  ? ¼ cup cooked beans.  ? 1 egg.  ? ½ oz nuts  or seeds.  ? 1 Tbsp peanut butter.  Dairy  · Drink fat-free or low-fat (1%) milk.  · Eat or drink dairy as a side to meals.  · For a 2,000 calorie daily food plan, eat or drink 3 cups of dairy every day.  · 1 cup is equal to:  ? 1 cup milk, yogurt, cottage cheese, or soy milk (soy beverage).  ? 2 oz processed cheese.  ? 1½ oz natural cheese.  Fats, oils, salt, and sugars  · Only small amounts of oils are recommended.  · Avoid foods that are high in calories and low in nutritional value (empty calories), like foods high in fat or added sugars.  · Choose foods that are low in salt (sodium). Choose foods that have less than 140 milligrams (mg) of sodium per serving.  · Drink water instead of sugary drinks. Drink enough water each day to keep your urine pale yellow.  Where to find support  · Work with your health care provider or a nutrition specialist (dietitian) to develop a customized eating plan that is right for you.  · Download an annita (mobile application) to help you track your daily food intake.  Where to find more information  · Go to ChooseMyPlate.gov for more information.  Summary  · MyPlate is a general guideline for healthy eating from the USDA. It is based on the 2010 Dietary Guidelines for Americans.  · In general, fruits and vegetables should take up ½ of your plate, grains should take up ¼ of your plate, and protein should take up ¼ of your plate.  This information is not intended to replace advice given to you by your health care provider. Make sure you discuss any questions you have with your health care provider.  Document Revised: 05/21/2020 Document Reviewed: 03/19/2018  Elsevier Patient Education © 2021 [employer]    BMI for Adults  What is BMI?  Body mass index (BMI) is a number that is calculated from a person's weight and height. BMI can help estimate how much of a person's weight is composed of fat. BMI does not measure body fat directly. Rather, it is an alternative to procedures that  "directly measure body fat, which can be difficult and expensive.  BMI can help identify people who may be at higher risk for certain medical problems.  What are BMI measurements used for?  BMI is used as a screening tool to identify possible weight problems. It helps determine whether a person is obese, overweight, a healthy weight, or underweight.  BMI is useful for:  · Identifying a weight problem that may be related to a medical condition or may increase the risk for medical problems.  · Promoting changes, such as changes in diet and exercise, to help reach a healthy weight. BMI screening can be repeated to see if these changes are working.  How is BMI calculated?  BMI involves measuring your weight in relation to your height. Both height and weight are measured, and the BMI is calculated from those numbers. This can be done either in English (U[place] or metric measurements. Note that charts and online BMI calculators are available to help you find your BMI quickly and easily without having to do these calculations yourself.  To calculate your BMI in English ([place] measurements:    1. Measure your weight in pounds (lb).  2. Multiply the number of pounds by 703.  ? For example, for a person who weighs 180 lb, multiply that number by 703, which equals 126,540.  3. Measure your height in inches. Then multiply that number by itself to get a measurement called \"inches squared.\"  ? For example, for a person who is 70 inches tall, the \"inches squared\" measurement is 70 inches x 70 inches, which equals 4,900 inches squared.  4. Divide the total from step 2 (number of lb x 703) by the total from step 3 (inches squared): 126,540 ÷ 4,900 = 25.8. This is your BMI.  To calculate your BMI in metric measurements:  1. Measure your weight in kilograms (kg).  2. Measure your height in meters (m). Then multiply that number by itself to get a measurement called \"meters squared.\"  ? For example, for a person who is 1.75 m tall, the " "\"meters squared\" measurement is 1.75 m x 1.75 m, which is equal to 3.1 meters squared.  3. Divide the number of kilograms (your weight) by the meters squared number. In this example: 70 ÷ 3.1 = 22.6. This is your BMI.  What do the results mean?  BMI charts are used to identify whether you are underweight, normal weight, overweight, or obese. The following guidelines will be used:  · Underweight: BMI less than 18.5.  · Normal weight: BMI between 18.5 and 24.9.  · Overweight: BMI between 25 and 29.9.  · Obese: BMI of 30 or above.  Keep these notes in mind:  · Weight includes both fat and muscle, so someone with a muscular build, such as an athlete, may have a BMI that is higher than 24.9. In cases like these, BMI is not an accurate measure of body fat.  · To determine if excess body fat is the cause of a BMI of 25 or higher, further assessments may need to be done by a health care provider.  · BMI is usually interpreted in the same way for men and women.  Where to find more information  For more information about BMI, including tools to quickly calculate your BMI, go to these websites:  · Centers for Disease Control and Prevention: www.cdc.gov  · American Heart Association: www.heart.org  · National Heart, Lung, and Blood Dupo: www.nhlbi.nih.gov  Summary  · Body mass index (BMI) is a number that is calculated from a person's weight and height.  · BMI may help estimate how much of a person's weight is composed of fat. BMI can help identify those who may be at higher risk for certain medical problems.  · BMI can be measured using English measurements or metric measurements.  · BMI charts are used to identify whether you are underweight, normal weight, overweight, or obese.  This information is not intended to replace advice given to you by your health care provider. Make sure you discuss any questions you have with your health care provider.  Document Revised: 09/09/2020 Document Reviewed: 07/17/2020  Robin " Patient Education © 2021 Elsevier Inc.

## 2021-06-15 RX ORDER — RISPERIDONE 2 MG/1
2 TABLET ORAL
COMMUNITY
Start: 2021-05-25

## 2021-06-15 RX ORDER — SIMVASTATIN 10 MG
10 TABLET ORAL EVERY EVENING
COMMUNITY
Start: 2021-04-27

## 2021-06-15 RX ORDER — CITALOPRAM 10 MG/1
10 TABLET ORAL DAILY
COMMUNITY

## 2021-06-15 RX ORDER — ALBUTEROL SULFATE 90 UG/1
2 AEROSOL, METERED RESPIRATORY (INHALATION) EVERY 4 HOURS PRN
COMMUNITY

## 2021-06-15 RX ORDER — CYCLOBENZAPRINE HCL 10 MG
10 TABLET ORAL 3 TIMES DAILY PRN
COMMUNITY

## 2021-06-15 RX ORDER — HYDROXYZINE PAMOATE 50 MG/1
50 CAPSULE ORAL 3 TIMES DAILY PRN
COMMUNITY

## 2021-06-15 RX ORDER — BUPROPION HYDROCHLORIDE 300 MG/1
300 TABLET ORAL DAILY
COMMUNITY

## 2021-06-15 RX ORDER — TRAZODONE HYDROCHLORIDE 100 MG/1
100 TABLET ORAL NIGHTLY
COMMUNITY

## 2021-06-15 RX ORDER — LISINOPRIL 10 MG/1
10 TABLET ORAL DAILY
COMMUNITY

## 2021-08-17 ENCOUNTER — TELEPHONE (OUTPATIENT)
Dept: GASTROENTEROLOGY | Facility: CLINIC | Age: 56
End: 2021-08-17

## 2021-08-17 NOTE — TELEPHONE ENCOUNTER
----- Message from Rick Lincoln sent at 8/17/2021  3:51 PM CDT -----  Contact: [phone]  Thank you  ----- Message -----  From: Ana Paula Morrison MA  Sent: 8/17/2021   3:36 PM CDT  To: Shari Allen MA, Rick Lincoln, #    Left patient a voicemail   ----- Message -----  From: Rick Lincoln  Sent: 8/17/2021   2:23 PM CDT  To: Ana Paula Morrison MA, Shari Allen MA, #    Patient called to say that she has lost her instruction for her prep and needs to know what they are and when she is suppose to start taking it. Has procedure 08/18/21

## 2021-08-17 NOTE — TELEPHONE ENCOUNTER
A voicemail has been left for the patient to make her aware that I was returning her call. I explained in detail her instructions for her procedure tomorrow. I explained via voicemail that if she had not followed all of the instructions that she would need to reschedule her procedure and to call our office back.

## 2021-08-18 ENCOUNTER — ANESTHESIA (OUTPATIENT)
Dept: GASTROENTEROLOGY | Facility: HOSPITAL | Age: 56
End: 2021-08-18

## 2021-08-18 ENCOUNTER — HOSPITAL ENCOUNTER (OUTPATIENT)
Facility: HOSPITAL | Age: 56
Setting detail: HOSPITAL OUTPATIENT SURGERY
Discharge: HOME | End: 2021-08-18
Attending: INTERNAL MEDICINE | Admitting: INTERNAL MEDICINE

## 2021-08-18 ENCOUNTER — ANESTHESIA EVENT (OUTPATIENT)
Dept: GASTROENTEROLOGY | Facility: HOSPITAL | Age: 56
End: 2021-08-18

## 2021-08-18 VITALS
TEMPERATURE: 98 F | WEIGHT: 129.19 LBS | DIASTOLIC BLOOD PRESSURE: 78 MMHG | SYSTOLIC BLOOD PRESSURE: 144 MMHG | RESPIRATION RATE: 18 BRPM | HEIGHT: 62 IN | HEART RATE: 58 BPM | OXYGEN SATURATION: 99 % | BODY MASS INDEX: 23.77 KG/M2

## 2021-08-18 DIAGNOSIS — R19.7 DIARRHEA, UNSPECIFIED TYPE: ICD-10-CM

## 2021-08-18 DIAGNOSIS — R15.9 INCONTINENCE OF FECES, UNSPECIFIED FECAL INCONTINENCE TYPE: ICD-10-CM

## 2021-08-18 PROCEDURE — 45380 COLONOSCOPY AND BIOPSY: CPT | Performed by: INTERNAL MEDICINE

## 2021-08-18 PROCEDURE — 25010000002 PROPOFOL 10 MG/ML EMULSION: Performed by: NURSE ANESTHETIST, CERTIFIED REGISTERED

## 2021-08-18 RX ORDER — DEXTROSE AND SODIUM CHLORIDE 5; .45 G/100ML; G/100ML
30 INJECTION, SOLUTION INTRAVENOUS CONTINUOUS PRN
Status: DISCONTINUED | OUTPATIENT
Start: 2021-08-18 | End: 2021-08-18 | Stop reason: HOSPADM

## 2021-08-18 RX ORDER — LIDOCAINE HYDROCHLORIDE 20 MG/ML
INJECTION, SOLUTION INTRAVENOUS AS NEEDED
Status: DISCONTINUED | OUTPATIENT
Start: 2021-08-18 | End: 2021-08-18 | Stop reason: SURG

## 2021-08-18 RX ORDER — PROPOFOL 10 MG/ML
VIAL (ML) INTRAVENOUS AS NEEDED
Status: DISCONTINUED | OUTPATIENT
Start: 2021-08-18 | End: 2021-08-18 | Stop reason: SURG

## 2021-08-18 RX ADMIN — PROPOFOL 30 MG: 10 INJECTION, EMULSION INTRAVENOUS at 14:14

## 2021-08-18 RX ADMIN — LIDOCAINE HYDROCHLORIDE 50 MG: 20 INJECTION, SOLUTION INTRAVENOUS at 14:10

## 2021-08-18 RX ADMIN — PROPOFOL 100 MG: 10 INJECTION, EMULSION INTRAVENOUS at 14:10

## 2021-08-18 RX ADMIN — PROPOFOL 30 MG: 10 INJECTION, EMULSION INTRAVENOUS at 14:19

## 2021-08-18 RX ADMIN — DEXTROSE AND SODIUM CHLORIDE 30 ML/HR: 5; 450 INJECTION, SOLUTION INTRAVENOUS at 13:08

## 2021-08-18 RX ADMIN — PROPOFOL 30 MG: 10 INJECTION, EMULSION INTRAVENOUS at 14:12

## 2021-08-18 NOTE — H&P
No chief complaint on file.      ENDO PROCEDURE ORDERED: COLON diarrhea, incontinence    Subjective    Anh Koch is a 56 y.o. female. she is here today for follow-up.    History of Present Illness    Patient presents for follow-up on her hepatitis C, diarrhea. Last seen 09/22/2020. She was an F0 at that time. She currently denies abdominal pain, heartburn, nausea, vomiting, dysphagia. She is mainly concerned that she is having difficulty controlling her bowels. They are loose but she has not seen any blood or mucus in them. Weight is up 9 pounds since last visit. Last colonoscopy 02/24/2016.     After the patient left we were able to get recent laboratories from Dr. Boyd. On 04/21/2021 cholesterol 207, , CMP, vitamin D, urinalysis, TSH, A1c, CBC were all fairly normal or negative.     A/P: Patient with increasing diarrhea, abdominal discomfort. It has been more than 5 years since her last colonoscopy. I did recommend colonoscopy, will do biopsies and stool studies. She may need further medications to treat. Will plan follow-up after the above, further pending clinical course and the results of the above.        The following portions of the patient's history were reviewed and updated as appropriate:   Past Medical History:   Diagnosis Date   • Abnormal liver function    • Anxiety    • Attention deficit hyperactivity disorder (ADHD), combined type 10/23/2017   • Basal cell carcinoma    • Bipolar disorder (CMS/HCC)    • Chronic hepatitis C (CMS/HCC)     1a. HCV Fibrosure .04/F0, necroinflam .12/A0   • Elevated levels of transaminase & lactic acid dehydrogenase    • Hallux valgus with bunions    • Hepatitis C    • Kidney stone    • Low back pain    • Menopausal and female climacteric states 9/11/2017   • Recurrent major depressive disorder, in partial remission (CMS/HCC) 9/11/2017   • Smoker 9/11/2017     Past Surgical History:   Procedure Laterality Date   • BUNIONECTOMY Right    • COLONOSCOPY   2016    Normal colon.No specimens.   • CYSTOSCOPY W/ URETEROSCOPY W/ LITHOTRIPSY     • SUBTOTAL HYSTERECTOMY     • TUBAL ABDOMINAL LIGATION       Family History   Problem Relation Age of Onset   • Hypertension Mother    • Diabetes Mother    • Heart attack Father    • Sudden death Father    • Heart disease Father      OB History        5    Para   2    Term                AB        Living           SAB        TAB        Ectopic        Molar        Multiple        Live Births                  No Known Allergies  Social History     Socioeconomic History   • Marital status: Single     Spouse name: Not on file   • Number of children: Not on file   • Years of education: Not on file   • Highest education level: Not on file   Tobacco Use   • Smoking status: Current Every Day Smoker     Packs/day: 0.50     Years: 40.00     Pack years: 20.00     Types: Cigarettes     Start date:    • Smokeless tobacco: Never Used   Vaping Use   • Vaping Use: Never used   Substance and Sexual Activity   • Alcohol use: Not Currently     Alcohol/week: 3.0 standard drinks     Types: 1 Glasses of wine, 1 Cans of beer, 1 Shots of liquor per week     Comment: Ocassionally   • Drug use: No   • Sexual activity: Not Currently     Current Medications:  Prior to Admission medications    Medication Sig Start Date End Date Taking? Authorizing Provider   amphetamine-dextroamphetamine (ADDERALL) 30 MG tablet Take 1 tablet by mouth 2 (Two) Times a Day. 3/27/19   Bharat Sagastume MD   buPROPion SR (Wellbutrin SR) 150 MG 12 hr tablet Take 1 tablet by mouth 2 (Two) Times a Day. 20   Abdulaziz Curtis PA-C   cloNIDine (CATAPRES) 0.1 MG tablet Take 1 tablet by mouth Every Night. 20   Abdulaziz Curtis PA-C   hydroCHLOROthiazide (HYDRODIURIL) 25 MG tablet Take 1 tablet by mouth Daily. 20   Abdulaziz Curtis PA-C   loratadine (Claritin) 10 MG tablet Take 1 tablet by mouth Daily. 20   Abdulaziz Curtis PA-C   naproxen  "(Naprosyn) 500 MG tablet Take 1 tablet by mouth 2 (Two) Times a Day With Meals. 9/22/20   Abdulaziz Curtis PA-C   sertraline (ZOLOFT) 50 MG tablet Take 1 tablet by mouth Daily. 9/22/20   Abdulaziz Curtis PA-C   traZODone (DESYREL) 50 MG tablet Take 1 tablet by mouth Every Night. 9/22/20   Abdulaziz Curtis PA-C     Review of Systems  Review of Systems   HENT: Negative for trouble swallowing.    Gastrointestinal: Positive for abdominal pain and diarrhea. Negative for anal bleeding, blood in stool, constipation, nausea, rectal pain and vomiting.          Objective    /74   Pulse 62   Temp 97.8 °F (36.6 °C)   Resp 18   Ht 157.5 cm (62.01\")   Wt 58.6 kg (129 lb 3 oz)   LMP  (LMP Unknown)   SpO2 99%   BMI 23.62 kg/m²   Physical Exam  Vitals and nursing note reviewed.   Constitutional:       General: She is not in acute distress.     Appearance: She is well-developed.   HENT:      Head: Normocephalic and atraumatic.   Eyes:      Pupils: Pupils are equal, round, and reactive to light.   Cardiovascular:      Rate and Rhythm: Normal rate and regular rhythm.      Heart sounds: Normal heart sounds.   Pulmonary:      Effort: Pulmonary effort is normal.      Breath sounds: Normal breath sounds.   Abdominal:      General: Bowel sounds are normal. There is no distension or abdominal bruit.      Palpations: Abdomen is soft. Abdomen is not rigid. There is no shifting dullness or mass.      Tenderness: There is abdominal tenderness. There is no guarding or rebound.      Hernia: No hernia is present. There is no hernia in the ventral area.      Comments: mild   Musculoskeletal:         General: Normal range of motion.      Cervical back: Normal range of motion.   Skin:     General: Skin is warm and dry.   Neurological:      Mental Status: She is alert and oriented to person, place, and time.   Psychiatric:         Behavior: Behavior normal.         Thought Content: Thought content normal.         Judgment: Judgment " normal.       Assessment/Plan      1. Diarrhea, unspecified type    2. Incontinence of feces, unspecified fecal incontinence type    .   Diagnoses and all orders for this visit:    1. Diarrhea, unspecified type  -     dextrose 5 % and sodium chloride 0.45 % infusion    2. Incontinence of feces, unspecified fecal incontinence type  -     dextrose 5 % and sodium chloride 0.45 % infusion    Other orders  -     Insert Peripheral IV; Standing  -     Insert Peripheral IV  -     POC Glucose Once; Standing  -     Obtain Informed Consent; Standing  -     POC Glucose Once  -     Obtain Informed Consent        Orders placed during this encounter include:  Orders Placed This Encounter   Procedures   • Obtain Informed Consent     Standing Status:   Standing     Number of Occurrences:   1     Order Specific Question:   Informed Consent Given For     Answer:   COLONOSCOPY   • POC Glucose Once     Prior to Procedure on ALL Diabetic Patients     Standing Status:   Standing     Number of Occurrences:   1     Order Specific Question:   Release to patient     Answer:   Immediate   • Insert Peripheral IV     Standing Status:   Standing     Number of Occurrences:   1       Medications prescribed:  New Medications Ordered This Visit   Medications   • dextrose 5 % and sodium chloride 0.45 % infusion       Requested Prescriptions      No prescriptions requested or ordered in this encounter       Review and/or summary of lab tests, radiology, procedures, medications. Review and summary of old records and obtaining of history. The risks and benefits of my recommendations, as well as other treatment options were discussed with the patient today. Questions were answered.    Follow-up: No follow-ups on file.     COLONOSCOPY (N/A)      This document has been electronically signed by Bharat Nelson MD on August 18, 2021 13:20 CDT      Results for orders placed or performed in visit on 01/28/20   HCV RT-PCR,Quant(Non-Graph)    Specimen: Blood    Result Value Ref Range    Hepatitis C Quantitation HCV Not Detected IU/mL    Test Information Comment    HCV FibroSURE    Specimen: Blood   Result Value Ref Range    Fibrosis Score 0.04 0.00 - 0.21    Fibrosis Stage Comment     Necroinflammat Activity Score 0.03 0.00 - 0.17    Necroinflammat Activity Grade A0-No activity     Alpha 2-Macroglobulins, Qn 173 110 - 276 mg/dL    Haptoglobin 114 33 - 346 mg/dL    Apolipoprotein A-1 200 116 - 209 mg/dL    Total Bilirubin 0.3 0.0 - 1.2 mg/dL    GGT 11 0 - 60 IU/L    ALT (SGPT) 14 0 - 40 IU/L    HCV Qual Interp Comment     Fibrosis Scoring: Comment     Necroinflamm Activity Scoring: Comment     Limitations: Comment     Comment Comment    CBC Auto Differential    Specimen: Blood   Result Value Ref Range    WBC 9.05 3.40 - 10.80 10*3/mm3    RBC 4.09 3.77 - 5.28 10*6/mm3    Hemoglobin 13.7 12.0 - 15.9 g/dL    Hematocrit 39.3 34.0 - 46.6 %    MCV 96.1 79.0 - 97.0 fL    MCH 33.5 (H) 26.6 - 33.0 pg    MCHC 34.9 31.5 - 35.7 g/dL    RDW 12.2 (L) 12.3 - 15.4 %    RDW-SD 42.8 37.0 - 54.0 fl    MPV 11.4 6.0 - 12.0 fL    Platelets 244 140 - 450 10*3/mm3    Neutrophil % 74.4 42.7 - 76.0 %    Lymphocyte % 18.8 (L) 19.6 - 45.3 %    Monocyte % 3.9 (L) 5.0 - 12.0 %    Eosinophil % 2.3 0.3 - 6.2 %    Basophil % 0.4 0.0 - 1.5 %    Immature Grans % 0.2 0.0 - 0.5 %    Neutrophils, Absolute 6.73 1.70 - 7.00 10*3/mm3    Lymphocytes, Absolute 1.70 0.70 - 3.10 10*3/mm3    Monocytes, Absolute 0.35 0.10 - 0.90 10*3/mm3    Eosinophils, Absolute 0.21 0.00 - 0.40 10*3/mm3    Basophils, Absolute 0.04 0.00 - 0.20 10*3/mm3    Immature Grans, Absolute 0.02 0.00 - 0.05 10*3/mm3    nRBC 0.0 0.0 - 0.2 /100 WBC   AFP Tumor Marker    Specimen: Blood   Result Value Ref Range    ALPHA-FETOPROTEIN 1.26 0 - 8.3 ng/mL   Protime-INR    Specimen: Blood   Result Value Ref Range    Protime 11.6 11.1 - 15.3 Seconds    INR 0.87 0.80 - 1.20   Comprehensive Metabolic Panel    Specimen: Blood   Result Value Ref Range     Glucose 96 65 - 99 mg/dL    BUN 9 6 - 20 mg/dL    Creatinine 0.60 0.57 - 1.00 mg/dL    Sodium 139 136 - 145 mmol/L    Potassium 4.3 3.5 - 5.2 mmol/L    Chloride 101 98 - 107 mmol/L    CO2 25.2 22.0 - 29.0 mmol/L    Calcium 9.1 8.6 - 10.5 mg/dL    Total Protein 6.5 6.0 - 8.5 g/dL    Albumin 4.10 3.50 - 5.20 g/dL    ALT (SGPT) 14 1 - 33 U/L    AST (SGOT) 13 1 - 32 U/L    Alkaline Phosphatase 77 39 - 117 U/L    Total Bilirubin 0.3 0.2 - 1.2 mg/dL    eGFR Non African Amer 104 >60 mL/min/1.73    Globulin 2.4 gm/dL    A/G Ratio 1.7 g/dL    BUN/Creatinine Ratio 15.0 7.0 - 25.0    Anion Gap 12.8 5.0 - 15.0 mmol/L   Results for orders placed or performed in visit on 07/02/19   Hepatitis C RNA, Quantitative, PCR (graph)    Specimen: Blood   Result Value Ref Range    Hepatitis C Quantitation HCV Not Detected IU/mL    Test Information Comment    Results for orders placed or performed in visit on 03/27/19   CBC Auto Differential    Specimen: Blood   Result Value Ref Range    WBC 6.05 3.40 - 10.80 10*3/mm3    RBC 3.99 3.77 - 5.28 10*6/mm3    Hemoglobin 13.0 12.0 - 15.9 g/dL    Hematocrit 38.6 34.0 - 46.6 %    MCV 96.7 79.0 - 97.0 fL    MCH 32.6 26.6 - 33.0 pg    MCHC 33.7 31.5 - 35.7 g/dL    RDW 12.1 (L) 12.3 - 15.4 %    RDW-SD 43.4 37.0 - 54.0 fl    MPV 12.1 (H) 6.0 - 12.0 fL    Platelets 211 140 - 450 10*3/mm3    Neutrophil % 59.1 42.7 - 76.0 %    Lymphocyte % 31.4 19.6 - 45.3 %    Monocyte % 4.8 (L) 5.0 - 12.0 %    Eosinophil % 3.6 0.3 - 6.2 %    Basophil % 0.8 0.0 - 1.5 %    Immature Grans % 0.3 0.0 - 0.5 %    Neutrophils, Absolute 3.57 1.70 - 7.00 10*3/mm3    Lymphocytes, Absolute 1.90 0.70 - 3.10 10*3/mm3    Monocytes, Absolute 0.29 0.10 - 0.90 10*3/mm3    Eosinophils, Absolute 0.22 0.00 - 0.40 10*3/mm3    Basophils, Absolute 0.05 0.00 - 0.20 10*3/mm3    Immature Grans, Absolute 0.02 0.00 - 0.05 10*3/mm3    nRBC 0.0 0.0 - 0.2 /100 WBC   Iron Profile    Specimen: Blood   Result Value Ref Range    Iron 91 37 - 145 mcg/dL     Iron Saturation 23 20 - 50 %    Transferrin 267 200 - 360 mg/dL    TIBC 398 298 - 536 mcg/dL   Vitamin D 25 Hydroxy    Specimen: Blood   Result Value Ref Range    25 Hydroxy, Vitamin D 48.4 30.0 - 100.0 ng/ml   TSH    Specimen: Blood   Result Value Ref Range    TSH 0.620 0.270 - 4.200 mIU/mL   Hemoglobin A1c    Specimen: Blood   Result Value Ref Range    Hemoglobin A1C 4.90 4.80 - 5.60 %   Ferritin    Specimen: Blood   Result Value Ref Range    Ferritin 47.00 13.00 - 150.00 ng/mL   Vitamin B12    Specimen: Blood   Result Value Ref Range    Vitamin B-12 550 211 - 946 pg/mL   Lipid Panel    Specimen: Blood   Result Value Ref Range    Total Cholesterol 165 0 - 200 mg/dL    Triglycerides 108 0 - 150 mg/dL    HDL Cholesterol 72 (H) 40 - 60 mg/dL    LDL Cholesterol  71 0 - 100 mg/dL    VLDL Cholesterol 21.6 5 - 40 mg/dL    LDL/HDL Ratio 0.99      *Note: Due to a large number of results and/or encounters for the requested time period, some results have not been displayed. A complete set of results can be found in Results Review.       Some portions of this note have been dictated using voice recognition software and may contain errors and/or omissions.

## 2021-08-18 NOTE — ANESTHESIA POSTPROCEDURE EVALUATION
Patient: Anh Kohc    Procedure Summary     Date: 08/18/21 Room / Location: [facility] ENDOSCOPY 1 / [facility] ENDOSCOPY    Anesthesia Start: 1400 Anesthesia Stop: 1423    Procedure: COLONOSCOPY (N/A ) Diagnosis:       Diarrhea, unspecified type      Incontinence of feces, unspecified fecal incontinence type      (Diarrhea, unspecified type [R19.7])      (Incontinence of feces, unspecified fecal incontinence type [R15.9])    Surgeons: Bhraat Nelson MD Provider: Osvaldo Lin CRNA    Anesthesia Type: MAC ASA Status: 3          Anesthesia Type: MAC    Vitals  No vitals data found for the desired time range.          Post Anesthesia Care and Evaluation    Patient location during evaluation: bedside  Patient participation: complete - patient participated  Level of consciousness: awake and alert  Pain score: 0  Pain management: adequate  Airway patency: patent  Anesthetic complications: No anesthetic complications  PONV Status: none  Cardiovascular status: acceptable  Respiratory status: acceptable  Hydration status: acceptable    Comments: ---------------------------               08/18/21                      1423         ---------------------------   BP:          146/74         Pulse:         62           Resp:          18           Temp:   97.8 °F (36.6 °C)   SpO2:          99%         ---------------------------

## 2021-08-18 NOTE — ANESTHESIA PREPROCEDURE EVALUATION
Anesthesia Evaluation     Patient summary reviewed and Nursing notes reviewed   NPO Solid Status: > 8 hours  NPO Liquid Status: > 4 hours           Airway   Mallampati: II  No difficulty expected  Dental      Pulmonary - normal exam   (+) a smoker Current Smoked day of surgery,   Cardiovascular - negative cardio ROS    Rhythm: regular  Rate: normal        Neuro/Psych  GI/Hepatic/Renal/Endo      Musculoskeletal (-) negative ROS    Abdominal    Substance History      OB/GYN          Other - negative ROS                       Anesthesia Plan    ASA 3     MAC     intravenous induction     Anesthetic plan, all risks, benefits, and alternatives have been provided, discussed and informed consent has been obtained with: patient.    Plan discussed with CRNA.

## 2021-08-23 LAB
LAB AP CASE REPORT: NORMAL
PATH REPORT.FINAL DX SPEC: NORMAL

## 2021-08-25 ENCOUNTER — TELEPHONE (OUTPATIENT)
Dept: GASTROENTEROLOGY | Facility: CLINIC | Age: 56
End: 2021-08-25

## 2021-08-25 NOTE — TELEPHONE ENCOUNTER
Called patient regarding missed appt today with Abdulaziz Curtis PA-C- 08/25/2021.    Left voicemail for patient letting her know she had missed her appt.    If patient chooses to reschedule, she can reach office at [phone].

## (undated) DEVICE — SINGLE-USE BIOPSY FORCEPS: Brand: RADIAL JAW 4

## (undated) DEVICE — CANN SMPL SOFTECH BIFLO ETCO2 A/M 7FT